# Patient Record
Sex: FEMALE | Race: WHITE | Employment: UNEMPLOYED | URBAN - METROPOLITAN AREA
[De-identification: names, ages, dates, MRNs, and addresses within clinical notes are randomized per-mention and may not be internally consistent; named-entity substitution may affect disease eponyms.]

---

## 2017-01-25 ENCOUNTER — TELEPHONE (OUTPATIENT)
Dept: FAMILY MEDICINE CLINIC | Facility: CLINIC | Age: 27
End: 2017-01-25

## 2017-02-07 ENCOUNTER — INITIAL PRENATAL (OUTPATIENT)
Dept: OBGYN CLINIC | Age: 27
End: 2017-02-07

## 2017-02-07 VITALS
HEART RATE: 67 BPM | WEIGHT: 216.8 LBS | SYSTOLIC BLOOD PRESSURE: 132 MMHG | BODY MASS INDEX: 40.96 KG/M2 | DIASTOLIC BLOOD PRESSURE: 87 MMHG

## 2017-02-07 DIAGNOSIS — Z86.32 HISTORY OF GESTATIONAL DIABETES MELLITUS (GDM) IN PRIOR PREGNANCY, CURRENTLY PREGNANT IN SECOND TRIMESTER: ICD-10-CM

## 2017-02-07 DIAGNOSIS — O99.210 OBESITY IN PREGNANCY: ICD-10-CM

## 2017-02-07 DIAGNOSIS — O09.292 HISTORY OF GESTATIONAL DIABETES MELLITUS (GDM) IN PRIOR PREGNANCY, CURRENTLY PREGNANT IN SECOND TRIMESTER: ICD-10-CM

## 2017-02-07 DIAGNOSIS — Z34.82 PRENATAL CARE, SUBSEQUENT PREGNANCY, SECOND TRIMESTER: Primary | ICD-10-CM

## 2017-02-07 PROCEDURE — 99215 OFFICE O/P EST HI 40 MIN: CPT | Performed by: OBSTETRICS & GYNECOLOGY

## 2017-02-07 PROCEDURE — 90686 IIV4 VACC NO PRSV 0.5 ML IM: CPT | Performed by: OBSTETRICS & GYNECOLOGY

## 2017-02-07 PROCEDURE — 90471 IMMUNIZATION ADMIN: CPT | Performed by: OBSTETRICS & GYNECOLOGY

## 2017-02-08 PROBLEM — O26.892 RH NEGATIVE STATUS DURING PREGNANCY IN SECOND TRIMESTER, ANTEPARTUM: Status: RESOLVED | Noted: 2017-02-08 | Resolved: 2017-02-08

## 2017-02-08 PROBLEM — O26.892 RH NEGATIVE STATUS DURING PREGNANCY IN SECOND TRIMESTER, ANTEPARTUM: Status: ACTIVE | Noted: 2017-02-08

## 2017-02-08 PROBLEM — Z67.91 RH NEGATIVE STATUS DURING PREGNANCY IN SECOND TRIMESTER, ANTEPARTUM: Status: ACTIVE | Noted: 2017-02-08

## 2017-02-08 PROBLEM — Z67.91 RH NEGATIVE STATUS DURING PREGNANCY IN SECOND TRIMESTER, ANTEPARTUM: Status: RESOLVED | Noted: 2017-02-08 | Resolved: 2017-02-08

## 2017-02-08 LAB
CHLAMYDIA TRACHOMATIS AMPLIFIED DET: NORMAL
N GONORRHOEAE AMPLIFIED DET: NORMAL

## 2017-02-13 ENCOUNTER — TELEPHONE (OUTPATIENT)
Dept: OBGYN CLINIC | Age: 27
End: 2017-02-13

## 2017-03-01 ENCOUNTER — HOSPITAL ENCOUNTER (OUTPATIENT)
Dept: OTHER | Age: 27
Discharge: HOME | End: 2017-03-31

## 2017-03-01 ENCOUNTER — ROUTINE PRENATAL (OUTPATIENT)
Dept: PERINATAL CARE | Age: 27
End: 2017-03-01

## 2017-03-01 VITALS
WEIGHT: 218.8 LBS | SYSTOLIC BLOOD PRESSURE: 112 MMHG | HEART RATE: 70 BPM | BODY MASS INDEX: 41.34 KG/M2 | DIASTOLIC BLOOD PRESSURE: 78 MMHG

## 2017-03-01 DIAGNOSIS — Z36.3 SCREENING, ANTENATAL, FOR MALFORMATION BY ULTRASOUND: ICD-10-CM

## 2017-03-01 PROCEDURE — 99999 PR OFFICE/OUTPT VISIT,PROCEDURE ONLY: CPT | Performed by: OBSTETRICS & GYNECOLOGY

## 2017-03-07 ENCOUNTER — ROUTINE PRENATAL (OUTPATIENT)
Dept: OBGYN CLINIC | Age: 27
End: 2017-03-07

## 2017-03-07 VITALS
DIASTOLIC BLOOD PRESSURE: 84 MMHG | BODY MASS INDEX: 41.57 KG/M2 | HEART RATE: 86 BPM | WEIGHT: 220 LBS | SYSTOLIC BLOOD PRESSURE: 123 MMHG

## 2017-03-07 DIAGNOSIS — Z34.82 PRENATAL CARE, SUBSEQUENT PREGNANCY, SECOND TRIMESTER: Primary | ICD-10-CM

## 2017-03-07 PROCEDURE — 99213 OFFICE O/P EST LOW 20 MIN: CPT | Performed by: OBSTETRICS & GYNECOLOGY

## 2017-03-23 ENCOUNTER — HOSPITAL ENCOUNTER (OUTPATIENT)
Dept: ULTRASOUND IMAGING | Age: 27
Discharge: HOME | End: 2017-03-23
Attending: OBSTETRICS & GYNECOLOGY | Admitting: OBSTETRICS & GYNECOLOGY

## 2017-03-23 DIAGNOSIS — Z34.82 PRENATAL CARE, SUBSEQUENT PREGNANCY, SECOND TRIMESTER: ICD-10-CM

## 2017-03-23 DIAGNOSIS — Z34.82 ENCOUNTER FOR SUPERVISION OF OTHER NORMAL PREGNANCY, SECOND TRIMESTER: ICD-10-CM

## 2017-04-01 ENCOUNTER — HOSPITAL ENCOUNTER (OUTPATIENT)
Dept: OTHER | Age: 27
Discharge: HOME | End: 2017-04-30

## 2017-04-04 ENCOUNTER — ROUTINE PRENATAL (OUTPATIENT)
Dept: OBGYN CLINIC | Age: 27
End: 2017-04-04

## 2017-04-04 VITALS
HEART RATE: 96 BPM | DIASTOLIC BLOOD PRESSURE: 70 MMHG | WEIGHT: 222 LBS | BODY MASS INDEX: 41.95 KG/M2 | SYSTOLIC BLOOD PRESSURE: 114 MMHG

## 2017-04-04 DIAGNOSIS — Z34.82 PRENATAL CARE, SUBSEQUENT PREGNANCY, SECOND TRIMESTER: Primary | ICD-10-CM

## 2017-04-04 PROCEDURE — 99212 OFFICE O/P EST SF 10 MIN: CPT

## 2017-05-02 ENCOUNTER — ROUTINE PRENATAL (OUTPATIENT)
Dept: OBGYN CLINIC | Age: 27
End: 2017-05-02

## 2017-05-02 VITALS
WEIGHT: 225 LBS | SYSTOLIC BLOOD PRESSURE: 125 MMHG | DIASTOLIC BLOOD PRESSURE: 75 MMHG | HEART RATE: 93 BPM | BODY MASS INDEX: 42.51 KG/M2

## 2017-05-02 DIAGNOSIS — Z34.82 PRENATAL CARE, SUBSEQUENT PREGNANCY, SECOND TRIMESTER: Primary | ICD-10-CM

## 2017-05-02 PROCEDURE — 99212 OFFICE O/P EST SF 10 MIN: CPT | Performed by: OBSTETRICS & GYNECOLOGY

## 2017-05-30 ENCOUNTER — ROUTINE PRENATAL (OUTPATIENT)
Dept: OBGYN CLINIC | Age: 27
End: 2017-05-30

## 2017-05-30 VITALS
SYSTOLIC BLOOD PRESSURE: 148 MMHG | WEIGHT: 225 LBS | DIASTOLIC BLOOD PRESSURE: 78 MMHG | BODY MASS INDEX: 42.51 KG/M2 | HEART RATE: 96 BPM

## 2017-05-30 DIAGNOSIS — Z34.82 PRENATAL CARE, SUBSEQUENT PREGNANCY, SECOND TRIMESTER: Primary | ICD-10-CM

## 2017-05-30 PROCEDURE — 99212 OFFICE O/P EST SF 10 MIN: CPT

## 2017-06-13 ENCOUNTER — ROUTINE PRENATAL (OUTPATIENT)
Dept: OBGYN CLINIC | Age: 27
End: 2017-06-13

## 2017-06-13 VITALS
HEART RATE: 86 BPM | WEIGHT: 224 LBS | SYSTOLIC BLOOD PRESSURE: 137 MMHG | BODY MASS INDEX: 42.32 KG/M2 | DIASTOLIC BLOOD PRESSURE: 68 MMHG

## 2017-06-13 DIAGNOSIS — Z34.83 PRENATAL CARE, SUBSEQUENT PREGNANCY, THIRD TRIMESTER: Primary | ICD-10-CM

## 2017-06-13 DIAGNOSIS — O09.A0 H/O MOLAR PREGNANCY, ANTEPARTUM: ICD-10-CM

## 2017-06-13 PROCEDURE — 99212 OFFICE O/P EST SF 10 MIN: CPT | Performed by: OBSTETRICS & GYNECOLOGY

## 2017-06-27 ENCOUNTER — ROUTINE PRENATAL (OUTPATIENT)
Dept: OBGYN CLINIC | Age: 27
End: 2017-06-27

## 2017-06-27 VITALS
SYSTOLIC BLOOD PRESSURE: 147 MMHG | WEIGHT: 226 LBS | DIASTOLIC BLOOD PRESSURE: 89 MMHG | BODY MASS INDEX: 42.7 KG/M2 | HEART RATE: 60 BPM

## 2017-06-27 DIAGNOSIS — Z34.83 PRENATAL CARE, SUBSEQUENT PREGNANCY, THIRD TRIMESTER: Primary | ICD-10-CM

## 2017-06-27 PROCEDURE — 99212 OFFICE O/P EST SF 10 MIN: CPT | Performed by: OBSTETRICS & GYNECOLOGY

## 2017-06-28 PROBLEM — O36.4XX0 FETAL DEMISE, GREATER THAN 22 WEEKS, ANTEPARTUM, SINGLE GESTATION: Status: ACTIVE | Noted: 2017-06-28

## 2018-02-15 ENCOUNTER — INITIAL PRENATAL (OUTPATIENT)
Dept: OBGYN CLINIC | Age: 28
End: 2018-02-15

## 2018-02-15 ENCOUNTER — HOSPITAL ENCOUNTER (OUTPATIENT)
Dept: ULTRASOUND IMAGING | Age: 28
Discharge: HOME | End: 2018-02-28

## 2018-02-15 ENCOUNTER — HOSPITAL ENCOUNTER (OUTPATIENT)
Dept: OTHER | Age: 28
Discharge: HOME | End: 2018-02-15
Attending: OBSTETRICS & GYNECOLOGY | Admitting: OBSTETRICS & GYNECOLOGY

## 2018-02-15 VITALS
HEART RATE: 80 BPM | WEIGHT: 227 LBS | BODY MASS INDEX: 37.77 KG/M2 | DIASTOLIC BLOOD PRESSURE: 69 MMHG | SYSTOLIC BLOOD PRESSURE: 127 MMHG

## 2018-02-15 DIAGNOSIS — O36.5920 MATERNAL CARE FOR OTHER KNOWN OR SUSPECTED POOR FETAL GROWTH, SECOND TRIMESTER, NOT APPLICABLE OR UNSPECIFIED: ICD-10-CM

## 2018-02-15 DIAGNOSIS — O36.5920 POOR FETAL GROWTH AFFECTING MANAGEMENT OF MOTHER IN SECOND TRIMESTER, SINGLE OR UNSPECIFIED FETUS: ICD-10-CM

## 2018-02-15 DIAGNOSIS — Z34.82 PRENATAL CARE, SUBSEQUENT PREGNANCY, SECOND TRIMESTER: Primary | ICD-10-CM

## 2018-02-15 DIAGNOSIS — Z01.89 ENCOUNTER FOR IMAGING TO ASSESS MYOCARDIAL VIABILITY: ICD-10-CM

## 2018-02-15 LAB
ABO/RH: NORMAL
ANTIBODY SCREEN: NORMAL
HEPATITIS C ANTIBODY INTERPRETATION: NORMAL

## 2018-02-15 PROCEDURE — 99214 OFFICE O/P EST MOD 30 MIN: CPT | Performed by: OBSTETRICS & GYNECOLOGY

## 2018-02-15 RX ORDER — VITAMIN A, VITAMIN C, VITAMIN D-3, VITAMIN E, VITAMIN B-1, VITAMIN B-2, NIACIN, VITAMIN B-6, CALCIUM, IRON, ZINC, COPPER 4000; 120; 400; 22; 1.84; 3; 20; 10; 1; 12; 200; 27; 25; 2 [IU]/1; MG/1; [IU]/1; MG/1; MG/1; MG/1; MG/1; MG/1; MG/1; UG/1; MG/1; MG/1; MG/1; MG/1
1 TABLET ORAL DAILY
Qty: 30 TABLET | Refills: 11 | Status: SHIPPED | OUTPATIENT
Start: 2018-02-15 | End: 2018-02-15 | Stop reason: CLARIF

## 2018-02-15 RX ORDER — VITAMIN A, VITAMIN C, VITAMIN D-3, VITAMIN E, VITAMIN B-1, VITAMIN B-2, NIACIN, VITAMIN B-6, CALCIUM, IRON, ZINC, COPPER 4000; 120; 400; 22; 1.84; 3; 20; 10; 1; 12; 200; 27; 25; 2 [IU]/1; MG/1; [IU]/1; MG/1; MG/1; MG/1; MG/1; MG/1; MG/1; UG/1; MG/1; MG/1; MG/1; MG/1
1 TABLET ORAL DAILY
Qty: 30 TABLET | Refills: 11 | Status: SHIPPED | OUTPATIENT
Start: 2018-02-15 | End: 2018-02-21 | Stop reason: ALTCHOICE

## 2018-02-15 NOTE — PROGRESS NOTES
[facility] Obstetric Social History     Patient Name [facility] [birth year] Prenatal Care Began  2-15-18     Phone [phone] (home)  Kalli Ivan     Emergency Contact: Chinedu Luna    Phone [phone]     Marital Status: Single x                 Living Situation: lives with her mom, brother, boyfriend and two children     How many children do you have? 2     Name  Mark Green Age 8  Name  Young Wyocena Age 5  Name   Age    Name   Age    Name   Age       In whose custody? patients     Children Protective Services Involvement: Current   Prior   Dates    Are you on Probation? denies     Attitude about pregnancy: concerned because of stillborn with last pregnancy     Preparation for Infant arrival:  Patient plans to purchase items     Childbirth Classes:     Parenting Classes:       Any Domestic Abuse: denies  Physical Abuse: denies  Sexual Abuse: denies  Substance Abuse: denies  History of Depression: depression during time when she lost her son (stillbirth)  Other Mental Health Issues: denies     Education: 11th grade Occupation: unemployed  Was supported by her boyfriend and plans to return to cash assistance      [facility] [place] needs info Medicaid x [employer] x [employer] will apply  Support System: mom, boyfriend     Father of Baby: Jennifer Morgan  Age: 29 Education: 11th  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 2  Attitude toward Pregnancy? excited  Relationship with Father of Baby:  Been together in a relationship almost almost 5 years     Patient concerns/plans for self and infant: concern for healthy baby related to still birth, plan to work with infant     Summary: Patient lives with her lives with her mom, brother, boyfriend and two children. Patient denies abuse or depression. Patient states she receives support from her boyfriend and mom. Patient scored a 9 on depression scale and is not showing signs of depression.   Psychological Screening patient around second hand smoke from boyfriend advised patient to stay away from it.      Referrals Offered:  [address], [facility] [facility]  Follow-up needed:       Benji HERNANDEZW, LSW       Follow-up:

## 2018-02-15 NOTE — PROGRESS NOTES
New OB visit. No complaints. PE done. VS reviewed. Plan early glucola. 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 Plan start AP testing 32 wks.  Sono for absent [employer]

## 2018-02-16 LAB
BASOPHILS ABSOLUTE: 0 K/UL (ref 0–0.2)
BASOPHILS RELATIVE PERCENT: 0.5 %
EOSINOPHILS ABSOLUTE: 0.2 K/UL (ref 0–0.6)
EOSINOPHILS RELATIVE PERCENT: 1.6 %
HCT VFR BLD CALC: 37.6 % (ref 36–48)
HEMOGLOBIN: 13 G/DL (ref 12–16)
HEPATITIS B SURFACE ANTIGEN INTERPRETATION: NORMAL
HIV AG/AB: NORMAL
HIV ANTIGEN: NORMAL
HIV-1 ANTIBODY: NORMAL
HIV-2 AB: NORMAL
LYMPHOCYTES ABSOLUTE: 2.3 K/UL (ref 1–5.1)
LYMPHOCYTES RELATIVE PERCENT: 23.6 %
MCH RBC QN AUTO: 30.7 PG (ref 26–34)
MCHC RBC AUTO-ENTMCNC: 34.6 G/DL (ref 31–36)
MCV RBC AUTO: 88.6 FL (ref 80–100)
MONOCYTES ABSOLUTE: 1 K/UL (ref 0–1.3)
MONOCYTES RELATIVE PERCENT: 9.7 %
NEUTROPHILS ABSOLUTE: 6.4 K/UL (ref 1.7–7.7)
NEUTROPHILS RELATIVE PERCENT: 64.6 %
PDW BLD-RTO: 13.3 % (ref 12.4–15.4)
PLATELET # BLD: 270 K/UL (ref 135–450)
PMV BLD AUTO: 9 FL (ref 5–10.5)
RBC # BLD: 4.24 M/UL (ref 4–5.2)
RPR: NORMAL
RUBELLA ANTIBODY IGG: 111.4 IU/ML
WBC # BLD: 9.8 K/UL (ref 4–11)

## 2018-02-20 LAB
C. TRACHOMATIS DNA,THIN PREP: NEGATIVE
N. GONORRHOEAE DNA, THIN PREP: NEGATIVE

## 2018-02-23 LAB
HPV COMMENT: NORMAL
HPV TYPE 16: NOT DETECTED
HPV TYPE 18: NOT DETECTED
HPVOH (OTHER TYPES): NOT DETECTED

## 2018-02-27 ENCOUNTER — ROUTINE PRENATAL (OUTPATIENT)
Dept: OBGYN CLINIC | Age: 28
End: 2018-02-27

## 2018-02-27 VITALS
SYSTOLIC BLOOD PRESSURE: 139 MMHG | WEIGHT: 225 LBS | HEART RATE: 55 BPM | BODY MASS INDEX: 37.44 KG/M2 | DIASTOLIC BLOOD PRESSURE: 75 MMHG

## 2018-02-27 DIAGNOSIS — O36.4XX0 FETAL DEMISE, GREATER THAN 22 WEEKS, ANTEPARTUM, SINGLE GESTATION: ICD-10-CM

## 2018-02-27 DIAGNOSIS — Z34.91 PRENATAL CARE IN FIRST TRIMESTER: Primary | ICD-10-CM

## 2018-02-27 PROCEDURE — 99214 OFFICE O/P EST MOD 30 MIN: CPT | Performed by: OBSTETRICS & GYNECOLOGY

## 2018-02-27 NOTE — PROGRESS NOTES
Mild elevation in BP, will return in 4 weeks to recheck BP. No hx of hypertension, no hypertension with IUFD.   Early GCT next visit

## 2018-03-01 ENCOUNTER — HOSPITAL ENCOUNTER (OUTPATIENT)
Dept: OTHER | Age: 28
Discharge: HOME | End: 2018-03-31
Attending: OBSTETRICS & GYNECOLOGY | Admitting: OBSTETRICS & GYNECOLOGY

## 2018-04-10 ENCOUNTER — TELEPHONE (OUTPATIENT)
Dept: OBGYN CLINIC | Age: 28
End: 2018-04-10

## 2018-05-15 ENCOUNTER — HOSPITAL ENCOUNTER (OUTPATIENT)
Dept: OTHER | Age: 28
Discharge: HOME | End: 2018-05-31

## 2018-05-16 ENCOUNTER — ROUTINE PRENATAL (OUTPATIENT)
Dept: PERINATAL CARE | Age: 28
End: 2018-05-16

## 2018-05-16 VITALS
WEIGHT: 234.2 LBS | HEART RATE: 68 BPM | BODY MASS INDEX: 44.22 KG/M2 | HEIGHT: 61 IN | DIASTOLIC BLOOD PRESSURE: 76 MMHG | SYSTOLIC BLOOD PRESSURE: 132 MMHG

## 2018-05-16 DIAGNOSIS — Z34.82 PRENATAL CARE, SUBSEQUENT PREGNANCY, SECOND TRIMESTER: Primary | ICD-10-CM

## 2018-05-16 PROCEDURE — 99999 PR OFFICE/OUTPT VISIT,PROCEDURE ONLY: CPT | Performed by: OBSTETRICS & GYNECOLOGY

## 2018-06-01 ENCOUNTER — HOSPITAL ENCOUNTER (OUTPATIENT)
Dept: OTHER | Age: 28
Discharge: HOME | End: 2018-06-30

## 2018-07-11 ENCOUNTER — HOSPITAL ENCOUNTER (OUTPATIENT)
Dept: OTHER | Age: 28
Discharge: HOME | End: 2018-07-31

## 2018-07-12 ENCOUNTER — ROUTINE PRENATAL (OUTPATIENT)
Dept: PERINATAL CARE | Age: 28
End: 2018-07-12

## 2018-07-12 VITALS
HEART RATE: 46 BPM | DIASTOLIC BLOOD PRESSURE: 78 MMHG | BODY MASS INDEX: 43.84 KG/M2 | SYSTOLIC BLOOD PRESSURE: 126 MMHG | WEIGHT: 232 LBS

## 2018-07-12 DIAGNOSIS — Z34.82 PRENATAL CARE, SUBSEQUENT PREGNANCY, SECOND TRIMESTER: Primary | ICD-10-CM

## 2018-07-12 PROCEDURE — 99999 PR OFFICE/OUTPT VISIT,PROCEDURE ONLY: CPT | Performed by: OBSTETRICS & GYNECOLOGY

## 2018-07-12 NOTE — PROGRESS NOTES
Here for LevelII USD for f/u growth and anatomy. Reports + fetal movement, denies bleeding, leaking of fluid or pain.

## 2018-07-19 ENCOUNTER — TELEPHONE (OUTPATIENT)
Dept: PERINATAL CARE | Age: 28
End: 2018-07-19

## 2018-07-19 NOTE — TELEPHONE ENCOUNTER
PT. Did not show up for DAX appt. Called pt. And resched DAX, growth US 7-26-18. Stressed importance of keeping appts. Informed John NOLASCO at [address] [place] [address] that pt. Missed appt. Today.

## 2018-07-26 ENCOUNTER — TELEPHONE (OUTPATIENT)
Dept: PERINATAL CARE | Age: 28
End: 2018-07-26

## 2018-08-01 ENCOUNTER — HOSPITAL ENCOUNTER (OUTPATIENT)
Dept: OTHER | Age: 28
Discharge: HOME | End: 2018-08-31

## 2018-08-02 ENCOUNTER — ROUTINE PRENATAL (OUTPATIENT)
Dept: PERINATAL CARE | Age: 28
End: 2018-08-02

## 2018-08-02 VITALS
HEART RATE: 80 BPM | DIASTOLIC BLOOD PRESSURE: 71 MMHG | WEIGHT: 229 LBS | SYSTOLIC BLOOD PRESSURE: 115 MMHG | BODY MASS INDEX: 43.27 KG/M2

## 2018-08-02 DIAGNOSIS — Z34.83 PRENATAL CARE, SUBSEQUENT PREGNANCY, THIRD TRIMESTER: Primary | ICD-10-CM

## 2018-08-02 PROCEDURE — 99999 PR OFFICE/OUTPT VISIT,PROCEDURE ONLY: CPT | Performed by: OBSTETRICS & GYNECOLOGY

## 2018-08-09 ENCOUNTER — ROUTINE PRENATAL (OUTPATIENT)
Dept: PERINATAL CARE | Age: 28
End: 2018-08-09

## 2018-08-09 VITALS
WEIGHT: 234.2 LBS | BODY MASS INDEX: 44.25 KG/M2 | SYSTOLIC BLOOD PRESSURE: 114 MMHG | DIASTOLIC BLOOD PRESSURE: 73 MMHG | HEART RATE: 56 BPM

## 2018-08-09 DIAGNOSIS — Z34.83 PRENATAL CARE, SUBSEQUENT PREGNANCY, THIRD TRIMESTER: Primary | ICD-10-CM

## 2018-08-09 PROCEDURE — 99999 PR OFFICE/OUTPT VISIT,PROCEDURE ONLY: CPT | Performed by: OBSTETRICS & GYNECOLOGY

## 2018-08-09 NOTE — PROGRESS NOTES
Here for 4 wk f/u growth and bilateral renal pyelectasis USD, DAX, then NST at triage after visit. Reports + fetal movement, denies bleeding, leaking of fluid or pain.

## 2018-08-15 ENCOUNTER — ROUTINE PRENATAL (OUTPATIENT)
Dept: PERINATAL CARE | Age: 28
End: 2018-08-15

## 2018-08-15 VITALS
WEIGHT: 232.2 LBS | HEART RATE: 55 BPM | SYSTOLIC BLOOD PRESSURE: 136 MMHG | DIASTOLIC BLOOD PRESSURE: 79 MMHG | BODY MASS INDEX: 43.87 KG/M2

## 2018-08-15 DIAGNOSIS — Z34.83 PRENATAL CARE, SUBSEQUENT PREGNANCY, THIRD TRIMESTER: Primary | ICD-10-CM

## 2018-08-15 PROCEDURE — 99999 PR OFFICE/OUTPT VISIT,PROCEDURE ONLY: CPT | Performed by: OBSTETRICS & GYNECOLOGY

## 2018-08-15 NOTE — PROGRESS NOTES
Here for weekly DAX and NST at [facility]. Reports + fetal movement, denies bleeding, leaking of fluid or pain.

## 2018-08-15 NOTE — PROGRESS NOTES
Maternal-Fetal Medicine    Please see the full documentation embedded in the ultrasound report (in Media Tab)  for complete detail.      Short synopsis of the findings & recommendations:      testing reassuring    Electronically signed by Lindsay Mckinney MD on 8/15/2018 at 3:28 PM

## 2018-08-23 ENCOUNTER — ROUTINE PRENATAL (OUTPATIENT)
Dept: PERINATAL CARE | Age: 28
End: 2018-08-23

## 2018-08-23 VITALS — WEIGHT: 231 LBS | DIASTOLIC BLOOD PRESSURE: 82 MMHG | SYSTOLIC BLOOD PRESSURE: 121 MMHG | BODY MASS INDEX: 43.65 KG/M2

## 2018-08-23 DIAGNOSIS — Z34.83 PRENATAL CARE, SUBSEQUENT PREGNANCY, THIRD TRIMESTER: Primary | ICD-10-CM

## 2018-08-23 PROCEDURE — 99999 PR OFFICE/OUTPT VISIT,PROCEDURE ONLY: CPT | Performed by: OBSTETRICS & GYNECOLOGY

## 2018-08-30 ENCOUNTER — ROUTINE PRENATAL (OUTPATIENT)
Dept: PERINATAL CARE | Age: 28
End: 2018-08-30

## 2018-08-30 VITALS
WEIGHT: 234.6 LBS | HEART RATE: 84 BPM | BODY MASS INDEX: 44.33 KG/M2 | SYSTOLIC BLOOD PRESSURE: 111 MMHG | DIASTOLIC BLOOD PRESSURE: 81 MMHG

## 2018-08-30 DIAGNOSIS — Z34.83 PRENATAL CARE, SUBSEQUENT PREGNANCY, THIRD TRIMESTER: Primary | ICD-10-CM

## 2018-08-30 PROCEDURE — 99999 PR OFFICE/OUTPT VISIT,PROCEDURE ONLY: CPT | Performed by: OBSTETRICS & GYNECOLOGY

## 2018-09-01 ENCOUNTER — HOSPITAL ENCOUNTER (OUTPATIENT)
Dept: OTHER | Age: 28
Discharge: HOME | End: 2018-09-01

## 2018-09-04 PROBLEM — O47.02 THREATENED PREMATURE LABOR AFFECTING PREGNANCY, LESS THAN 37 WEEKS IN SECOND TRIMESTER, ANTEPARTUM: Status: ACTIVE | Noted: 2018-09-04

## 2018-09-06 ENCOUNTER — ROUTINE PRENATAL (OUTPATIENT)
Dept: PERINATAL CARE | Age: 28
End: 2018-09-06

## 2018-09-06 VITALS
SYSTOLIC BLOOD PRESSURE: 126 MMHG | DIASTOLIC BLOOD PRESSURE: 75 MMHG | WEIGHT: 232 LBS | BODY MASS INDEX: 43.84 KG/M2 | HEART RATE: 49 BPM

## 2018-09-06 DIAGNOSIS — Z34.83 PRENATAL CARE, SUBSEQUENT PREGNANCY, THIRD TRIMESTER: Primary | ICD-10-CM

## 2018-09-06 PROCEDURE — 99999 PR OFFICE/OUTPT VISIT,PROCEDURE ONLY: CPT | Performed by: OBSTETRICS & GYNECOLOGY

## 2018-12-03 ENCOUNTER — APPOINTMENT (OUTPATIENT)
Dept: CT IMAGING | Age: 28
End: 2018-12-03
Payer: MEDICAID

## 2018-12-03 ENCOUNTER — HOSPITAL ENCOUNTER (EMERGENCY)
Age: 28
Discharge: HOME | End: 2018-12-03
Attending: EMERGENCY MEDICINE
Payer: MEDICAID

## 2018-12-03 ENCOUNTER — APPOINTMENT (OUTPATIENT)
Dept: GENERAL RADIOLOGY | Age: 28
End: 2018-12-03
Payer: MEDICAID

## 2018-12-03 VITALS
RESPIRATION RATE: 16 BRPM | WEIGHT: 234 LBS | SYSTOLIC BLOOD PRESSURE: 138 MMHG | HEIGHT: 61 IN | DIASTOLIC BLOOD PRESSURE: 89 MMHG | OXYGEN SATURATION: 99 % | HEART RATE: 62 BPM | TEMPERATURE: 98.4 F | BODY MASS INDEX: 44.18 KG/M2

## 2018-12-03 DIAGNOSIS — R07.89 CHEST DISCOMFORT: Primary | ICD-10-CM

## 2018-12-03 LAB
A/G RATIO: 1.1 (ref 1.1–2.2)
ALBUMIN SERPL-MCNC: 4.1 G/DL (ref 3.4–5)
ALP BLD-CCNC: 89 U/L (ref 40–129)
ALT SERPL-CCNC: 48 U/L (ref 10–40)
ANION GAP SERPL CALCULATED.3IONS-SCNC: 9 MMOL/L (ref 3–16)
AST SERPL-CCNC: 29 U/L (ref 15–37)
BASOPHILS ABSOLUTE: 0 K/UL (ref 0–0.2)
BASOPHILS RELATIVE PERCENT: 0.4 %
BILIRUB SERPL-MCNC: 0.4 MG/DL (ref 0–1)
BUN BLDV-MCNC: 7 MG/DL (ref 7–20)
CALCIUM SERPL-MCNC: 9.2 MG/DL (ref 8.3–10.6)
CHLORIDE BLD-SCNC: 105 MMOL/L (ref 99–110)
CO2: 24 MMOL/L (ref 21–32)
CREAT SERPL-MCNC: 0.6 MG/DL (ref 0.6–1.1)
D DIMER: 407 NG/ML DDU (ref 0–229)
EOSINOPHILS ABSOLUTE: 0.2 K/UL (ref 0–0.6)
EOSINOPHILS RELATIVE PERCENT: 2.5 %
GFR AFRICAN AMERICAN: >60
GFR NON-AFRICAN AMERICAN: >60
GLOBULIN: 3.9 G/DL
GLUCOSE BLD-MCNC: 92 MG/DL (ref 70–99)
HCG QUALITATIVE: NEGATIVE
HCT VFR BLD CALC: 39.4 % (ref 36–48)
HEMOGLOBIN: 13.2 G/DL (ref 12–16)
INR BLD: 1.05 (ref 0.86–1.14)
LYMPHOCYTES ABSOLUTE: 2.5 K/UL (ref 1–5.1)
LYMPHOCYTES RELATIVE PERCENT: 31.8 %
MAGNESIUM: 2 MG/DL (ref 1.8–2.4)
MCH RBC QN AUTO: 29.7 PG (ref 26–34)
MCHC RBC AUTO-ENTMCNC: 33.5 G/DL (ref 31–36)
MCV RBC AUTO: 88.7 FL (ref 80–100)
MONOCYTES ABSOLUTE: 0.8 K/UL (ref 0–1.3)
MONOCYTES RELATIVE PERCENT: 9.8 %
NEUTROPHILS ABSOLUTE: 4.3 K/UL (ref 1.7–7.7)
NEUTROPHILS RELATIVE PERCENT: 55.5 %
PDW BLD-RTO: 14 % (ref 12.4–15.4)
PLATELET # BLD: 254 K/UL (ref 135–450)
PMV BLD AUTO: 9.6 FL (ref 5–10.5)
POTASSIUM REFLEX MAGNESIUM: 3.6 MMOL/L (ref 3.5–5.1)
PRO-BNP: 71 PG/ML (ref 0–124)
PROTHROMBIN TIME: 12 SEC (ref 9.8–13)
RBC # BLD: 4.44 M/UL (ref 4–5.2)
SODIUM BLD-SCNC: 138 MMOL/L (ref 136–145)
TOTAL PROTEIN: 8 G/DL (ref 6.4–8.2)
TROPONIN: <0.01 NG/ML
WBC # BLD: 7.7 K/UL (ref 4–11)

## 2018-12-03 PROCEDURE — 71046 X-RAY EXAM CHEST 2 VIEWS: CPT

## 2018-12-03 PROCEDURE — 83880 ASSAY OF NATRIURETIC PEPTIDE: CPT

## 2018-12-03 PROCEDURE — 83735 ASSAY OF MAGNESIUM: CPT

## 2018-12-03 PROCEDURE — 85610 PROTHROMBIN TIME: CPT

## 2018-12-03 PROCEDURE — 84703 CHORIONIC GONADOTROPIN ASSAY: CPT

## 2018-12-03 PROCEDURE — 85025 COMPLETE CBC W/AUTO DIFF WBC: CPT

## 2018-12-03 PROCEDURE — 84484 ASSAY OF TROPONIN QUANT: CPT

## 2018-12-03 PROCEDURE — 71260 CT THORAX DX C+: CPT

## 2018-12-03 PROCEDURE — 93005 ELECTROCARDIOGRAM TRACING: CPT | Performed by: PHYSICIAN ASSISTANT

## 2018-12-03 PROCEDURE — 80053 COMPREHEN METABOLIC PANEL: CPT

## 2018-12-03 PROCEDURE — 85379 FIBRIN DEGRADATION QUANT: CPT

## 2018-12-03 PROCEDURE — 99285 EMERGENCY DEPT VISIT HI MDM: CPT

## 2018-12-03 PROCEDURE — 6360000004 HC RX CONTRAST MEDICATION: Performed by: PHYSICIAN ASSISTANT

## 2018-12-03 PROCEDURE — 36415 COLL VENOUS BLD VENIPUNCTURE: CPT

## 2018-12-03 RX ADMIN — IOPAMIDOL 75 ML: 755 INJECTION, SOLUTION INTRAVENOUS at 21:30

## 2018-12-03 ASSESSMENT — ENCOUNTER SYMPTOMS
NAUSEA: 0
VOMITING: 0
DIARRHEA: 0
SHORTNESS OF BREATH: 1
COLOR CHANGE: 0
CONSTIPATION: 0
WHEEZING: 0
COUGH: 0
STRIDOR: 0
CHEST TIGHTNESS: 0
ABDOMINAL PAIN: 0

## 2018-12-04 LAB
EKG ATRIAL RATE: 56 BPM
EKG DIAGNOSIS: NORMAL
EKG P AXIS: 25 DEGREES
EKG P-R INTERVAL: 158 MS
EKG Q-T INTERVAL: 438 MS
EKG QRS DURATION: 84 MS
EKG QTC CALCULATION (BAZETT): 422 MS
EKG R AXIS: 9 DEGREES
EKG T AXIS: -2 DEGREES
EKG VENTRICULAR RATE: 56 BPM

## 2018-12-04 PROCEDURE — 93010 ELECTROCARDIOGRAM REPORT: CPT | Performed by: INTERNAL MEDICINE

## 2018-12-04 NOTE — ED NOTES
Pt resting at present. SB to SR noted on monitor with few PVCs noted - ST analysis on. Respirations even and unlabored. No vomiting noted. Call bell in reach. Family at bedside.      Yasmine Fischer RN  12/03/18 2001

## 2018-12-04 NOTE — ED PROVIDER NOTES
for shortness of breath. Negative for cough, chest tightness, wheezing and stridor. Cardiovascular: Positive for chest pain. Negative for palpitations and leg swelling. Gastrointestinal: Negative for abdominal pain, constipation, diarrhea, nausea and vomiting. Genitourinary: Negative for difficulty urinating and dysuria. Musculoskeletal: Negative for arthralgias, myalgias, neck pain and neck stiffness. Skin: Negative for color change, pallor, rash and wound. Neurological: Negative for dizziness, light-headedness and headaches. Positives and Pertinent negatives as per HPI. Except as noted abovein the ROS, all other systems were reviewed and negative.        PAST MEDICAL HISTORY     Past Medical History:   Diagnosis Date    Abnormal heart rhythm     irregular heart rate    Abnormal Pap smear of cervix 2015    LEEP    H/o Molar pregnancy     Hypertension     Hypoglycemia     Mental disorder     on meds in high school for bipolar, not currently taking medications    Uterus bicornis affecting pregnancy          SURGICAL HISTORY     Past Surgical History:   Procedure Laterality Date    APPENDECTOMY  2015    CHOLECYSTECTOMY  2016    DILATION AND CURETTAGE OF UTERUS  2011    LEEP      TONSILLECTOMY  11/29/2016         [address]       Discharge Medication List as of 12/3/2018 10:08 PM      CONTINUE these medications which have NOT CHANGED    Details   Prenatal Multivit-Min-Fe-FA (PRENATAL VITAMINS PO) Take 1 tablet by mouth dailyHistorical Med               ALLERGIES     Latex    FAMILYHISTORY       Family History   Problem Relation Age of Onset    Diabetes Mother           SOCIAL HISTORY       Social History     Social History    Marital status: Single     Spouse name: N/A    Number of children: N/A    Years of education: N/A     Social History Main Topics    Smoking status: Former Smoker     Packs/day: 0.50     Types: Cigarettes     Quit date: 10/24/2016    Smokeless tobacco: showed a ALT of 48 but otherwise unremarkable. troponin normal.  BNP 71. Coags unremarkable. D-dimer elevated at 407. magnesium normal.  Pregnancy negative. chest x-rayshowed no acute abnormality. EKG interpreted by attending. CT of the chest obtained showing no evidence of pulmonary embolism or other acute abnormality. Given history and physical examination suffered from inner ear and chest discomfort ongoing for multiple months. Negative workup here in the ED and do not believe further workup or imaging indicated. Low risk obliquely safely discharged home. Follow up with cardiology as scheduled appointment. Return ED for any worsening symptoms. Discharged home in stable condition. Low suspicion for ACS, PE, dissection, AAA, pneumonia, pneumothorax, respiratory distress, GERD, anxiety, CHF, COPD, asthma, surgical abdomen, pericarditis, myocarditis or other emergent etiology at this time. FINAL IMPRESSION      1.  Chest discomfort          DISPOSITION/PLAN   DISPOSITION Decision To Discharge 12/03/2018 10:00:27 PM      PATIENT REFERREDTO:  [facility]  [address] [facility]  [phone]  Go to   As needed, If symptoms worsen    Your Cardiologist    Go to   appointment as scheduled      DISCHARGE MEDICATIONS:  Discharge Medication List as of 12/3/2018 10:08 PM          DISCONTINUED MEDICATIONS:  Discharge Medication List as of 12/3/2018 10:08 PM                 (Please note that portions ofthis note were completed with a voice recognition program.  Efforts were made to edit the dictations but occasionally words are mis-transcribed.)    FELIPA Philip (electronically signed)          FELIPA Marinelli  12/03/18 2970

## 2019-03-23 ENCOUNTER — HOSPITAL ENCOUNTER (EMERGENCY)
Age: 29
Discharge: HOME | End: 2019-03-23
Payer: MEDICAID

## 2019-03-23 ENCOUNTER — APPOINTMENT (OUTPATIENT)
Dept: GENERAL RADIOLOGY | Age: 29
End: 2019-03-23
Payer: MEDICAID

## 2019-03-23 VITALS
SYSTOLIC BLOOD PRESSURE: 151 MMHG | OXYGEN SATURATION: 100 % | HEIGHT: 61 IN | HEART RATE: 66 BPM | DIASTOLIC BLOOD PRESSURE: 72 MMHG | BODY MASS INDEX: 39.65 KG/M2 | TEMPERATURE: 98 F | RESPIRATION RATE: 16 BRPM | WEIGHT: 210 LBS

## 2019-03-23 DIAGNOSIS — M54.50 ACUTE BILATERAL LOW BACK PAIN WITHOUT SCIATICA: Primary | ICD-10-CM

## 2019-03-23 PROCEDURE — 6370000000 HC RX 637 (ALT 250 FOR IP): Performed by: PHYSICIAN ASSISTANT

## 2019-03-23 PROCEDURE — 99283 EMERGENCY DEPT VISIT LOW MDM: CPT

## 2019-03-23 PROCEDURE — 72110 X-RAY EXAM L-2 SPINE 4/>VWS: CPT

## 2019-03-23 RX ORDER — NAPROXEN 500 MG/1
500 TABLET ORAL 2 TIMES DAILY WITH MEALS
Qty: 30 TABLET | Refills: 0 | Status: SHIPPED | OUTPATIENT
Start: 2019-03-23 | End: 2019-04-18

## 2019-03-23 RX ORDER — CYCLOBENZAPRINE HCL 10 MG
10 TABLET ORAL ONCE
Status: COMPLETED | OUTPATIENT
Start: 2019-03-23 | End: 2019-03-23

## 2019-03-23 RX ORDER — NAPROXEN 250 MG/1
500 TABLET ORAL ONCE
Status: COMPLETED | OUTPATIENT
Start: 2019-03-23 | End: 2019-03-23

## 2019-03-23 RX ORDER — LIDOCAINE 4 G/G
1 PATCH TOPICAL DAILY
Status: DISCONTINUED | OUTPATIENT
Start: 2019-03-23 | End: 2019-03-23 | Stop reason: HOSPADM

## 2019-03-23 RX ORDER — HYDROCODONE BITARTRATE AND ACETAMINOPHEN 5; 325 MG/1; MG/1
1 TABLET ORAL ONCE
Status: COMPLETED | OUTPATIENT
Start: 2019-03-23 | End: 2019-03-23

## 2019-03-23 RX ORDER — TRAMADOL HYDROCHLORIDE 50 MG/1
50 TABLET ORAL EVERY 4 HOURS PRN
Qty: 18 TABLET | Refills: 0 | Status: SHIPPED | OUTPATIENT
Start: 2019-03-23 | End: 2019-03-26

## 2019-03-23 RX ORDER — LIDOCAINE 50 MG/G
1 PATCH TOPICAL DAILY
Qty: 30 PATCH | Refills: 0 | Status: SHIPPED | OUTPATIENT
Start: 2019-03-23 | End: 2019-04-18

## 2019-03-23 RX ORDER — CYCLOBENZAPRINE HCL 10 MG
10 TABLET ORAL 3 TIMES DAILY PRN
Qty: 30 TABLET | Refills: 0 | Status: SHIPPED | OUTPATIENT
Start: 2019-03-23 | End: 2019-04-02

## 2019-03-23 RX ADMIN — HYDROCODONE BITARTRATE AND ACETAMINOPHEN 1 TABLET: 5; 325 TABLET ORAL at 14:00

## 2019-03-23 RX ADMIN — NAPROXEN 500 MG: 250 TABLET ORAL at 14:00

## 2019-03-23 RX ADMIN — CYCLOBENZAPRINE HYDROCHLORIDE 10 MG: 10 TABLET, FILM COATED ORAL at 14:00

## 2019-03-23 ASSESSMENT — ENCOUNTER SYMPTOMS
COUGH: 0
RESPIRATORY NEGATIVE: 1
VOMITING: 0
COLOR CHANGE: 0
CONSTIPATION: 0
SHORTNESS OF BREATH: 0
BACK PAIN: 1
DIARRHEA: 0
ABDOMINAL PAIN: 0
NAUSEA: 0

## 2019-03-23 ASSESSMENT — PAIN DESCRIPTION - ORIENTATION: ORIENTATION: LOWER

## 2019-03-23 ASSESSMENT — PAIN SCALES - GENERAL
PAINLEVEL_OUTOF10: 10
PAINLEVEL_OUTOF10: 10

## 2019-03-23 ASSESSMENT — PAIN DESCRIPTION - PAIN TYPE: TYPE: ACUTE PAIN

## 2019-03-23 ASSESSMENT — PAIN DESCRIPTION - LOCATION: LOCATION: BACK

## 2019-04-18 ENCOUNTER — HOSPITAL ENCOUNTER (EMERGENCY)
Age: 29
Discharge: LEFT BEFORE DISCHARGE | End: 2019-04-18
Payer: MEDICAID

## 2019-04-18 ENCOUNTER — APPOINTMENT (OUTPATIENT)
Dept: GENERAL RADIOLOGY | Age: 29
End: 2019-04-18
Payer: MEDICAID

## 2019-04-18 VITALS
DIASTOLIC BLOOD PRESSURE: 88 MMHG | SYSTOLIC BLOOD PRESSURE: 131 MMHG | TEMPERATURE: 98.7 F | OXYGEN SATURATION: 97 % | HEIGHT: 61 IN | WEIGHT: 233 LBS | RESPIRATION RATE: 18 BRPM | HEART RATE: 105 BPM | BODY MASS INDEX: 43.99 KG/M2

## 2019-04-18 PROCEDURE — 4500000002 HC ER NO CHARGE

## 2019-04-18 PROCEDURE — 93005 ELECTROCARDIOGRAM TRACING: CPT | Performed by: EMERGENCY MEDICINE

## 2019-04-18 PROCEDURE — 71046 X-RAY EXAM CHEST 2 VIEWS: CPT

## 2019-04-18 PROCEDURE — 71045 X-RAY EXAM CHEST 1 VIEW: CPT

## 2019-04-18 ASSESSMENT — PAIN SCALES - GENERAL: PAINLEVEL_OUTOF10: 3

## 2019-04-19 ENCOUNTER — HOSPITAL ENCOUNTER (EMERGENCY)
Age: 29
Discharge: HOME | End: 2019-04-19
Attending: EMERGENCY MEDICINE
Payer: MEDICAID

## 2019-04-19 VITALS
OXYGEN SATURATION: 99 % | BODY MASS INDEX: 44.02 KG/M2 | DIASTOLIC BLOOD PRESSURE: 80 MMHG | HEART RATE: 68 BPM | SYSTOLIC BLOOD PRESSURE: 135 MMHG | TEMPERATURE: 98.1 F | RESPIRATION RATE: 18 BRPM | WEIGHT: 233 LBS

## 2019-04-19 DIAGNOSIS — R07.9 CHEST PAIN, UNSPECIFIED TYPE: Primary | ICD-10-CM

## 2019-04-19 LAB
A/G RATIO: 1.1 (ref 1.1–2.2)
ALBUMIN SERPL-MCNC: 4.1 G/DL (ref 3.4–5)
ALP BLD-CCNC: 83 U/L (ref 40–129)
ALT SERPL-CCNC: 38 U/L (ref 10–40)
ANION GAP SERPL CALCULATED.3IONS-SCNC: 9 MMOL/L (ref 3–16)
AST SERPL-CCNC: 26 U/L (ref 15–37)
BASOPHILS ABSOLUTE: 0 K/UL (ref 0–0.2)
BASOPHILS RELATIVE PERCENT: 0 %
BILIRUB SERPL-MCNC: 0.5 MG/DL (ref 0–1)
BUN BLDV-MCNC: 9 MG/DL (ref 7–20)
CALCIUM SERPL-MCNC: 8.9 MG/DL (ref 8.3–10.6)
CHLORIDE BLD-SCNC: 107 MMOL/L (ref 99–110)
CO2: 21 MMOL/L (ref 21–32)
CREAT SERPL-MCNC: 0.5 MG/DL (ref 0.6–1.1)
EKG ATRIAL RATE: 79 BPM
EKG ATRIAL RATE: 94 BPM
EKG DIAGNOSIS: NORMAL
EKG DIAGNOSIS: NORMAL
EKG P AXIS: 42 DEGREES
EKG P AXIS: 52 DEGREES
EKG P-R INTERVAL: 154 MS
EKG P-R INTERVAL: 162 MS
EKG Q-T INTERVAL: 358 MS
EKG Q-T INTERVAL: 402 MS
EKG QRS DURATION: 78 MS
EKG QRS DURATION: 78 MS
EKG QTC CALCULATION (BAZETT): 447 MS
EKG QTC CALCULATION (BAZETT): 460 MS
EKG R AXIS: 12 DEGREES
EKG R AXIS: 22 DEGREES
EKG T AXIS: 11 DEGREES
EKG T AXIS: 21 DEGREES
EKG VENTRICULAR RATE: 79 BPM
EKG VENTRICULAR RATE: 94 BPM
EOSINOPHILS ABSOLUTE: 0.2 K/UL (ref 0–0.6)
EOSINOPHILS RELATIVE PERCENT: 3 %
GFR AFRICAN AMERICAN: >60
GFR NON-AFRICAN AMERICAN: >60
GLOBULIN: 3.9 G/DL
GLUCOSE BLD-MCNC: 103 MG/DL (ref 70–99)
HCT VFR BLD CALC: 41.3 % (ref 36–48)
HEMOGLOBIN: 13.8 G/DL (ref 12–16)
LYMPHOCYTES ABSOLUTE: 1.6 K/UL (ref 1–5.1)
LYMPHOCYTES RELATIVE PERCENT: 28 %
MCH RBC QN AUTO: 29.5 PG (ref 26–34)
MCHC RBC AUTO-ENTMCNC: 33.4 G/DL (ref 31–36)
MCV RBC AUTO: 88.3 FL (ref 80–100)
METAMYELOCYTES RELATIVE PERCENT: 1 %
MONOCYTES ABSOLUTE: 0.8 K/UL (ref 0–1.3)
MONOCYTES RELATIVE PERCENT: 14 %
NEUTROPHILS ABSOLUTE: 3.2 K/UL (ref 1.7–7.7)
NEUTROPHILS RELATIVE PERCENT: 54 %
PDW BLD-RTO: 13.5 % (ref 12.4–15.4)
PLATELET # BLD: 243 K/UL (ref 135–450)
PLATELET SLIDE REVIEW: ADEQUATE
PMV BLD AUTO: 9.1 FL (ref 5–10.5)
POTASSIUM SERPL-SCNC: 4.1 MMOL/L (ref 3.5–5.1)
RBC # BLD: 4.68 M/UL (ref 4–5.2)
RBC # BLD: NORMAL 10*6/UL
SLIDE REVIEW: ABNORMAL
SODIUM BLD-SCNC: 137 MMOL/L (ref 136–145)
TOTAL PROTEIN: 8 G/DL (ref 6.4–8.2)
TROPONIN: <0.01 NG/ML
WBC # BLD: 5.8 K/UL (ref 4–11)

## 2019-04-19 PROCEDURE — 93005 ELECTROCARDIOGRAM TRACING: CPT | Performed by: EMERGENCY MEDICINE

## 2019-04-19 PROCEDURE — 80053 COMPREHEN METABOLIC PANEL: CPT

## 2019-04-19 PROCEDURE — 93010 ELECTROCARDIOGRAM REPORT: CPT | Performed by: INTERNAL MEDICINE

## 2019-04-19 PROCEDURE — 84484 ASSAY OF TROPONIN QUANT: CPT

## 2019-04-19 PROCEDURE — 85025 COMPLETE CBC W/AUTO DIFF WBC: CPT

## 2019-04-19 PROCEDURE — 99285 EMERGENCY DEPT VISIT HI MDM: CPT

## 2019-04-19 RX ORDER — FAMOTIDINE 20 MG/1
20 TABLET, FILM COATED ORAL 2 TIMES DAILY
Qty: 28 TABLET | Refills: 0 | Status: SHIPPED | OUTPATIENT
Start: 2019-04-19 | End: 2021-08-04

## 2019-04-19 ASSESSMENT — PAIN SCALES - GENERAL: PAINLEVEL_OUTOF10: 5

## 2019-04-19 ASSESSMENT — PAIN DESCRIPTION - LOCATION: LOCATION: CHEST

## 2019-04-19 ASSESSMENT — HEART SCORE: ECG: 0

## 2019-04-19 NOTE — ED PROVIDER NOTES
LEEP    H/o Molar pregnancy     Hypertension     Hypoglycemia     Mental disorder     on meds in high school for bipolar, not currently taking medications    Uterus bicornis affecting pregnancy      Past Surgical History:   Procedure Laterality Date    APPENDECTOMY  2015    CHOLECYSTECTOMY  2016    DILATION AND CURETTAGE OF UTERUS      LEEP      TONSILLECTOMY  2016     Family History   Problem Relation Age of Onset    Diabetes Mother      Social History     Socioeconomic History    Marital status: Single     Spouse name: Not on file    Number of children: Not on file    Years of education: Not on file    Highest education level: Not on file   Occupational History    Not on file   Social Needs    Financial resource strain: Not on file    Food insecurity:     Worry: Not on file     Inability: Not on file    Transportation needs:     Medical: Not on file     Non-medical: Not on file   Tobacco Use    Smoking status: Former Smoker     Packs/day: 0.50     Types: Cigarettes     Last attempt to quit: 10/24/2016     Years since quittin.4    Smokeless tobacco: Former User     Quit date: 10/29/2016   Substance and Sexual Activity    Alcohol use: No    Drug use: Yes     Comment: 2016 ED visit with accidental overdose of heroin. Per records patient  has used heroin recreationally. Pt currently denies drug use.     Sexual activity: Yes     Partners: Male   Lifestyle    Physical activity:     Days per week: Not on file     Minutes per session: Not on file    Stress: Not on file   Relationships    Social connections:     Talks on phone: Not on file     Gets together: Not on file     Attends Mosque service: Not on file     Active member of club or organization: Not on file     Attends meetings of clubs or organizations: Not on file     Relationship status: Not on file    Intimate partner violence:     Fear of current or ex partner: Not on file     Emotionally abused: Not on file absence of a cardiologist) sinus rhythm at 70 bpm.  Occasional PVCs. Normal axis. Normal QT interval.  No ST or T-wave changes noted. As compared to April 18, 2019 there is no significant change. MDM:  66-year-old female who presents with intermittent chest pain for the past 2 weeks. Patient did come to the ER last night and received EKG and chest x-ray the left before evaluation. Chest x-ray reviewed from yesterday and is unremarkable. EKG shows occasional PVCs which is consistent with her story of history of \"irregular heart beat\". There are no ischemic changes. Consider myocardial infarction, PE, pneumonia, GERD, pancreatitis, cholecystitis. Patient has no cardiac risk factors except previous history of hypertension that no longer requires treatment. Troponin is undetectable after 2 weeks of intermittent chest pain and the last episode 4 hours prior to arrival.  Patient has a well's score of 0. Her symptoms do not sound consistent with PE. She has been seen in the ED before with similar symptoms most recently in December. I have reviewed that chart. At that time she had similar history with negative CT chest.  I do not feel further workup is indicated for PE at this time. She has no tachycardia or hypoxia. She has no elevation in troponin after several episodes of chest pain over the last 2 weeks and the most frequent episode being 4 hours prior to arrival.  She does have an appointment with her cardiologist which she is encouraged to keep. At this time I feel she is stable for discharge home. I will start her on Pepcid as I feel this may be related to GERD as symptoms occur while sleeping and lying down. Advised to discontinue caffeine. Advised to return for any new or worsening symptoms. Clinical Impression:  1.  Chest pain, unspecified type          Disposition referral (if applicable):  [facility] Emergency Department  [place] [id number] [phone]    As needed, If symptoms worsen    Your cardiologist or primary care provider    Schedule an appointment as soon as possible for a visit in 3 days  to recheck today's complaints      Disposition medications (if applicable):  New Prescriptions    FAMOTIDINE (PEPCID) 20 MG TABLET    Take 1 tablet by mouth 2 times daily for 14 days         Comment: Please note this report has been produced using speech recognition software and may contain errors related to that system including errors in grammar, punctuation, and spelling, as well as words and phrases that may be inappropriate.  If there are any questions or concerns please feel free to contact the dictating physician for clarification      MD Geovanni Orr MD  04/19/19 4771

## 2019-04-19 NOTE — ED NOTES
Pt verbalized understanding of discharge instructions. Pt ambulated out of the dept with prescriptions.        Darby Shea RN  04/19/19 1839

## 2019-07-13 ENCOUNTER — APPOINTMENT (OUTPATIENT)
Dept: GENERAL RADIOLOGY | Age: 29
End: 2019-07-13
Payer: MEDICAID

## 2019-07-13 ENCOUNTER — HOSPITAL ENCOUNTER (EMERGENCY)
Age: 29
Discharge: HOME | End: 2019-07-13
Attending: EMERGENCY MEDICINE
Payer: MEDICAID

## 2019-07-13 VITALS
RESPIRATION RATE: 16 BRPM | SYSTOLIC BLOOD PRESSURE: 134 MMHG | WEIGHT: 244 LBS | TEMPERATURE: 98.2 F | BODY MASS INDEX: 46.07 KG/M2 | HEART RATE: 86 BPM | OXYGEN SATURATION: 97 % | DIASTOLIC BLOOD PRESSURE: 85 MMHG | HEIGHT: 61 IN

## 2019-07-13 DIAGNOSIS — I49.3 PVC (PREMATURE VENTRICULAR CONTRACTION): ICD-10-CM

## 2019-07-13 DIAGNOSIS — R07.9 ACUTE CHEST PAIN: Primary | ICD-10-CM

## 2019-07-13 LAB
A/G RATIO: 1 (ref 1.1–2.2)
ALBUMIN SERPL-MCNC: 3.9 G/DL (ref 3.4–5)
ALP BLD-CCNC: 92 U/L (ref 40–129)
ALT SERPL-CCNC: 34 U/L (ref 10–40)
ANION GAP SERPL CALCULATED.3IONS-SCNC: 14 MMOL/L (ref 3–16)
AST SERPL-CCNC: 22 U/L (ref 15–37)
BASOPHILS ABSOLUTE: 0 K/UL (ref 0–0.2)
BASOPHILS RELATIVE PERCENT: 0.4 %
BILIRUB SERPL-MCNC: 0.3 MG/DL (ref 0–1)
BUN BLDV-MCNC: 10 MG/DL (ref 7–20)
CALCIUM SERPL-MCNC: 8.9 MG/DL (ref 8.3–10.6)
CHLORIDE BLD-SCNC: 107 MMOL/L (ref 99–110)
CO2: 20 MMOL/L (ref 21–32)
CREAT SERPL-MCNC: 0.7 MG/DL (ref 0.6–1.1)
EOSINOPHILS ABSOLUTE: 0.2 K/UL (ref 0–0.6)
EOSINOPHILS RELATIVE PERCENT: 2.3 %
GFR AFRICAN AMERICAN: >60
GFR NON-AFRICAN AMERICAN: >60
GLOBULIN: 3.9 G/DL
GLUCOSE BLD-MCNC: 125 MG/DL (ref 70–99)
HCT VFR BLD CALC: 39.4 % (ref 36–48)
HEMOGLOBIN: 13 G/DL (ref 12–16)
LYMPHOCYTES ABSOLUTE: 2.8 K/UL (ref 1–5.1)
LYMPHOCYTES RELATIVE PERCENT: 30.5 %
MCH RBC QN AUTO: 29.1 PG (ref 26–34)
MCHC RBC AUTO-ENTMCNC: 33.1 G/DL (ref 31–36)
MCV RBC AUTO: 87.9 FL (ref 80–100)
MONOCYTES ABSOLUTE: 1 K/UL (ref 0–1.3)
MONOCYTES RELATIVE PERCENT: 11.1 %
NEUTROPHILS ABSOLUTE: 5.1 K/UL (ref 1.7–7.7)
NEUTROPHILS RELATIVE PERCENT: 55.7 %
PDW BLD-RTO: 13.6 % (ref 12.4–15.4)
PLATELET # BLD: 250 K/UL (ref 135–450)
PMV BLD AUTO: 9.2 FL (ref 5–10.5)
POTASSIUM SERPL-SCNC: 3.5 MMOL/L (ref 3.5–5.1)
RBC # BLD: 4.48 M/UL (ref 4–5.2)
SODIUM BLD-SCNC: 141 MMOL/L (ref 136–145)
TOTAL PROTEIN: 7.8 G/DL (ref 6.4–8.2)
TROPONIN: <0.01 NG/ML
WBC # BLD: 9.2 K/UL (ref 4–11)

## 2019-07-13 PROCEDURE — 93005 ELECTROCARDIOGRAM TRACING: CPT | Performed by: EMERGENCY MEDICINE

## 2019-07-13 PROCEDURE — 80053 COMPREHEN METABOLIC PANEL: CPT

## 2019-07-13 PROCEDURE — 84484 ASSAY OF TROPONIN QUANT: CPT

## 2019-07-13 PROCEDURE — 99285 EMERGENCY DEPT VISIT HI MDM: CPT

## 2019-07-13 PROCEDURE — 85025 COMPLETE CBC W/AUTO DIFF WBC: CPT

## 2019-07-13 PROCEDURE — 71046 X-RAY EXAM CHEST 2 VIEWS: CPT

## 2019-07-13 RX ORDER — NAPROXEN 500 MG/1
500 TABLET ORAL 2 TIMES DAILY
Qty: 20 TABLET | Refills: 0 | Status: SHIPPED | OUTPATIENT
Start: 2019-07-13 | End: 2021-08-04

## 2019-07-13 ASSESSMENT — ENCOUNTER SYMPTOMS
ABDOMINAL DISTENTION: 0
BLOOD IN STOOL: 0
ABDOMINAL PAIN: 0
BACK PAIN: 0
COUGH: 0
NAUSEA: 0
RECTAL PAIN: 0
DIARRHEA: 0
STRIDOR: 0
ANAL BLEEDING: 0
COLOR CHANGE: 0
SHORTNESS OF BREATH: 0
CONSTIPATION: 0
WHEEZING: 0
VOMITING: 0

## 2019-07-13 ASSESSMENT — PAIN DESCRIPTION - ONSET: ONSET: SUDDEN

## 2019-07-13 ASSESSMENT — PAIN SCALES - GENERAL: PAINLEVEL_OUTOF10: 7

## 2019-07-13 ASSESSMENT — PAIN DESCRIPTION - PROGRESSION: CLINICAL_PROGRESSION: GRADUALLY IMPROVING

## 2019-07-13 ASSESSMENT — PAIN DESCRIPTION - LOCATION: LOCATION: CHEST

## 2019-07-13 ASSESSMENT — PAIN DESCRIPTION - FREQUENCY: FREQUENCY: CONTINUOUS

## 2019-07-13 ASSESSMENT — PAIN DESCRIPTION - DIRECTION: RADIATING_TOWARDS: LEFT ARM

## 2019-07-13 ASSESSMENT — HEART SCORE: ECG: 1

## 2019-07-13 ASSESSMENT — PAIN DESCRIPTION - PAIN TYPE: TYPE: ACUTE PAIN

## 2019-07-13 NOTE — ED NOTES
Pt alert and oriented, C/O CP that radiates to left arm, heart rate regular, S1, S2 heard. Cap refill < 3 sec, radial pulse 2+, no signs of edema or JVD and lung sounds clear. Pt has hx of PVC's.         Candida Noble RN  07/13/19 1956

## 2019-07-13 NOTE — ED PROVIDER NOTES
[facility]        Pt Name: Yamel Saenz  MRN: [id number]  [place] 1990  Date of evaluation: 7/13/2019  Provider: Artis Collins PA-C  PCP: Referring Not In System (Inactive)    This patient was seen and evaluated by the attending physician Dr. Brian Reyes   Patient presents with    Chest Pain     Pt in by EMS. Pt was driving when she began having CP radiating down left arm. Hx of PVC's. EMS gave 324mg ASA enroute. Denies SOB and n/v at this time. HISTORY OF PRESENT ILLNESS   (Location/Symptom, Timing/Onset, Context/Setting, Quality, Duration, Modifying Factors, Severity)  Note limiting factors. Yamel Saenz is a 34 y.o. female with history of PVC and bipolar disorder, hypertension, hypoglycemia who presents to the emergency department complaining of left-sided chest pain starting while she was driving home from visiting a family member. She did not feel particularly stressed at that time. She states that the pain radiated into her left arm causing numbness tingling sensation in her left hand. She is very tearful and appears very anxious. She has had chest pain events multiple times and in fact has been here multiple times for chest pain. She denies palpitations or shortness of breath at this time. Denies productive cough, fever, chills, hemoptysis, pain or swelling in activities, unexpected weight gain or weight loss, abdominal pain, nausea, vomiting, diarrhea or back pain. She describes the chest pain as a squeezing sensation and has improved significantly since the onset 30 minutes ago. She was brought here by squad and received aspirin in route. Nursing Notes were all reviewed and agreed with or any disagreements were addressed  in the HPI.     REVIEW OF SYSTEMS    (2-9 systems for level 4, 10 or more for level 5)     Review of Systems   Constitutional: Negative Marital status: Single     Spouse name: None    Number of children: None    Years of education: None    Highest education level: None   Occupational History    None   Social Needs    Financial resource strain: None    Food insecurity:     Worry: None     Inability: None    Transportation needs:     Medical: None     Non-medical: None   Tobacco Use    Smoking status: Former Smoker     Packs/day: 0.50     Types: Cigarettes     Last attempt to quit: 10/24/2016     Years since quittin.7    Smokeless tobacco: Former User     Quit date: 10/29/2016   Substance and Sexual Activity    Alcohol use: No    Drug use: Not Currently     Comment: 2016 ED visit with accidental overdose of heroin. Per records patient  has used heroin recreationally. Pt currently denies drug use.     Sexual activity: Yes     Partners: Male   Lifestyle    Physical activity:     Days per week: None     Minutes per session: None    Stress: None   Relationships    Social connections:     Talks on phone: None     Gets together: None     Attends Gnosticist service: None     Active member of club or organization: None     Attends meetings of clubs or organizations: None     Relationship status: None    Intimate partner violence:     Fear of current or ex partner: None     Emotionally abused: None     Physically abused: None     Forced sexual activity: None   Other Topics Concern    None   Social History Narrative    ** Merged History Encounter **         ** Merged History Encounter **            SCREENINGS      Heart Score for chest pain patients  History: Slightly Suspicious  ECG: Non-Specifc repolarization disturbance/LBTB/PM  Patient Age: < 45 years  *Risk factors for Atherosclerotic disease: Cigarette smoking, Obesity  Risk Factors: 1 or 2 risk factors  Troponin: < 1X normal limit  Heart Score Total: 2      PHYSICAL EXAM    (up to 7 for level 4, 8 or more for level 5)     ED Triage Vitals [19 1937]   BP Temp Temp Source normal limits    Narrative:     Performed at:  [facility]  [address] [place], [address]   Phone [phone]   CBC WITH AUTO DIFFERENTIAL    Narrative:     Performed at:  [facility]  [address] [place],  [place], [address]   Phone [phone]   TROPONIN    Narrative:     Performed at:  [facility]  [address] [facility]  [place], [address]   Phone [phone]       All other labs were within normal range or not returned as of this dictation. EKG: All EKG's are interpreted by the Emergency Department Physician in the absence of a cardiologist.  Please see their note for interpretation of EKG. RADIOLOGY:   Non-plain film images such as CT, Ultrasound and MRI are read by the radiologist. Plain radiographic images are visualized andpreliminarily interpreted by the  ED Provider with the below findings:        Interpretation [facility] Radiologist below, if available at the time of this note:    XR CHEST STANDARD (2 VW)   Final Result   No acute cardiopulmonary disease. No results found. PROCEDURES   Unless otherwise noted below, none     Procedures    CRITICAL CARE TIME   N/A    CONSULTS:  None      EMERGENCY DEPARTMENT COURSE and DIFFERENTIAL DIAGNOSIS/MDM:   Vitals:    Vitals:    07/13/19 1937 07/13/19 1945   BP: 133/75 131/76   Pulse: 86 90   Resp: 16 16   Temp: 98.2 °F (36.8 °C)    TempSrc: Oral    SpO2: 98% 98%   Weight: 244 lb (110.7 kg)    Height: 5' 1\" (1.549 m)        Patient was given thefollowing medications:  Medications - No data to display    This patient presents complaining of left-sided chest pain with radiation to left arm. Risk factors are low overall, therefore we do feel that she can be safely discharged home. Also, patient has history of frequent chest pain and anxiety. EKG does show frequent PVC which patient has history of.   She is advised strongly to follow-up with

## 2019-07-14 LAB
EKG ATRIAL RATE: 97 BPM
EKG DIAGNOSIS: NORMAL
EKG P AXIS: 53 DEGREES
EKG P-R INTERVAL: 148 MS
EKG Q-T INTERVAL: 394 MS
EKG QRS DURATION: 82 MS
EKG QTC CALCULATION (BAZETT): 500 MS
EKG R AXIS: 21 DEGREES
EKG T AXIS: 14 DEGREES
EKG VENTRICULAR RATE: 97 BPM

## 2019-07-14 PROCEDURE — 93010 ELECTROCARDIOGRAM REPORT: CPT | Performed by: INTERNAL MEDICINE

## 2019-08-06 ENCOUNTER — HOSPITAL ENCOUNTER (EMERGENCY)
Age: 29
Discharge: HOME | End: 2019-08-07
Attending: EMERGENCY MEDICINE
Payer: MEDICAID

## 2019-08-06 ENCOUNTER — APPOINTMENT (OUTPATIENT)
Dept: GENERAL RADIOLOGY | Age: 29
End: 2019-08-06
Payer: MEDICAID

## 2019-08-06 DIAGNOSIS — R00.2 PALPITATIONS: ICD-10-CM

## 2019-08-06 DIAGNOSIS — I49.3 PVC (PREMATURE VENTRICULAR CONTRACTION): ICD-10-CM

## 2019-08-06 DIAGNOSIS — R07.9 CHEST PAIN, UNSPECIFIED TYPE: Primary | ICD-10-CM

## 2019-08-06 DIAGNOSIS — R42 DIZZINESS: ICD-10-CM

## 2019-08-06 LAB
BASOPHILS ABSOLUTE: 0.1 K/UL (ref 0–0.2)
BASOPHILS RELATIVE PERCENT: 0.6 %
EOSINOPHILS ABSOLUTE: 0.2 K/UL (ref 0–0.6)
EOSINOPHILS RELATIVE PERCENT: 2 %
HCT VFR BLD CALC: 39.4 % (ref 36–48)
HEMOGLOBIN: 13.2 G/DL (ref 12–16)
LYMPHOCYTES ABSOLUTE: 3 K/UL (ref 1–5.1)
LYMPHOCYTES RELATIVE PERCENT: 33.3 %
MCH RBC QN AUTO: 29.1 PG (ref 26–34)
MCHC RBC AUTO-ENTMCNC: 33.4 G/DL (ref 31–36)
MCV RBC AUTO: 87.2 FL (ref 80–100)
MONOCYTES ABSOLUTE: 1.1 K/UL (ref 0–1.3)
MONOCYTES RELATIVE PERCENT: 12 %
NEUTROPHILS ABSOLUTE: 4.8 K/UL (ref 1.7–7.7)
NEUTROPHILS RELATIVE PERCENT: 52.1 %
PDW BLD-RTO: 13.7 % (ref 12.4–15.4)
PLATELET # BLD: 238 K/UL (ref 135–450)
PMV BLD AUTO: 9.2 FL (ref 5–10.5)
RBC # BLD: 4.52 M/UL (ref 4–5.2)
WBC # BLD: 9.1 K/UL (ref 4–11)

## 2019-08-06 PROCEDURE — 93005 ELECTROCARDIOGRAM TRACING: CPT | Performed by: EMERGENCY MEDICINE

## 2019-08-06 PROCEDURE — 85379 FIBRIN DEGRADATION QUANT: CPT

## 2019-08-06 PROCEDURE — 84703 CHORIONIC GONADOTROPIN ASSAY: CPT

## 2019-08-06 PROCEDURE — 80053 COMPREHEN METABOLIC PANEL: CPT

## 2019-08-06 PROCEDURE — 85025 COMPLETE CBC W/AUTO DIFF WBC: CPT

## 2019-08-06 PROCEDURE — 84484 ASSAY OF TROPONIN QUANT: CPT

## 2019-08-06 PROCEDURE — 71045 X-RAY EXAM CHEST 1 VIEW: CPT

## 2019-08-06 PROCEDURE — 99285 EMERGENCY DEPT VISIT HI MDM: CPT

## 2019-08-06 ASSESSMENT — PAIN DESCRIPTION - LOCATION: LOCATION: CHEST

## 2019-08-06 ASSESSMENT — PAIN SCALES - GENERAL: PAINLEVEL_OUTOF10: 8

## 2019-08-06 ASSESSMENT — PAIN DESCRIPTION - PAIN TYPE: TYPE: ACUTE PAIN

## 2019-08-06 ASSESSMENT — PAIN DESCRIPTION - DESCRIPTORS: DESCRIPTORS: SHARP

## 2019-08-07 VITALS
BODY MASS INDEX: 46.07 KG/M2 | HEIGHT: 61 IN | SYSTOLIC BLOOD PRESSURE: 139 MMHG | WEIGHT: 244 LBS | OXYGEN SATURATION: 98 % | TEMPERATURE: 98.2 F | HEART RATE: 77 BPM | RESPIRATION RATE: 16 BRPM | DIASTOLIC BLOOD PRESSURE: 72 MMHG

## 2019-08-07 LAB
A/G RATIO: 0.9 (ref 1.1–2.2)
ALBUMIN SERPL-MCNC: 3.7 G/DL (ref 3.4–5)
ALP BLD-CCNC: 102 U/L (ref 40–129)
ALT SERPL-CCNC: 49 U/L (ref 10–40)
ANION GAP SERPL CALCULATED.3IONS-SCNC: 13 MMOL/L (ref 3–16)
AST SERPL-CCNC: 25 U/L (ref 15–37)
BILIRUB SERPL-MCNC: 0.3 MG/DL (ref 0–1)
BUN BLDV-MCNC: 11 MG/DL (ref 7–20)
CALCIUM SERPL-MCNC: 9 MG/DL (ref 8.3–10.6)
CHLORIDE BLD-SCNC: 105 MMOL/L (ref 99–110)
CO2: 21 MMOL/L (ref 21–32)
CREAT SERPL-MCNC: 0.7 MG/DL (ref 0.6–1.1)
D DIMER: <200 NG/ML DDU (ref 0–229)
EKG ATRIAL RATE: 83 BPM
EKG DIAGNOSIS: NORMAL
EKG P AXIS: 52 DEGREES
EKG P-R INTERVAL: 160 MS
EKG Q-T INTERVAL: 370 MS
EKG QRS DURATION: 74 MS
EKG QTC CALCULATION (BAZETT): 434 MS
EKG R AXIS: 58 DEGREES
EKG T AXIS: -6 DEGREES
EKG VENTRICULAR RATE: 83 BPM
GFR AFRICAN AMERICAN: >60
GFR NON-AFRICAN AMERICAN: >60
GLOBULIN: 4.1 G/DL
GLUCOSE BLD-MCNC: 106 MG/DL (ref 70–99)
HCG QUALITATIVE: NEGATIVE
POTASSIUM REFLEX MAGNESIUM: 4.1 MMOL/L (ref 3.5–5.1)
SODIUM BLD-SCNC: 139 MMOL/L (ref 136–145)
TOTAL PROTEIN: 7.8 G/DL (ref 6.4–8.2)
TROPONIN: <0.01 NG/ML

## 2019-08-07 PROCEDURE — 93010 ELECTROCARDIOGRAM REPORT: CPT | Performed by: INTERNAL MEDICINE

## 2019-08-07 NOTE — ED PROVIDER NOTES
noted to be negative. PHYSICAL EXAM  /72   Pulse 77   Temp 98.2 °F (36.8 °C)   Resp 16   Ht 5' 1\" (1.549 m)   Wt 244 lb (110.7 kg)   SpO2 98%   BMI 46.10 kg/m²     GENERAL APPEARANCE: No acute distress, nontoxic and non-ill-appearing, fully developed well-nourished adult female. HEAD: Normocephalic. Atraumatic. EYES: EOM's grossly intact. ENT: Mucous membranes are moist.   NECK: Supple. Full Range of motion  HEART: RRR. No murmurs  LUNGS:  Lungs are clear to auscultation. ABDOMEN: Soft. Non-distended. Normal bowel sounds. No organomegaly. Non-tender  EXTREMITIES: No peripheral edema. Moves all extremities equally. No gross deformities of the upperor lower extremities. SKIN: Warm and dry. No acute rashes. NEUROLOGICAL: Alert and oriented. Cranial nerves II-12 are grossly intact, no focal neurologic deficits . PSYCHIATRIC: Normal mood and affect. Physical Exam    LABS  I have reviewed all labs for this visit.    Results for orders placed or performed during the hospital encounter of 08/06/19   CBC Auto Differential   Result Value Ref Range    WBC 9.1 4.0 - 11.0 K/uL    RBC 4.52 4.00 - 5.20 M/uL    Hemoglobin 13.2 12.0 - 16.0 g/dL    Hematocrit 39.4 36.0 - 48.0 %    MCV 87.2 80.0 - 100.0 fL    MCH 29.1 26.0 - 34.0 pg    MCHC 33.4 31.0 - 36.0 g/dL    RDW 13.7 12.4 - 15.4 %    Platelets 720 734 - 063 K/uL    MPV 9.2 5.0 - 10.5 fL    Neutrophils % 52.1 %    Lymphocytes % 33.3 %    Monocytes % 12.0 %    Eosinophils % 2.0 %    Basophils % 0.6 %    Neutrophils # 4.8 1.7 - 7.7 K/uL    Lymphocytes # 3.0 1.0 - 5.1 K/uL    Monocytes # 1.1 0.0 - 1.3 K/uL    Eosinophils # 0.2 0.0 - 0.6 K/uL    Basophils # 0.1 0.0 - 0.2 K/uL   Troponin   Result Value Ref Range    Troponin <0.01 <0.01 ng/mL   D-Dimer, Quantitative   Result Value Ref Range    D-Dimer, Quant <200 0 - 229 ng/mL DDU   Comprehensive Metabolic Panel w/ Reflex to MG   Result Value Ref Range    Sodium 139 136 - 145 mmol/L    Potassium reflex ORDERING SYSTEM PROVIDED HISTORY: SOB TECHNOLOGIST PROVIDED HISTORY: Reason for exam:->SOB Reason for Exam: cp Acuity: Unknown Type of Exam: Unknown FINDINGS: No pneumothorax or pleural effusion. No focal airspace consolidation. Normal heart size and mediastinal contours. Intact skeleton. No acute findings. PROCEDURES    ED COURSE/MDM  Patient was triaged, vital signs are taken and EKG was performed that shows a normal sinus rhythm with a rate of 83 beats per minutes there is no evidence of acute ST elevation isolated T wave inversion in lead III without reciprocal changes this is compared to prior EKG with July 2019  Multiple PVCs on today's EKG similar to previous EKGs  Basic lab work was performed and a chest x-ray was performed that shows no evidence of acute infiltrate  Patient was otherwise stabilized and had negative d-dimer testing negative troponin symptoms persistent x1 week  Patient will be otherwise discharged home to follow-up with cardiology tomorrow return if symptoms worsen or change she is in agreement with this plan. Old records reviewed. Labs and imaging reviewed and results discussed withpatient. Plan of care discussed with patient and family. Patient and family in agreement with plan. Patient was given scripts for the following medications. I counseled patient how to take these medications. Discharge Medication List as of 8/7/2019 12:59 AM          CLINICALIMPRESSION  1. Chest pain, unspecified type    2. Palpitations    3. Dizziness    4. PVC (premature ventricular contraction)        Blood pressure 139/72, pulse 77, temperature 98.2 °F (36.8 °C), resp. rate 16, height 5' 1\" (1.549 m), weight 244 lb (110.7 kg), SpO2 98 %, unknown if currently breastfeeding. DISPOSITION   Israel Tay was discharged to home  in stable condition.                  Terell Bhardwaj DO  08/07/19 0131

## 2019-08-13 ENCOUNTER — APPOINTMENT (OUTPATIENT)
Dept: GENERAL RADIOLOGY | Age: 29
End: 2019-08-13
Payer: MEDICAID

## 2019-08-13 ENCOUNTER — HOSPITAL ENCOUNTER (EMERGENCY)
Age: 29
Discharge: HOME | End: 2019-08-13
Attending: EMERGENCY MEDICINE
Payer: MEDICAID

## 2019-08-13 VITALS
WEIGHT: 240 LBS | OXYGEN SATURATION: 99 % | TEMPERATURE: 97.4 F | RESPIRATION RATE: 20 BRPM | DIASTOLIC BLOOD PRESSURE: 69 MMHG | HEART RATE: 63 BPM | SYSTOLIC BLOOD PRESSURE: 121 MMHG | HEIGHT: 61 IN | BODY MASS INDEX: 45.31 KG/M2

## 2019-08-13 DIAGNOSIS — R00.2 PALPITATIONS: Primary | ICD-10-CM

## 2019-08-13 DIAGNOSIS — I49.3 PVC (PREMATURE VENTRICULAR CONTRACTION): ICD-10-CM

## 2019-08-13 LAB
A/G RATIO: 0.9 (ref 1.1–2.2)
ALBUMIN SERPL-MCNC: 3.6 G/DL (ref 3.4–5)
ALP BLD-CCNC: 91 U/L (ref 40–129)
ALT SERPL-CCNC: 39 U/L (ref 10–40)
ANION GAP SERPL CALCULATED.3IONS-SCNC: 10 MMOL/L (ref 3–16)
AST SERPL-CCNC: 22 U/L (ref 15–37)
BASOPHILS ABSOLUTE: 0 K/UL (ref 0–0.2)
BASOPHILS RELATIVE PERCENT: 0.6 %
BILIRUB SERPL-MCNC: 0.3 MG/DL (ref 0–1)
BUN BLDV-MCNC: 11 MG/DL (ref 7–20)
CALCIUM SERPL-MCNC: 8.7 MG/DL (ref 8.3–10.6)
CHLORIDE BLD-SCNC: 108 MMOL/L (ref 99–110)
CO2: 21 MMOL/L (ref 21–32)
CREAT SERPL-MCNC: <0.5 MG/DL (ref 0.6–1.1)
EKG ATRIAL RATE: 69 BPM
EKG DIAGNOSIS: NORMAL
EKG P AXIS: 38 DEGREES
EKG P-R INTERVAL: 168 MS
EKG Q-T INTERVAL: 396 MS
EKG QRS DURATION: 82 MS
EKG QTC CALCULATION (BAZETT): 424 MS
EKG R AXIS: 10 DEGREES
EKG T AXIS: 5 DEGREES
EKG VENTRICULAR RATE: 69 BPM
EOSINOPHILS ABSOLUTE: 0.2 K/UL (ref 0–0.6)
EOSINOPHILS RELATIVE PERCENT: 2.5 %
GFR AFRICAN AMERICAN: >60
GFR NON-AFRICAN AMERICAN: >60
GLOBULIN: 3.8 G/DL
GLUCOSE BLD-MCNC: 104 MG/DL (ref 70–99)
HCG QUALITATIVE: NEGATIVE
HCT VFR BLD CALC: 38.7 % (ref 36–48)
HEMOGLOBIN: 12.9 G/DL (ref 12–16)
LYMPHOCYTES ABSOLUTE: 2.2 K/UL (ref 1–5.1)
LYMPHOCYTES RELATIVE PERCENT: 31.2 %
MCH RBC QN AUTO: 28.9 PG (ref 26–34)
MCHC RBC AUTO-ENTMCNC: 33.2 G/DL (ref 31–36)
MCV RBC AUTO: 86.9 FL (ref 80–100)
MONOCYTES ABSOLUTE: 0.8 K/UL (ref 0–1.3)
MONOCYTES RELATIVE PERCENT: 10.7 %
NEUTROPHILS ABSOLUTE: 3.9 K/UL (ref 1.7–7.7)
NEUTROPHILS RELATIVE PERCENT: 55 %
PDW BLD-RTO: 13.8 % (ref 12.4–15.4)
PLATELET # BLD: 237 K/UL (ref 135–450)
PMV BLD AUTO: 9.5 FL (ref 5–10.5)
POTASSIUM SERPL-SCNC: 3.7 MMOL/L (ref 3.5–5.1)
RBC # BLD: 4.45 M/UL (ref 4–5.2)
SODIUM BLD-SCNC: 139 MMOL/L (ref 136–145)
TOTAL PROTEIN: 7.4 G/DL (ref 6.4–8.2)
TROPONIN: <0.01 NG/ML
WBC # BLD: 7.2 K/UL (ref 4–11)

## 2019-08-13 PROCEDURE — 84484 ASSAY OF TROPONIN QUANT: CPT

## 2019-08-13 PROCEDURE — 80053 COMPREHEN METABOLIC PANEL: CPT

## 2019-08-13 PROCEDURE — 71046 X-RAY EXAM CHEST 2 VIEWS: CPT

## 2019-08-13 PROCEDURE — 85025 COMPLETE CBC W/AUTO DIFF WBC: CPT

## 2019-08-13 PROCEDURE — 84703 CHORIONIC GONADOTROPIN ASSAY: CPT

## 2019-08-13 PROCEDURE — 93010 ELECTROCARDIOGRAM REPORT: CPT | Performed by: INTERNAL MEDICINE

## 2019-08-13 PROCEDURE — 6360000002 HC RX W HCPCS: Performed by: NURSE PRACTITIONER

## 2019-08-13 PROCEDURE — 96374 THER/PROPH/DIAG INJ IV PUSH: CPT

## 2019-08-13 PROCEDURE — 36415 COLL VENOUS BLD VENIPUNCTURE: CPT

## 2019-08-13 PROCEDURE — 93005 ELECTROCARDIOGRAM TRACING: CPT | Performed by: EMERGENCY MEDICINE

## 2019-08-13 PROCEDURE — 99285 EMERGENCY DEPT VISIT HI MDM: CPT

## 2019-08-13 RX ORDER — KETOROLAC TROMETHAMINE 30 MG/ML
30 INJECTION, SOLUTION INTRAMUSCULAR; INTRAVENOUS ONCE
Status: COMPLETED | OUTPATIENT
Start: 2019-08-13 | End: 2019-08-13

## 2019-08-13 RX ADMIN — KETOROLAC TROMETHAMINE 30 MG: 30 INJECTION, SOLUTION INTRAMUSCULAR at 08:36

## 2019-08-13 ASSESSMENT — ENCOUNTER SYMPTOMS
ABDOMINAL PAIN: 0
DIARRHEA: 0
VOMITING: 0
NAUSEA: 0
SHORTNESS OF BREATH: 0
CHEST TIGHTNESS: 0

## 2019-08-13 ASSESSMENT — PAIN SCALES - GENERAL: PAINLEVEL_OUTOF10: 9

## 2019-08-13 NOTE — ED PROVIDER NOTES
This patient was seen by the Mid-Level Provider. I have seen and examined the patient, agree with the workup, evaluation, management and diagnosis. Care plan has been discussed. My assessment reveals a 66-year-old female who had an hour long episode of chest pain last evening. The patient denies any shortness of breath. She denies any syncope. The patient states that she has this occasionally and takes a beta-blocker for. She has some left arm tingling at the time. She denies any complaints at this time. The patient's work-up was unremarkable. Her EKG showed some PVCs but otherwise was unremarkable. Her troponin was negative. Chest x-ray is normal.  The patient will be discharged with referral back to her primary care physician and/or cardiologist with instructed to return if worse. Exam: Well-nourished female no acute distress. Heart was regular rate rhythm with no murmurs rubs gallops. Chest was clear to auscultation bilaterally. MDM: 66-year-old female presents with chest pain. Her heart score is exceedingly low. She will be discharged with appropriate follow-up. EKG: Sinus rhythm at a rate of 69 beats a minute with no acute ST elevations or depressions or pathologic Q waves. Normal axis.     Results for orders placed or performed during the hospital encounter of 08/13/19   CBC Auto Differential   Result Value Ref Range    WBC 7.2 4.0 - 11.0 K/uL    RBC 4.45 4.00 - 5.20 M/uL    Hemoglobin 12.9 12.0 - 16.0 g/dL    Hematocrit 38.7 36.0 - 48.0 %    MCV 86.9 80.0 - 100.0 fL    MCH 28.9 26.0 - 34.0 pg    MCHC 33.2 31.0 - 36.0 g/dL    RDW 13.8 12.4 - 15.4 %    Platelets 434 858 - 071 K/uL    MPV 9.5 5.0 - 10.5 fL    Neutrophils % 55.0 %    Lymphocytes % 31.2 %    Monocytes % 10.7 %    Eosinophils % 2.5 %    Basophils % 0.6 %    Neutrophils # 3.9 1.7 - 7.7 K/uL    Lymphocytes # 2.2 1.0 - 5.1 K/uL    Monocytes # 0.8 0.0 - 1.3 K/uL    Eosinophils # 0.2 0.0 - 0.6 K/uL    Basophils # 0.0 0.0 - 0.2 K/uL Acuity: Unknown Type of Exam: Unknown FINDINGS: No pneumothorax or pleural effusion. No focal airspace consolidation. Normal heart size and mediastinal contours. Intact skeleton. No acute findings.         Ketan Blanton MD  08/13/19 [id number]

## 2019-08-13 NOTE — ED NOTES
Pt states she has not received her test results. Dr Yusuf Correa made aware and will update pt.      Melissa Quiroz RN  08/13/19 [id number]

## 2019-08-13 NOTE — ED PROVIDER NOTES
Result   No acute process. Xr Chest Portable    Result Date: 8/6/2019  EXAMINATION: ONE XRAY VIEW OF THE CHEST 8/6/2019 11:36 pm COMPARISON: Chest radiographs 07/13/2019 HISTORY: ORDERING SYSTEM PROVIDED HISTORY: SOB TECHNOLOGIST PROVIDED HISTORY: Reason for exam:->SOB Reason for Exam: cp Acuity: Unknown Type of Exam: Unknown FINDINGS: No pneumothorax or pleural effusion. No focal airspace consolidation. Normal heart size and mediastinal contours. Intact skeleton. No acute findings. PROCEDURES   Unless otherwise noted below, none     Procedures    CRITICAL CARE TIME   N/A    CONSULTS:  None      EMERGENCY DEPARTMENT COURSE and DIFFERENTIAL DIAGNOSIS/MDM:   Vitals:    Vitals:    08/13/19 0810 08/13/19 0830 08/13/19 0900   BP: (!) 142/76 128/77 121/69   Pulse: 72 70 63   Resp: 20 22 20   Temp: 97.4 °F (36.3 °C)     TempSrc: Oral     SpO2: 98% 99% 99%   Weight: 240 lb (108.9 kg)     Height: 5' 1\" (1.549 m)         Patient was given thefollowing medications:  Medications   ketorolac (TORADOL) injection 30 mg (30 mg Intravenous Given 8/13/19 0836)       Briefly, this is a 34year old female that presents to the emergency department with complaint of a sharp chest pain that was present last night and lasted about an hour with radiation to the left arm, states that time she felt like the pain was more pins-and-needles and sharp. Pain stopped without intervention. She does have intermittent pain throughout her life. She has history of PVCs and is on metoprolol, takes his medication compliantly. States that today she is having palpitations. She denies any mitigating or exacerbating symptoms. She is chest pain free at this time, has not had chest pain this morning at all. CBC and CMP are unremarkable.   Troponin and pregnancy negative    XR CHEST STANDARD (2 VW) (Final result)   Result time 08/13/19 08:57:48   Final result by Jesenia Curry MD (08/13/19 08:57:48)                Impression:

## 2020-02-21 ENCOUNTER — HOSPITAL ENCOUNTER (EMERGENCY)
Age: 30
Discharge: HOME | End: 2020-02-21
Payer: MEDICAID

## 2020-02-21 VITALS
TEMPERATURE: 97.8 F | HEART RATE: 85 BPM | BODY MASS INDEX: 54.37 KG/M2 | DIASTOLIC BLOOD PRESSURE: 79 MMHG | RESPIRATION RATE: 18 BRPM | WEIGHT: 288 LBS | HEIGHT: 61 IN | OXYGEN SATURATION: 99 % | SYSTOLIC BLOOD PRESSURE: 154 MMHG

## 2020-02-21 PROCEDURE — 2580000003 HC RX 258: Performed by: NURSE PRACTITIONER

## 2020-02-21 PROCEDURE — 99283 EMERGENCY DEPT VISIT LOW MDM: CPT

## 2020-02-21 PROCEDURE — 6360000002 HC RX W HCPCS: Performed by: NURSE PRACTITIONER

## 2020-02-21 PROCEDURE — 96374 THER/PROPH/DIAG INJ IV PUSH: CPT

## 2020-02-21 PROCEDURE — 96375 TX/PRO/DX INJ NEW DRUG ADDON: CPT

## 2020-02-21 RX ORDER — DIPHENHYDRAMINE HYDROCHLORIDE 50 MG/ML
25 INJECTION INTRAMUSCULAR; INTRAVENOUS ONCE
Status: COMPLETED | OUTPATIENT
Start: 2020-02-21 | End: 2020-02-21

## 2020-02-21 RX ORDER — METOCLOPRAMIDE HYDROCHLORIDE 5 MG/ML
10 INJECTION INTRAMUSCULAR; INTRAVENOUS ONCE
Status: COMPLETED | OUTPATIENT
Start: 2020-02-21 | End: 2020-02-21

## 2020-02-21 RX ORDER — 0.9 % SODIUM CHLORIDE 0.9 %
1000 INTRAVENOUS SOLUTION INTRAVENOUS ONCE
Status: COMPLETED | OUTPATIENT
Start: 2020-02-21 | End: 2020-02-21

## 2020-02-21 RX ORDER — BUTALBITAL, ACETAMINOPHEN AND CAFFEINE 300; 40; 50 MG/1; MG/1; MG/1
1 CAPSULE ORAL EVERY 4 HOURS PRN
Qty: 84 CAPSULE | Refills: 0 | Status: SHIPPED | OUTPATIENT
Start: 2020-02-21 | End: 2021-08-04

## 2020-02-21 RX ORDER — KETOROLAC TROMETHAMINE 30 MG/ML
30 INJECTION, SOLUTION INTRAMUSCULAR; INTRAVENOUS ONCE
Status: COMPLETED | OUTPATIENT
Start: 2020-02-21 | End: 2020-02-21

## 2020-02-21 RX ADMIN — KETOROLAC TROMETHAMINE 30 MG: 30 INJECTION, SOLUTION INTRAMUSCULAR; INTRAVENOUS at 21:32

## 2020-02-21 RX ADMIN — SODIUM CHLORIDE 1000 ML: 9 INJECTION, SOLUTION INTRAVENOUS at 21:30

## 2020-02-21 RX ADMIN — METOCLOPRAMIDE HYDROCHLORIDE 10 MG: 5 INJECTION INTRAMUSCULAR; INTRAVENOUS at 21:30

## 2020-02-21 RX ADMIN — DIPHENHYDRAMINE HYDROCHLORIDE 25 MG: 50 INJECTION, SOLUTION INTRAMUSCULAR; INTRAVENOUS at 21:31

## 2020-02-21 ASSESSMENT — ENCOUNTER SYMPTOMS
NAUSEA: 0
ABDOMINAL PAIN: 0
VOMITING: 0
CHEST TIGHTNESS: 0
SHORTNESS OF BREATH: 0
DIARRHEA: 0

## 2020-02-21 ASSESSMENT — PAIN SCALES - GENERAL
PAINLEVEL_OUTOF10: 10
PAINLEVEL_OUTOF10: 10

## 2020-02-22 NOTE — ED PROVIDER NOTES
[facility] [facility]        Pt Name: Tara Genao  MRN: [id number]  [place] 1990  Date of evaluation: 2/21/2020  Provider: NOEMI Helm CNP  PCP: Referring Not In System (Inactive)    This patient was not seen and evaluated by the attending physician No att. providers found. CHIEF COMPLAINT       Chief Complaint   Patient presents with    Headache     left side pain in back of head radiating into neck and shoulders. pt reports she woke up with a \"knot\" on side of neck and it is painful to move head side to side. also pain in jaw. HISTORY OF PRESENT ILLNESS   (Location, Timing/Onset, Context/Setting, Quality, Duration, Modifying Factors, Severity, Associated Signs and Symptoms)  Note limiting factors. Tara Genao is a 27 y.o. female presents to the emergency department complaining of left-sided headache that radiates into the left neck. She reports that opening and closing her mouth is painful as well as turning her head side to side, but not up and down. She denies any injury or trauma. Reports that her headache has waxed and waned since yesterday, rates as a 10 of 10 and dull/aching in nature. She does not have frequent headaches but she has had a similar headache previously. States that ibuprofen is not helping. Denies any fever, lightheadedness, dizziness, visual disturbances. No chest pain or pressure. No back pain. No shortness of breath, cough, or congestion. No abdominal pain, nausea, vomiting, diarrhea, constipation, or dysuria. No rash. Nursing Notes were all reviewed and agreed with or any disagreements were addressed in the HPI. REVIEW OF SYSTEMS    (2-9 systems for level 4, 10 or more for level 5)     Review of Systems   Constitutional: Negative for activity change, chills and fever. Respiratory: Negative for chest tightness and shortness of breath.     Cardiovascular: Negative for chest pain. Gastrointestinal: Negative for abdominal pain, diarrhea, nausea and vomiting. Genitourinary: Negative for dysuria. Neurological: Positive for headaches. All other systems reviewed and are negative. Positives and Pertinent negatives as per HPI. Except as noted above in the ROS, all other systems were reviewed and negative. PAST MEDICAL HISTORY     Past Medical History:   Diagnosis Date    Abnormal heart rhythm     irregular heart rate    Abnormal Pap smear of cervix 2015    LEEP    H/o Molar pregnancy     Hypertension     Hypoglycemia     Mental disorder     on meds in high school for bipolar, not currently taking medications    Uterus bicornis affecting pregnancy          SURGICAL HISTORY     Past Surgical History:   Procedure Laterality Date    APPENDECTOMY  2015    CHOLECYSTECTOMY  2016    DILATION AND CURETTAGE OF UTERUS  2011    LEEP      TONSILLECTOMY  11/29/2016         CURRENTMEDICATIONS       Previous Medications    FAMOTIDINE (PEPCID) 20 MG TABLET    Take 1 tablet by mouth 2 times daily for 14 days    NAPROXEN (NAPROSYN) 500 MG TABLET    Take 1 tablet by mouth 2 times daily for 20 doses         ALLERGIES     Latex    FAMILYHISTORY       Family History   Problem Relation Age of Onset    Diabetes Mother           SOCIAL HISTORY       Social History     Tobacco Use    Smoking status: Former Smoker     Packs/day: 0.50     Types: Cigarettes     Last attempt to quit: 10/24/2016     Years since quitting: 3.3    Smokeless tobacco: Former User     Quit date: 10/29/2016   Substance Use Topics    Alcohol use: No    Drug use: Not Currently     Comment: 5/7/2016 ED visit with accidental overdose of heroin. Per records patient  has used heroin recreationally. Pt currently denies drug use.        SCREENINGS             PHYSICAL EXAM    (up to 7 for level 4, 8 or more for level 5)     ED Triage Vitals [02/21/20 1951]   BP Temp Temp src Pulse Resp SpO2 Height Weight (!) 168/111 97.8 °F (36.6 °C) -- 85 18 100 % 5' 1\" (1.549 m) 288 lb (130.6 kg)       Physical Exam  Vitals signs and nursing note reviewed. Constitutional:       Appearance: She is well-developed. She is not diaphoretic. HENT:      Head: Normocephalic and atraumatic. Right Ear: External ear normal.      Left Ear: External ear normal.   Eyes:      General:         Right eye: No discharge. Left eye: No discharge. Neck:      Musculoskeletal: Normal range of motion and neck supple. Vascular: No JVD. Cardiovascular:      Rate and Rhythm: Normal rate and regular rhythm. Pulses: Normal pulses. Heart sounds: Normal heart sounds. Pulmonary:      Effort: Pulmonary effort is normal. No respiratory distress. Breath sounds: Normal breath sounds. Abdominal:      Palpations: Abdomen is soft. Musculoskeletal: Normal range of motion. Skin:     General: Skin is warm and dry. Coloration: Skin is not pale. Neurological:      Mental Status: She is alert and oriented to person, place, and time. Cranial Nerves: Cranial nerves are intact. Sensory: Sensation is intact. Motor: Motor function is intact. Coordination: Coordination is intact. Psychiatric:         Behavior: Behavior normal.         DIAGNOSTIC RESULTS   LABS:    Labs Reviewed - No data to display    All other labs were within normal range or not returned as of this dictation. EKG: All EKG's are interpreted by the Emergency Department Physician in the absence of a cardiologist.  Please see their note for interpretation of EKG. RADIOLOGY:   Non-plain film images such as CT, Ultrasound and MRI are read by the radiologist. Plain radiographic images are visualized and preliminarily interpreted by the ED Provider with the below findings:        Interpretation per the Radiologist below, if available at the time of this note:    No orders to display     No results found.         PROCEDURES   Unless otherwise noted below, none     Procedures    CRITICAL CARE TIME   N/A    CONSULTS:  None      EMERGENCY DEPARTMENT COURSE and DIFFERENTIAL DIAGNOSIS/MDM:   Vitals:    Vitals:    02/21/20 2145 02/21/20 2200 02/21/20 2215 02/21/20 2230   BP:  (!) 145/81  (!) 147/78   Pulse:       Resp:       Temp:       SpO2: 99% 99% 97% 98%   Weight:       Height:           Patient was given the following medications:  Medications   ketorolac (TORADOL) injection 30 mg (30 mg Intravenous Given 2/21/20 2132)   diphenhydrAMINE (BENADRYL) injection 25 mg (25 mg Intravenous Given 2/21/20 2131)   metoclopramide (REGLAN) injection 10 mg (10 mg Intravenous Given 2/21/20 2130)   0.9 % sodium chloride bolus (1,000 mLs Intravenous New Bag 2/21/20 2130)       Briefly, this is a 27year old female that presents to the emergency department complaining of left-sided headache that radiates into the left neck. She reports that opening and closing her mouth is painful as well as turning her head side to side, but not up and down. She denies any injury or trauma. Reports that her headache has waxed and waned since yesterday, rates as a 10 of 10 and dull/aching in nature. She does not have frequent headaches but she has had a similar headache previously. States that ibuprofen is not helping. Patient was given fluids, Reglan, Benadryl, and Toradol. She is pain-free at time of reassessment. States that symptoms have completely resolved. The patient will be discharged home with medications to treat symptoms and referral to primary care. States that she does see a physician at [facility]. Patient's repeat neurologic examination is normal.  My suspicion for serious pathology is low given a lack of significant risk factors and reassuring history and physical examination. I see nothing to suggest intracranial hemorrhage, meningitis, encephalitis, mass lesion, stroke or thrombosis.   I feel the patient can be safely discharged to home with outpatient follow up. Instructions have been given for the patient to return if there is any significant worsening of the headache or the development of confusion, vision change, weakness, numbness, difficulty with speech or walking. FINAL IMPRESSION      1.  Left-sided headache          DISPOSITION/PLAN   DISPOSITION Decision To Discharge 02/21/2020 10:51:32 PM      PATIENT REFERREDTO:  [facility] Emergency Department  [address] [facility]  [phone]    If symptoms worsen      DISCHARGE MEDICATIONS:  New Prescriptions    BUTALBITAL-APAP-CAFFEINE (FIORICET) -40 MG CAPS PER CAPSULE    Take 1 capsule by mouth every 4 hours as needed for Headaches       DISCONTINUED MEDICATIONS:  Discontinued Medications    METOPROLOL TARTRATE (LOPRESSOR) 25 MG TABLET    Take 25 mg by mouth 2 times daily              (Please note that portions of this note were completed with a voice recognition program.  Efforts were made to edit the dictations but occasionally words are mis-transcribed.)    NOEMI Bishop CNP (electronically signed)           NOEMI Bishop CNP  02/21/20 [id number]

## 2021-08-02 ENCOUNTER — HOSPITAL ENCOUNTER (OUTPATIENT)
Dept: ULTRASOUND IMAGING | Age: 31
Discharge: HOME | End: 2021-08-02
Payer: MEDICAID

## 2021-08-02 DIAGNOSIS — R74.8 ELEVATED LIVER ENZYMES: ICD-10-CM

## 2021-08-02 PROCEDURE — 76705 ECHO EXAM OF ABDOMEN: CPT

## 2021-08-04 ENCOUNTER — HOSPITAL ENCOUNTER (EMERGENCY)
Age: 31
Discharge: HOME | End: 2021-08-04
Payer: MEDICAID

## 2021-08-04 ENCOUNTER — APPOINTMENT (OUTPATIENT)
Dept: CT IMAGING | Age: 31
End: 2021-08-04
Payer: MEDICAID

## 2021-08-04 ENCOUNTER — APPOINTMENT (OUTPATIENT)
Dept: GENERAL RADIOLOGY | Age: 31
End: 2021-08-04
Payer: MEDICAID

## 2021-08-04 VITALS
WEIGHT: 253.3 LBS | SYSTOLIC BLOOD PRESSURE: 145 MMHG | TEMPERATURE: 98.6 F | HEIGHT: 61 IN | OXYGEN SATURATION: 99 % | RESPIRATION RATE: 18 BRPM | HEART RATE: 79 BPM | BODY MASS INDEX: 47.82 KG/M2 | DIASTOLIC BLOOD PRESSURE: 82 MMHG

## 2021-08-04 DIAGNOSIS — Z20.822 LAB TEST NEGATIVE FOR COVID-19 VIRUS: ICD-10-CM

## 2021-08-04 DIAGNOSIS — F41.8 ANXIETY ABOUT HEALTH: ICD-10-CM

## 2021-08-04 DIAGNOSIS — R07.9 CHEST PAIN, UNSPECIFIED TYPE: Primary | ICD-10-CM

## 2021-08-04 DIAGNOSIS — Z98.890 HISTORY OF RADIOFREQUENCY ABLATION (RFA) PROCEDURE FOR CARDIAC ARRHYTHMIA: ICD-10-CM

## 2021-08-04 LAB
A/G RATIO: 1.1 (ref 1.1–2.2)
ALBUMIN SERPL-MCNC: 4 G/DL (ref 3.4–5)
ALP BLD-CCNC: 95 U/L (ref 40–129)
ALT SERPL-CCNC: 81 U/L (ref 10–40)
ANION GAP SERPL CALCULATED.3IONS-SCNC: 11 MMOL/L (ref 3–16)
AST SERPL-CCNC: 39 U/L (ref 15–37)
BASE EXCESS VENOUS: -0.5 MMOL/L (ref -3–3)
BASOPHILS ABSOLUTE: 0 K/UL (ref 0–0.2)
BASOPHILS RELATIVE PERCENT: 0.4 %
BILIRUB SERPL-MCNC: 0.5 MG/DL (ref 0–1)
BILIRUBIN URINE: NEGATIVE
BLOOD, URINE: NEGATIVE
BUN BLDV-MCNC: 10 MG/DL (ref 7–20)
CALCIUM SERPL-MCNC: 9.3 MG/DL (ref 8.3–10.6)
CARBOXYHEMOGLOBIN: 2.8 % (ref 0–1.5)
CHLORIDE BLD-SCNC: 105 MMOL/L (ref 99–110)
CLARITY: CLEAR
CO2: 22 MMOL/L (ref 21–32)
COLOR: YELLOW
CREAT SERPL-MCNC: <0.5 MG/DL (ref 0.6–1.1)
D DIMER: 239 NG/ML DDU (ref 0–229)
EKG ATRIAL RATE: 84 BPM
EKG DIAGNOSIS: NORMAL
EKG P AXIS: 38 DEGREES
EKG P-R INTERVAL: 160 MS
EKG Q-T INTERVAL: 362 MS
EKG QRS DURATION: 82 MS
EKG QTC CALCULATION (BAZETT): 427 MS
EKG R AXIS: 7 DEGREES
EKG T AXIS: 8 DEGREES
EKG VENTRICULAR RATE: 84 BPM
EOSINOPHILS ABSOLUTE: 0.1 K/UL (ref 0–0.6)
EOSINOPHILS RELATIVE PERCENT: 0.9 %
GFR AFRICAN AMERICAN: >60
GFR NON-AFRICAN AMERICAN: >60
GLOBULIN: 3.5 G/DL
GLUCOSE BLD-MCNC: 113 MG/DL (ref 70–99)
GLUCOSE URINE: NEGATIVE MG/DL
HCO3 VENOUS: 21.5 MMOL/L (ref 23–29)
HCT VFR BLD CALC: 37.4 % (ref 36–48)
HEMOGLOBIN: 12.8 G/DL (ref 12–16)
INR BLD: 1.07 (ref 0.88–1.12)
KETONES, URINE: NEGATIVE MG/DL
LEUKOCYTE ESTERASE, URINE: NEGATIVE
LYMPHOCYTES ABSOLUTE: 1.7 K/UL (ref 1–5.1)
LYMPHOCYTES RELATIVE PERCENT: 14.5 %
MCH RBC QN AUTO: 30.3 PG (ref 26–34)
MCHC RBC AUTO-ENTMCNC: 34.3 G/DL (ref 31–36)
MCV RBC AUTO: 88.4 FL (ref 80–100)
METHEMOGLOBIN VENOUS: 0.2 %
MICROSCOPIC EXAMINATION: NORMAL
MONOCYTES ABSOLUTE: 1.1 K/UL (ref 0–1.3)
MONOCYTES RELATIVE PERCENT: 9.6 %
NEUTROPHILS ABSOLUTE: 8.8 K/UL (ref 1.7–7.7)
NEUTROPHILS RELATIVE PERCENT: 74.6 %
NITRITE, URINE: NEGATIVE
O2 CONTENT, VEN: 18 VOL %
O2 SAT, VEN: >100 %
O2 THERAPY: ABNORMAL
PCO2, VEN: 27.5 MMHG (ref 40–50)
PDW BLD-RTO: 13.4 % (ref 12.4–15.4)
PH UA: 8 (ref 5–8)
PH VENOUS: 7.5 (ref 7.35–7.45)
PLATELET # BLD: 228 K/UL (ref 135–450)
PMV BLD AUTO: 9.1 FL (ref 5–10.5)
PO2, VEN: 185 MMHG (ref 25–40)
POTASSIUM REFLEX MAGNESIUM: 3.6 MMOL/L (ref 3.5–5.1)
PRO-BNP: 56 PG/ML (ref 0–124)
PROTEIN UA: NEGATIVE MG/DL
PROTHROMBIN TIME: 12.1 SEC (ref 9.9–12.7)
RBC # BLD: 4.23 M/UL (ref 4–5.2)
SARS-COV-2, NAAT: NOT DETECTED
SODIUM BLD-SCNC: 138 MMOL/L (ref 136–145)
SPECIFIC GRAVITY UA: 1.02 (ref 1–1.03)
TCO2 CALC VENOUS: 50 MMOL/L
TOTAL PROTEIN: 7.5 G/DL (ref 6.4–8.2)
TROPONIN: <0.01 NG/ML
TROPONIN: <0.01 NG/ML
URINE REFLEX TO CULTURE: NORMAL
URINE TYPE: NORMAL
UROBILINOGEN, URINE: 1 E.U./DL
WBC # BLD: 11.8 K/UL (ref 4–11)

## 2021-08-04 PROCEDURE — 83880 ASSAY OF NATRIURETIC PEPTIDE: CPT

## 2021-08-04 PROCEDURE — 71045 X-RAY EXAM CHEST 1 VIEW: CPT

## 2021-08-04 PROCEDURE — 99283 EMERGENCY DEPT VISIT LOW MDM: CPT

## 2021-08-04 PROCEDURE — 93010 ELECTROCARDIOGRAM REPORT: CPT | Performed by: INTERNAL MEDICINE

## 2021-08-04 PROCEDURE — 80053 COMPREHEN METABOLIC PANEL: CPT

## 2021-08-04 PROCEDURE — 85379 FIBRIN DEGRADATION QUANT: CPT

## 2021-08-04 PROCEDURE — 81003 URINALYSIS AUTO W/O SCOPE: CPT

## 2021-08-04 PROCEDURE — 36415 COLL VENOUS BLD VENIPUNCTURE: CPT

## 2021-08-04 PROCEDURE — 82803 BLOOD GASES ANY COMBINATION: CPT

## 2021-08-04 PROCEDURE — 85025 COMPLETE CBC W/AUTO DIFF WBC: CPT

## 2021-08-04 PROCEDURE — 6360000004 HC RX CONTRAST MEDICATION: Performed by: PHYSICIAN ASSISTANT

## 2021-08-04 PROCEDURE — 71260 CT THORAX DX C+: CPT

## 2021-08-04 PROCEDURE — 85610 PROTHROMBIN TIME: CPT

## 2021-08-04 PROCEDURE — 87635 SARS-COV-2 COVID-19 AMP PRB: CPT

## 2021-08-04 PROCEDURE — 84484 ASSAY OF TROPONIN QUANT: CPT

## 2021-08-04 PROCEDURE — 93005 ELECTROCARDIOGRAM TRACING: CPT

## 2021-08-04 PROCEDURE — 6370000000 HC RX 637 (ALT 250 FOR IP): Performed by: PHYSICIAN ASSISTANT

## 2021-08-04 RX ORDER — ASPIRIN 81 MG/1
324 TABLET, CHEWABLE ORAL ONCE
Status: COMPLETED | OUTPATIENT
Start: 2021-08-04 | End: 2021-08-04

## 2021-08-04 RX ORDER — LISINOPRIL 10 MG/1
10 TABLET ORAL DAILY
COMMUNITY
End: 2021-09-17

## 2021-08-04 RX ADMIN — IOPAMIDOL 75 ML: 755 INJECTION, SOLUTION INTRAVENOUS at 14:18

## 2021-08-04 RX ADMIN — ASPIRIN 324 MG: 81 TABLET, CHEWABLE ORAL at 12:03

## 2021-08-04 ASSESSMENT — ENCOUNTER SYMPTOMS
ABDOMINAL PAIN: 0
VOMITING: 0
DIARRHEA: 0
NAUSEA: 0
SHORTNESS OF BREATH: 1
COUGH: 1
CHEST TIGHTNESS: 0

## 2021-08-04 ASSESSMENT — PAIN DESCRIPTION - PAIN TYPE: TYPE: ACUTE PAIN

## 2021-08-04 ASSESSMENT — PAIN DESCRIPTION - DESCRIPTORS: DESCRIPTORS: TIGHTNESS

## 2021-08-04 ASSESSMENT — PAIN DESCRIPTION - ORIENTATION: ORIENTATION: LEFT;UPPER

## 2021-08-04 ASSESSMENT — PAIN SCALES - GENERAL: PAINLEVEL_OUTOF10: 9

## 2021-08-04 ASSESSMENT — PAIN DESCRIPTION - FREQUENCY: FREQUENCY: INTERMITTENT

## 2021-08-04 ASSESSMENT — PAIN - FUNCTIONAL ASSESSMENT: PAIN_FUNCTIONAL_ASSESSMENT: PREVENTS OR INTERFERES SOME ACTIVE ACTIVITIES AND ADLS

## 2021-08-04 ASSESSMENT — PAIN DESCRIPTION - PROGRESSION: CLINICAL_PROGRESSION: GRADUALLY WORSENING

## 2021-08-04 ASSESSMENT — PAIN DESCRIPTION - ONSET: ONSET: ON-GOING

## 2021-08-04 ASSESSMENT — PAIN DESCRIPTION - LOCATION: LOCATION: CHEST

## 2021-08-04 NOTE — ED TRIAGE NOTES
Pt states started having left sided chest pain 3 weeks ago, sees cardiologist, comes and goes, SOB, had ablasion in december 2020

## 2021-08-04 NOTE — ED PROVIDER NOTES
upper respiratory in nature that she has had in the same timeframe. She has had some mild fatigue malaise and tells me upon questioning that she does have concerns for Covid. She has not had Covid and has not been vaccinated. She goes on to report she is not experiencing any GI  or gynecologic complaints. No additional complaints or concerns otherwise noted. She is done nothing for the above-mentioned does have an upcoming appointment with cardiology in 3 weeks but because of this she presents the ED for evaluation and treatment. Nursing Notes were all reviewed and agreed with or any disagreements were addressed in the HPI. REVIEW OF SYSTEMS    (2-9 systems for level 4, 10 or more for level 5)     Review of Systems   Constitutional: Negative for activity change, chills and fever. Respiratory: Positive for cough and shortness of breath. Negative for chest tightness. Cardiovascular: Positive for chest pain. Gastrointestinal: Negative for abdominal pain, diarrhea, nausea and vomiting. Genitourinary: Negative for dysuria and flank pain. Musculoskeletal: Positive for myalgias. Skin: Negative for rash and wound. Neurological: Negative for syncope, numbness and headaches. Positives and Pertinent negatives as per HPI. Except as noted above in the ROS, all other systems were reviewed and negative.        PAST MEDICAL HISTORY     Past Medical History:   Diagnosis Date    Abnormal heart rhythm     irregular heart rate    Abnormal Pap smear of cervix 2015    LEEP    H/o Molar pregnancy     Hypertension     Hypoglycemia     Mental disorder     on meds in high school for bipolar, not currently taking medications    Uterus bicornis affecting pregnancy          SURGICAL HISTORY     Past Surgical History:   Procedure Laterality Date    APPENDECTOMY  2015    CHOLECYSTECTOMY  2016    DILATION AND CURETTAGE OF UTERUS  2011    LEEP      TONSILLECTOMY  11/29/2016         CURRENTMEDICATIONS Previous Medications    LISINOPRIL (PRINIVIL;ZESTRIL) 10 MG TABLET    Take 10 mg by mouth daily         ALLERGIES     Latex    FAMILYHISTORY       Family History   Problem Relation Age of Onset    Diabetes Mother           SOCIAL HISTORY       Social History     Tobacco Use    Smoking status: Former Smoker     Packs/day: 0.50     Types: Cigarettes     Quit date: 10/24/2016     Years since quittin.7    Smokeless tobacco: Former User     Quit date: 10/29/2016   Vaping Use    Vaping Use: Never used   Substance Use Topics    Alcohol use: No    Drug use: Not Currently     Comment: 2016 ED visit with accidental overdose of heroin. Per records patient  has used heroin recreationally. Pt currently denies drug use. SCREENINGS             PHYSICAL EXAM    (up to 7 for level 4, 8 or more for level 5)     ED Triage Vitals   BP Temp Temp src Pulse Resp SpO2 Height Weight   -- -- -- -- -- -- -- --       Physical Exam  Vitals and nursing note reviewed. Constitutional:       General: She is awake. She is not in acute distress. Appearance: She is well-developed. She is not ill-appearing, toxic-appearing or diaphoretic. Comments: Resting comfortably in the examination room in no evidence of acute painful distress. HENT:      Head: Normocephalic and atraumatic. Right Ear: External ear normal.      Left Ear: External ear normal.   Eyes:      General: No scleral icterus. Right eye: No discharge. Left eye: No discharge. Conjunctiva/sclera: Conjunctivae normal.   Neck:      Vascular: No JVD. Cardiovascular:      Rate and Rhythm: Normal rate and regular rhythm. Heart sounds: No murmur heard. No friction rub. No gallop. Comments: Bilateral calves supple. Negative Homans bilaterally. Pulmonary:      Effort: Pulmonary effort is normal. No accessory muscle usage or respiratory distress. Breath sounds: Normal breath sounds. No wheezing, rhonchi or rales. Chest:      Comments: No areas of tenderness noted over the midsternal region or left chest wall. Musculoskeletal:      Cervical back: Normal range of motion. Right lower leg: No edema. Left lower leg: No edema. Skin:     General: Skin is warm and dry. Neurological:      Mental Status: She is alert and oriented to person, place, and time. GCS: GCS eye subscore is 4. GCS verbal subscore is 5. GCS motor subscore is 6. Cranial Nerves: No cranial nerve deficit. Sensory: No sensory deficit. Coordination: Coordination normal.   Psychiatric:         Behavior: Behavior normal. Behavior is cooperative.          DIAGNOSTIC RESULTS   LABS:    Labs Reviewed   CBC WITH AUTO DIFFERENTIAL - Abnormal; Notable for the following components:       Result Value    WBC 11.8 (*)     Neutrophils Absolute 8.8 (*)     All other components within normal limits    Narrative:     Performed at:  [facility] [address], [place], [address]   Phone [phone]   COMPREHENSIVE METABOLIC PANEL W/ REFLEX TO MG FOR LOW K - Abnormal; Notable for the following components:    Glucose 113 (*)     CREATININE <0.5 (*)     ALT 81 (*)     AST 39 (*)     All other components within normal limits    Narrative:     Performed at:  [facility] [address], [place], [address]   Phone [phone]   D-DIMER, QUANTITATIVE - Abnormal; Notable for the following components:    D-Dimer, Quant 239 (*)     All other components within normal limits    Narrative:     Performed at:  [facility] [address], [place], [address]   Phone [phone]   BLOOD GAS, VENOUS - Abnormal; Notable for the following components:    pH, Venkatesh 7.502 (*)     pCO2, Venkatesh 27.5 (*)     pO2, Venkatesh 185.0 (*)     HCO3, Venous 21.5 (*)     Carboxyhemoglobin 2.8 (*)     All other components within normal limits    Narrative:     Performed RIGHT UPPER QUADRANT ULTRASOUND 8/2/2021 7:26 pm COMPARISON: None. HISTORY: ORDERING SYSTEM PROVIDED HISTORY: Elevated liver enzymes FINDINGS: LIVER:  Liver is diffusely increased in echogenicity without focal lesion or intrahepatic biliary ductal dilation. BILIARY SYSTEM:  Status post cholecystectomy Common bile duct is within normal limits measuring 5.4 mm. RIGHT KIDNEY: The right kidney is grossly unremarkable without evidence of hydronephrosis on limited imaging. PANCREAS:  Obscured by bowel gas OTHER: No evidence of right upper quadrant ascites. Status post cholecystectomy Increased echogenicity of the liver compatible with diffuse hepatocellular disease, hepatic steatosis. PROCEDURES   Unless otherwise noted below, none     Procedures    CRITICAL CARE TIME   N/A    CONSULTS:  None      EMERGENCY DEPARTMENT COURSE and DIFFERENTIAL DIAGNOSIS/MDM:   Vitals:    Vitals:    08/04/21 1200   BP: (!) 145/82   Pulse: 79   Resp: 18   Temp: 98.6 °F (37 °C)   SpO2: 99%   Weight: 253 lb 4.8 oz (114.9 kg)   Height: 5' 1\" (1.549 m)       Patient was given the following medications:  Medications   aspirin chewable tablet 324 mg (324 mg Oral Given 8/4/21 1203)   iopamidol (ISOVUE-370) 76 % injection 75 mL (75 mLs Intravenous Given 8/4/21 1418)           The patient's detailed history of present illness is documented as above. Upon arrival to the emergency department the patient's vital signs are as documented. The patient is noted to be hemodynamically stable and afebrile. Physical examination findings are as above. Laboratory testing work-up was initiated. EKG performed upon arrival demonstrates a sinus rhythm with a rate of 84. Normal axis and interval.  No evidence of acute ST elevation. No ST changes noted. Comparison EKG from August 13, 2019 has been reviewed. At that point she had a sinus arrhythmia with associated PVCs. Tracing today actually improved from 2019.   CBC demonstrates mild leukocytosis with a white count 11.8. No evidence of anemia and/or thrombocytopenia. BUN is 10 creatinine is less than 0.5 nonfasting glucose 113 electrolytes unremarkable. Mild transaminitis 81 and 39 respectively without bilirubinemia or elevation or alkaline phosphatase. Coags unremarkable. Initial troponin is less than 0.01. BNP is 56. Unfortunately the patient's D-dimer is a positive predictive value of 239 and she will require imaging. Venous blood gas demonstrates a pH of 7.50. PCO2 is low at 25 PO2 without hypoxia. Rapid Covid is negative. CT PE study demonstrates no evidence of acute cardiopulmonary process and no evidence of pulmonary embolism. Her troponin completed here in the emergency department is noted to be normal as well. With that being said I do believe the patient can be managed on an outpatient basis in regards to her chest pain. She has a follow-up with her cardiologist at the end of the month. Strict potential instructions for return. The patient has been made aware of the signs and symptoms which would necessitate an immediate return to the emergency department and verbalizes an understanding of these signs and symptoms. The patients clinical picture is most consistent with a non-cardiac etiology. Multiple potential etiologies were considered. No clinical evidence at this time to suggest acute coronary syndrome, pulmonary embolism, aortic dissection, aortic aneurysm, myocarditis, pneumonia, pneumothorax or pericardial tamponade or pericarditis. I feel the patient can be safely discharged to home with outpatient follow up. I discussed this plan with the patient, who verbalized understanding and agreement with the plan. Instructions have been given for the patient to return to the ED for any worsening of the symptoms, including but not limited to increased pain, shortness of breath, abdominal pain or weakness.  Patient seemed to understand the need for close follow-up and agreed to comply. FINAL IMPRESSION      1. Chest pain, unspecified type    2. Anxiety about health    3. History of radiofrequency ablation (RFA) procedure for cardiac arrhythmia    4. Lab test negative for COVID-19 virus          DISPOSITION/PLAN   DISPOSITION Decision To Discharge 08/04/2021 03:28:00 PM      PATIENT REFERRED TO:  NOEMI Patrick CNP  [address]  [phone]            Keep your appointment with the cardiologist this month.         [facility]  [address]  [phone]    If symptoms worsen      DISCHARGE MEDICATIONS:  New Prescriptions    No medications on file       DISCONTINUED MEDICATIONS:  Discontinued Medications    BUTALBITAL-APAP-CAFFEINE (FIORICET) -40 MG CAPS PER CAPSULE    Take 1 capsule by mouth every 4 hours as needed for Headaches    FAMOTIDINE (PEPCID) 20 MG TABLET    Take 1 tablet by mouth 2 times daily for 14 days    NAPROXEN (NAPROSYN) 500 MG TABLET    Take 1 tablet by mouth 2 times daily for 20 doses              (Please note that portions of this note were completed with a voice recognition program.  Efforts were made to edit the dictations but occasionally words are mis-transcribed.)    Jessica Johnson PA-C (electronically signed)           Doris Astorga PA-C  08/04/21 2187

## 2021-08-04 NOTE — ED PROVIDER NOTES
This is an independent TENZIN patient encounter. I am available for consultation if needed. I did not perform a face-to-face evaluation of this patient and was not asked to see the patient. I was not made aware of any details of the patient's H&P or medical decision making but was asked to review and document this EKG. See my interpretation of the EKG below. I shared my findings and interpretation with the TENZIN for use in his/her independent management of this patient. See his/her note for details of the patient's history, physical, and all medical decision making.       The 12 lead EKG was interpreted by me as follows:  Rate: normal with a rate of 84  Rhythm: sinus  Axis: normal  Intervals: normal LA, narrow QRS, normal QTc  ST segments: no ST elevations or depressions  T waves: no abnormal inversions  Non-specific T wave changes: not present  Prior EKG comparison: EKG dated 8/13/19 is not significantly different          Anne Marie Montaño MD  08/04/21 2610

## 2021-08-23 ENCOUNTER — TELEPHONE (OUTPATIENT)
Dept: BARIATRICS/WEIGHT MGMT | Age: 31
End: 2021-08-23

## 2021-08-23 NOTE — TELEPHONE ENCOUNTER
Patient was sent Dr. Eulalia Fine digital bariatric seminar. Patient DOES have BWLS coverage with [facility] (6mo consecutive diet) Bariatric benefit form scanned in media. LVM for pt.

## 2021-09-17 ENCOUNTER — OFFICE VISIT (OUTPATIENT)
Dept: BARIATRICS/WEIGHT MGMT | Age: 31
End: 2021-09-17
Payer: MEDICAID

## 2021-09-17 VITALS
HEIGHT: 61 IN | HEART RATE: 68 BPM | SYSTOLIC BLOOD PRESSURE: 125 MMHG | DIASTOLIC BLOOD PRESSURE: 77 MMHG | BODY MASS INDEX: 47.5 KG/M2 | WEIGHT: 251.6 LBS | OXYGEN SATURATION: 98 % | RESPIRATION RATE: 18 BRPM

## 2021-09-17 DIAGNOSIS — E66.01 MORBID OBESITY WITH BMI OF 45.0-49.9, ADULT (HCC): Primary | ICD-10-CM

## 2021-09-17 DIAGNOSIS — Z01.818 PREOPERATIVE CLEARANCE: ICD-10-CM

## 2021-09-17 DIAGNOSIS — I10 ESSENTIAL HYPERTENSION: ICD-10-CM

## 2021-09-17 DIAGNOSIS — K21.9 CHRONIC GERD: ICD-10-CM

## 2021-09-17 PROBLEM — O36.4XX0 FETAL DEMISE, GREATER THAN 22 WEEKS, ANTEPARTUM, SINGLE GESTATION: Status: RESOLVED | Noted: 2017-06-28 | Resolved: 2021-09-17

## 2021-09-17 PROBLEM — O47.02 THREATENED PREMATURE LABOR AFFECTING PREGNANCY, LESS THAN 37 WEEKS IN SECOND TRIMESTER, ANTEPARTUM: Status: RESOLVED | Noted: 2018-09-04 | Resolved: 2021-09-17

## 2021-09-17 PROBLEM — O09.A0 H/O MOLAR PREGNANCY, ANTEPARTUM: Status: RESOLVED | Noted: 2017-06-13 | Resolved: 2021-09-17

## 2021-09-17 PROCEDURE — 99205 OFFICE O/P NEW HI 60 MIN: CPT | Performed by: SURGERY

## 2021-09-17 PROCEDURE — G8417 CALC BMI ABV UP PARAM F/U: HCPCS | Performed by: SURGERY

## 2021-09-17 PROCEDURE — G8427 DOCREV CUR MEDS BY ELIG CLIN: HCPCS | Performed by: SURGERY

## 2021-09-17 PROCEDURE — 1036F TOBACCO NON-USER: CPT | Performed by: SURGERY

## 2021-09-17 RX ORDER — METOPROLOL TARTRATE 100 MG/1
TABLET ORAL 2 TIMES DAILY
Status: ON HOLD | COMMUNITY
End: 2021-10-22

## 2021-09-17 NOTE — PATIENT INSTRUCTIONS
-Plan for Laparoscopic sleeve gastrectomy      Pre-operative work up Ordered:    - [employer]. - Psych Evaluation.   - Cardiac Clearance. - EGD (endoscopy to check your stomach). - Support Group Attendance. - Obtain letter of medical necessity (PCP Letter). - Quit Smoking,  Alcohol, Caffeine and Carbonated Drinks  - Obtain records for Weight History 2 yrs. - Start Regular Exercise and track your activities. - Start Tracking your food Intake and follow dietary guidelines. - Avoid Pregnancy for 2 yrs from date of surgery. (for female patients in childbearing age)  - F/U in 4 weeks. Patient advised that its their responsibility to follow up for studies, referrals and/or labs ordered today.

## 2021-09-17 NOTE — PROGRESS NOTES
Darius Slaughter is a 32 y.o. female with a date of birth of 1990. Vitals:    09/17/21 0807   BP: 125/77   Pulse: 68   Resp: 18   SpO2: 98%    BMI: Body mass index is 47.54 kg/m². Obesity Classification: Class III    Weight History: Wt Readings from Last 3 Encounters:   09/17/21 251 lb 9.6 oz (114.1 kg)   08/04/21 253 lb 4.8 oz (114.9 kg)   02/21/20 288 lb (130.6 kg)     Patient's lowest adult weight was 151 lbs at age 19's. Patient's highest adult weight was 288 lbs at age 27. Patient has participated in the following weight loss programs: low carb, avoid carbonation/soda, OTC supplements and vegetarian diet pattern. Patient has participated in meal replacement/liquid diets. Patient has not participated in weight loss medications. Patient is  lactose intolerant for all dairy. Patient does not have Adventism/cultural food concerns. Patient does have food allergies for strawberries. Patient does tolerate artificial sweeteners. 24 hour recall/food frequency chart: Wake up 7 AM  Breakfast: None - pop  Lunch: 12 PM: leftovers - spaghetti or chinese  Snack: None  Dinner: 6-7 PM: 2 Corn dog + mac'n'cheese + baked beans  Snack: None OR pie OR cookies OR chips w/ lime and hot sauce  Drinks throughout the day: 4-5 cans coke/day, 1 can carbonated water, sweet tea, strong coffee w/ creamer  Do you drink alcohol? No.     Patient does meet the criteria for binge eating disorder. Patient does have grazing. Patient does not have night eating. Patient does not have a history of emotional eating or eating out of boredom. Surgery  Patient mostly feel confident in her ability to make these changes. The patient's expectations of post-surgical eating habits are realistic. Patient states she does understand the consequences of not complying with post-op food guidelines.   Patient states she does understands the long term changes in food intake that will be necessary for all occasions after surgery for the rest of her life. Patient is deemed nutritionally appropriate to proceed. Goals  Weight: under 200-180 lbs  Health Improvement: more energy, feel like herself    Assessment  Nutritional Needs: RMR=(9.99 x 114.1) + (6.25 x 154.9) - (4.92 x 31 y.o.) -161= 1794 kcal x 1.4 (sedentary activity factor)= 2512 kcal - 1000 (for 2 lb weight loss/week)= 1512 kcal.    Plan  Plan/Recommendations: Start presurgical guidelines. Goals:   -Eat 4-5 times daily  -Avoid high fat and high sugar foods  -Include protein with all meals and snacks  -Avoid carbonation and caffeine  -Avoid calorie containing beverages  -Increase physical activity as tolerated    PES Statement:  Overweight/Obesity related to lack of exercise, sedentary lifestyle, unhealthy eating habits, and unsuccessful diet attempts as evidenced by BMI. Body mass index is 47.54 kg/m². Will follow up as necessary.     Dina Lam RD, TRELL

## 2021-09-17 NOTE — PROGRESS NOTES
[facility]) Physicians   [employer]  Queenie Monte MD, [address] [facility], [address] [place] [address]    [address] [id number] . Phone: [phone]  Fax: [phone]       Chief Complaint   Patient presents with    Bariatric, Initial Visit     NP Marcell Gordon           HPI:    Praneeth Dorado is a very pleasant 32 y.o. obese female ,   Body mass index is 47.54 kg/m². And multiple medical problems who is presenting for weight loss surgery evaluation and consultation by Dr. Abimael Harrison. Patient has been struggling for several years now with obesity. Patient feels the weight is an obstacle to achieve and perform things in daily living as well risk on health. Tries to diet, and exercise but can't keep the weight off. Patient tried carb, avoid carbonation/soda, OTC supplements and vegetarian diet pattern. Patient has participated in meal replacement/liquid diets. Patient has not participated in weight loss medications and other regimens, but with no sustainable weight loss. Patient  is very determined to lose weight and be healthy, and is interested in surgical weight loss for future weight loss. .    Otherwise patient denies any nausea, vomiting, fevers, chills, shortness of breath, chest pain, constipation or urinary symptoms.         Obesity related problems Surem is dealing with:  Patient Active Problem List   Diagnosis    History of gestational diabetes mellitus (GDM) in prior pregnancy, currently pregnant in second trimester    Obesity in pregnancy    Screening, , for malformation by ultrasound    Preoperative clearance    Morbid obesity with BMI of 45.0-49.9, adult ([address]    Essential hypertension    Chronic GERD           Pain Assessment   Denies any abdominal pain     Past Medical History:   Diagnosis Date    Abnormal heart rhythm     irregular heart rate    Abnormal Pap smear of cervix 2015    LEEP    H/o Molar pregnancy     Hypertension     Hypoglycemia  Mental disorder     on meds in high school for bipolar, not currently taking medications    Uterus bicornis affecting pregnancy      Past Surgical History:   Procedure Laterality Date    APPENDECTOMY  2015    CHOLECYSTECTOMY  2016    DILATION AND CURETTAGE OF UTERUS  2011    LEEP      TONSILLECTOMY  2016     Family History   Problem Relation Age of Onset    Diabetes Mother      Social History     Tobacco Use    Smoking status: Former Smoker     Packs/day: 0.50     Types: Cigarettes     Quit date: 10/24/2016     Years since quittin.9    Smokeless tobacco: Former User     Quit date: 10/29/2016   Substance Use Topics    Alcohol use: No         I counseled the patient on the risks of Smoking, ETOH or Drug use, and importance of completely avoiding them, otherwise patient risks surgery cancellation or post operative serious complications if they start using any. Israel Penaloza acknowledged, agreed not to use and wants to proceed. Allergies   Allergen Reactions    Latex Rash     Vitals:    21 0807   BP: 125/77   Pulse: 68   Resp: 18   SpO2: 98%   Weight: 251 lb 9.6 oz (114.1 kg)   Height: 5' 1\" (1.549 m)       Body mass index is 47.54 kg/m². Current Outpatient Medications:     metoprolol (LOPRESSOR) 100 MG tablet, Take by mouth 2 times daily, Disp: , Rfl:       Review of Systems - History obtained from the patient  General ROS: overweight   Psychological ROS: negative  Ophthalmic ROS: negative  Neurological ROS: negative  ENT ROS: negative  Allergy and Immunology ROS: negative  Hematological and Lymphatic ROS: negative  Endocrine ROS: overweight  Breast ROS: negative  Respiratory ROS: negative  Cardiovascular ROS: negative  Gastrointestinal ROS:negative  Genito-Urinary ROS: negative  Musculoskeletal ROS: weight effects on joints  Skin ROS: negative    Physical Exam   Constitutional: Patient is oriented to person, place, and time.  Vital signs are normal. Patient  appears well-developed and well-nourished. Patient  is active and cooperative. Non-toxic appearance. No distress. HENT:   Head: Normocephalic and atraumatic. Head is without laceration. Right Ear: External ear normal. No lacerations. No drainage, swelling or tenderness. Left Ear: External ear normal. No lacerations. No drainage, swelling or tenderness. Nose/Mouth/Throat: Patient is wearing mask due to Covid-19 pandemic precautions, following CDC and health authorities guidelines. Eyes: Conjunctivae, EOM and lids are normal. Pupils are equal, round, and reactive to light. Right eye exhibits no discharge. No foreign body present in the right eye. Left eye exhibits no discharge. No foreign body present in the left eye. No scleral icterus. Neck: Trachea normal and normal range of motion. Neck supple. No JVD present. No tracheal tenderness present. Carotid bruit is not present. No rigidity. No tracheal deviation and no edema present. No thyromegaly present. Cardiovascular: Normal rate, regular rhythm, normal heart sounds, intact distal pulses and normal pulses. Pulmonary/Chest: Effort normal and breath sounds normal. No stridor. No respiratory distress. Patient  has no wheezes. Patient has no rales. Patient exhibits no tenderness and no crepitus. Abdominal: Soft. Normal appearance and bowel sounds are normal. Patient exhibits no distension, no abdominal bruit, no ascites and no mass. There is no hepatosplenomegaly. There is no tenderness. There is no rigidity, no rebound, no guarding and no CVA tenderness. No hernia. Hernia confirmed negative in the ventral area. Musculoskeletal: Normal range of motion. Patient exhibits no edema or tenderness. Lymphadenopathy:        Head (right side): No submental, no submandibular, no preauricular, no posterior auricular and no occipital adenopathy present.         Head (left side): No submental, no submandibular, no preauricular, no posterior auricular and no occipital Vitamin B12 & Folate; Future  -     Vitamin D 25 Hydroxy; Future  -     Vitamin E; Future  -     Protime-INR; Future  -     Ambulatory referral to Cardiology    Chronic GERD  -     CBC Auto Differential; Future  -     Comprehensive Metabolic Panel; Future  -     Hemoglobin A1C; Future  -     Iron and TIBC; Future  -     Lipid Panel; Future  -     TSH with Reflex; Future  -     Vitamin A; Future  -     Vitamin B1, Whole Blood; Future  -     Vitamin B12 & Folate; Future  -     Vitamin D 25 Hydroxy; Future  -     Vitamin E; Future  -     Protime-INR; Future  -     Ambulatory referral to Cardiology          A/P  Israel Penaloza is a very pleasant 32 y.o. female with Obesity,  Body mass index is 47.54 kg/m². and multiple obesity related co-morbidities. Israel Penaloza is very motivated to lose weight and being more healthy. We discussed how her weight affects her overall health including:  Patient Active Problem List   Diagnosis    History of gestational diabetes mellitus (GDM) in prior pregnancy, currently pregnant in second trimester    Obesity in pregnancy    Screening, , for malformation by ultrasound    Preoperative clearance    Morbid obesity with BMI of 45.0-49.9, adult ([address]    Essential hypertension    Chronic GERD          The patient underwent extensive dietary counseling with the registered dietitian. I have reviewed, discussed and agree with the dietary plan. Medical weight loss and different surgical options were discussed in details with patient. Israel is interested in surgical weight loss for future weight loss. Patient is interested in Laparoscopic Sleeve Gastrectomy, which I believe is an excellent option. I explained to the patient that surgery does carry a risk specially with the coexisting comorbid conditions the patient have. Surgery as well in obese patiens can carry more risk. Risks including but not limited to;  Infection, bleeding, gastric leak or obstruction, persistent nausea, vomiting, or reflux, injury to surrounding structures, risks of anesthesia, stricture, delayed gastric emptying, staple line leak, incisional hernia, malnutrition , hair loss, and/ or Conversion to Open surgery may be necessary. Failure to lose or maintain weight loss, Gallstones or Kidney Stones, Deep Venous Thrombosis , pulmonary embolism and / or death. However I do believe the benefits outweighs that risk. Carlos Enrique Diggs understands the risks and wants to proceed. We will proceed with pre-operative work up labs and studies. Will also petition patient's  insurance for approval for this procedure. I advised the patient that we can't guarantee final insurance approval.    Patient received dietary handouts and education. Patient advised that its their responsibility to follow up for studies, referrals and/or labs ordered today. Also discussed in details the importance of follow up, as well following the recommendations and completing the whole program to improve outcomes when it comes to healthier lifestyle as well weight loss. Patient also advised about risks and benefits being on a strict dietary regimen as well using supplements. Patient agrees and wants to proceed with weight loss planning     Today's encounter included any number of the following: Bariatric Pre/Post operative work up/protocols, review of labs, imaging, provider notes, outside hospital records, performing examination/evaluation, counseling patient and/or family, ordering medications/tests, placing referrals and communication with referring physicians, coordination of care; discussing dietary plan/recall with the patient as well with registered dietitian and documentation in the EHR. Of note, the above was done during same day of the actual patient encounter. Obesity as a disease is considered a high risk to patients overall health and should therefore be considered a high risk disease state.    Advised the patient that not getting there weight under control (weight loss hopefully will help with resolving/improving of the comorbid conditions),  that could increase risk of complications/worsening of those conditions on the long-term. Now with Covid-19 pandemic, CDC and health authorities does classify obese patients as vulnerable and high risk as well. Which makes weight loss a priority for improvement of their wellbeing and overall health. [facility] has issued the following statement as far Obese patients being at Increased Risk of being critically ill from SARS-Cov-2  \"Severe obesity increases the risk of a serious breathing problem called acute respiratory distress syndrome (ARDS), which is a major complication of WGBPP-53 and can cause difficulties with a doctors ability to provide respiratory support for seriously ill patients. People living with severe obesity can have multiple serious chronic diseases and underlying health conditions that can increase the risk of severe illness from COVID-19. \"       Patient Instructions     -Plan for Laparoscopic sleeve gastrectomy      Pre-operative work up Ordered:    - Nutrition Labs. - Psych Evaluation.   - Cardiac Clearance. - EGD (endoscopy to check your stomach). - Support Group Attendance. - Obtain letter of medical necessity (PCP Letter). - Quit Smoking,  Alcohol, Caffeine and Carbonated Drinks  - Obtain records for Weight History 2 yrs. - Start Regular Exercise and track your activities. - Start Tracking your food Intake and follow dietary guidelines. - Avoid Pregnancy for 2 yrs from date of surgery. (for female patients in childbearing age)  - F/U in 4 weeks. Patient advised that its their responsibility to follow up for studies, referrals and/or labs ordered today. Please note that some or all of this report was generated using voice recognition software.  Please notify me in case of any questions about the content of this document, as some errors in transcription may have occurred .

## 2021-10-12 ENCOUNTER — HOSPITAL ENCOUNTER (EMERGENCY)
Age: 31
Discharge: LEFT BEFORE DISCHARGE | End: 2021-10-12
Payer: MEDICAID

## 2021-10-17 PROBLEM — Z01.818 PREOPERATIVE CLEARANCE: Status: RESOLVED | Noted: 2021-09-17 | Resolved: 2021-10-17

## 2021-10-19 NOTE — PROGRESS NOTES
the first 24 hrs. Your surgery will be cancelled if you do not have a ride home. 8. A parent/legal guardian must accompany a child scheduled for surgery and plan to stay at the hospital until the child is discharged. Please do not bring other children with you. 9. Please wear simple, loose fitting clothing to the hospital.  [employer] not bring valuables (money, credit cards, checkbooks, etc.) Do not wear any makeup (including no eye makeup) or nail polish on your fingers or toes. 10. DO NOT wear any jewelry or piercings on day of surgery. All body piercing jewelry must be removed. 11. If you have ___dentures, they will be removed before going to the OR; we will provide you a container. If you wear ___contact lenses or ___glasses, they will be removed; please bring a case for them. 12. Please see your family doctor/pediatrician for a history & physical and/or concerning medications. Bring any test results/reports from your physician's office. PCP__________________Phone___________H&P Appt. Date________             13 If you  have a Living Will and Durable Power of  for Healthcare, please bring in a copy. 15. Notify your Surgeon if you develop any illness between now and surgery  time, cough, cold, fever, sore throat, nausea, vomiting, etc.  Please notify your surgeon if you experience dizziness, shortness of breath or blurred vision between now & the time of your surgery             15. DO NOT shave your operative site 96 hours prior to surgery. For face & neck surgery, men may use an electric razor 48 hours prior to surgery. 16. Shower the night before or morning of surgery using an antibacterial soap or as you have been instructed. 17. To provide excellent care visitors will be limited to one in the room at any given time. 18.  Please bring picture ID and insurance card.              19.  Visit our web site for additional information:  [email or web address]              20.During flu season no children under the age of 15 are permitted in the hospital for the safety of all patients. 21. If you take a long acting insulin in the evening only  take half of your usual  dose the night  before your procedure              22. If you use a c-pap please bring DOS if staying overnight,             23.For your convenience [facility] has a pharmacy on site to fill your prescriptions. 24. If you use oxygen and have a portable tank please bring it  with you the DOS             25. Bring a complete list of all your medications with name and dose include any supplements. 26. Other__________________________________________   *Please call pre admission testing if you any further questions   Atul Jasmine   [address]    [place] [phone] [place]  [phone]   [facility] [address] MIGUELINA BECK   [phone]           COVID TESTING    __x_ Covid test to be done 3-5 days prior to scheduled surgery -patient aware they are REQUIRED to bring a copy of the negative result DOS-if they receive a positive result to notify their surgeon         If known - indicate where patient is getting covid test done _patient to get 10/19 at an urgent care and knows to bring results DOS__________________________________________________________    ___ Rapid - DOS    ___ Other___________________________________      [facility](subject to change[facility]    There is a one visitor policy at [facility] for all surgeries and endoscopies. Whether the visitor can stay or will be asked to wait in the car will depend on the current policy and if social distancing can be maintained. The policy is subject to change at any time. Please make sure the visitor has a cell phone that is on,charged and able to accept calls, as this may be the way that the staff communicates with them. Pain management is NO VISITOR policyThe patients ride is expected to remain in the car with a cell phone for communication. If the ride is leaving the hospital grounds please make sure they are back in time for pickup. Have the patient inform the staff on arrival what their rides plans are while the patient is in the facility. At the MAIN there is one visitor allowed. Please note that the visitor policy is subject to change. All above information reviewed with patient in person or by phone. Patient verbalizes understanding. All questions and concerns addressed.                                                                                                  Patient/Rep__per phone/pt__________________                                                                                                                                    PRE OP INSTRUCTIONS

## 2021-10-20 DIAGNOSIS — Z01.818 PREOPERATIVE CLEARANCE: Primary | ICD-10-CM

## 2021-10-22 ENCOUNTER — ANESTHESIA (OUTPATIENT)
Dept: ENDOSCOPY | Age: 31
End: 2021-10-22
Payer: MEDICAID

## 2021-10-22 ENCOUNTER — ANESTHESIA EVENT (OUTPATIENT)
Dept: ENDOSCOPY | Age: 31
End: 2021-10-22
Payer: MEDICAID

## 2021-10-22 ENCOUNTER — HOSPITAL ENCOUNTER (OUTPATIENT)
Age: 31
Setting detail: OUTPATIENT SURGERY
Discharge: HOME | End: 2021-10-22
Attending: SURGERY | Admitting: SURGERY
Payer: MEDICAID

## 2021-10-22 VITALS
TEMPERATURE: 97 F | DIASTOLIC BLOOD PRESSURE: 69 MMHG | BODY MASS INDEX: 43.02 KG/M2 | HEIGHT: 64 IN | SYSTOLIC BLOOD PRESSURE: 117 MMHG | RESPIRATION RATE: 16 BRPM | HEART RATE: 71 BPM | OXYGEN SATURATION: 99 % | WEIGHT: 252 LBS

## 2021-10-22 VITALS
RESPIRATION RATE: 1 BRPM | OXYGEN SATURATION: 100 % | DIASTOLIC BLOOD PRESSURE: 74 MMHG | SYSTOLIC BLOOD PRESSURE: 124 MMHG

## 2021-10-22 LAB — HCG(URINE) PREGNANCY TEST: NEGATIVE

## 2021-10-22 PROCEDURE — 6360000002 HC RX W HCPCS: Performed by: NURSE ANESTHETIST, CERTIFIED REGISTERED

## 2021-10-22 PROCEDURE — 43239 EGD BIOPSY SINGLE/MULTIPLE: CPT | Performed by: SURGERY

## 2021-10-22 PROCEDURE — 3700000000 HC ANESTHESIA ATTENDED CARE: Performed by: SURGERY

## 2021-10-22 PROCEDURE — 7100000010 HC PHASE II RECOVERY - FIRST 15 MIN: Performed by: SURGERY

## 2021-10-22 PROCEDURE — 88305 TISSUE EXAM BY PATHOLOGIST: CPT

## 2021-10-22 PROCEDURE — 2580000003 HC RX 258: Performed by: ANESTHESIOLOGY

## 2021-10-22 PROCEDURE — 3609012400 HC EGD TRANSORAL BIOPSY SINGLE/MULTIPLE: Performed by: SURGERY

## 2021-10-22 PROCEDURE — 2580000003 HC RX 258: Performed by: NURSE ANESTHETIST, CERTIFIED REGISTERED

## 2021-10-22 PROCEDURE — 84703 CHORIONIC GONADOTROPIN ASSAY: CPT

## 2021-10-22 PROCEDURE — 7100000011 HC PHASE II RECOVERY - ADDTL 15 MIN: Performed by: SURGERY

## 2021-10-22 PROCEDURE — 2709999900 HC NON-CHARGEABLE SUPPLY: Performed by: SURGERY

## 2021-10-22 PROCEDURE — 2500000003 HC RX 250 WO HCPCS: Performed by: NURSE ANESTHETIST, CERTIFIED REGISTERED

## 2021-10-22 RX ORDER — SODIUM CHLORIDE, SODIUM LACTATE, POTASSIUM CHLORIDE, CALCIUM CHLORIDE 600; 310; 30; 20 MG/100ML; MG/100ML; MG/100ML; MG/100ML
INJECTION, SOLUTION INTRAVENOUS CONTINUOUS PRN
Status: DISCONTINUED | OUTPATIENT
Start: 2021-10-22 | End: 2021-10-22 | Stop reason: SDUPTHER

## 2021-10-22 RX ORDER — OMEPRAZOLE 20 MG/1
20 CAPSULE, DELAYED RELEASE ORAL DAILY
Qty: 30 CAPSULE | Refills: 3 | Status: SHIPPED | OUTPATIENT
Start: 2021-10-22 | End: 2022-06-01 | Stop reason: ALTCHOICE

## 2021-10-22 RX ORDER — LIDOCAINE HYDROCHLORIDE 20 MG/ML
INJECTION, SOLUTION EPIDURAL; INFILTRATION; INTRACAUDAL; PERINEURAL PRN
Status: DISCONTINUED | OUTPATIENT
Start: 2021-10-22 | End: 2021-10-22 | Stop reason: SDUPTHER

## 2021-10-22 RX ORDER — SODIUM CHLORIDE 9 MG/ML
INJECTION, SOLUTION INTRAVENOUS CONTINUOUS
Status: DISCONTINUED | OUTPATIENT
Start: 2021-10-22 | End: 2021-10-22 | Stop reason: HOSPADM

## 2021-10-22 RX ORDER — PROPOFOL 10 MG/ML
INJECTION, EMULSION INTRAVENOUS PRN
Status: DISCONTINUED | OUTPATIENT
Start: 2021-10-22 | End: 2021-10-22 | Stop reason: SDUPTHER

## 2021-10-22 RX ADMIN — SODIUM CHLORIDE, POTASSIUM CHLORIDE, SODIUM LACTATE AND CALCIUM CHLORIDE: 600; 310; 30; 20 INJECTION, SOLUTION INTRAVENOUS at 08:37

## 2021-10-22 RX ADMIN — SODIUM CHLORIDE: 9 INJECTION, SOLUTION INTRAVENOUS at 07:24

## 2021-10-22 RX ADMIN — LIDOCAINE HYDROCHLORIDE 100 MG: 20 INJECTION, SOLUTION EPIDURAL; INFILTRATION; INTRACAUDAL; PERINEURAL at 08:38

## 2021-10-22 RX ADMIN — PROPOFOL 550 MG: 10 INJECTION, EMULSION INTRAVENOUS at 08:41

## 2021-10-22 RX ADMIN — PROPOFOL 100 MG: 10 INJECTION, EMULSION INTRAVENOUS at 08:38

## 2021-10-22 ASSESSMENT — PULMONARY FUNCTION TESTS
PIF_VALUE: 1
PIF_VALUE: 0
PIF_VALUE: 0
PIF_VALUE: 1

## 2021-10-22 ASSESSMENT — PAIN - FUNCTIONAL ASSESSMENT: PAIN_FUNCTIONAL_ASSESSMENT: 0-10

## 2021-10-22 ASSESSMENT — LIFESTYLE VARIABLES: SMOKING_STATUS: 0

## 2021-10-22 NOTE — PROGRESS NOTES
ANESTHESIA DISCHARGE INSTRUCTIONS    · Wear your seatbelt home. · You are under the influence of drugs-do not drink alcohol, drive, operate machinery, make any important decisions or sign any legal documents for 24 hours. · Children should not ride bikes, Divide or play on gym sets for 24 hours after surgery. · A responsible adult needs to be with you for 24 hours. · You may experience lightheadedness, dizziness, or sleepiness following surgery. · Rest at home today- increase activity as tolerated. · Progress slowly to a regular diet unless your physician has instructed you otherwise. Drink plenty of water. · If persistent nausea and vomiting becomes a problem, call your physician. · Coughing, sore throat and muscle aches are other side effects of anesthesia, and should improve with time. · Do not drive or operate machinery while taking narcotics. · Females of childbearing potential and on hormonal birth control, should use two forms of contraception following procedure if given a medication called sugammadex and/or emend. Additional contraception should be used for 7 days for sugammadex and/or 28 days for emend. These medications have a potential to reduce the effectiveness of hormonal birth control. ENDOSCOPY DISCHARGE INSTRUCTIONS    · You may experience some lightheadedness for the next several hours. · Plan on quiet relaxation for the rest of today. · A responsible adult needs to stay with you today. · Because of the medications you received today-do not drive,operate machinery,or sign any contractual agreement for the next 24 hours. · Do not drink any alcoholic beverages or take any unprescribed medications tonight. · Eat bland food and avoid anything greasy or spicy initially-progress to your normal diet gradually. · Diet restrictions as instructed. · You may resume home medications as instructed.   · It is not unusual to experience some mild cramping or gas pains, and you may not have a bowel movement for several days. · If you have any of the following problems, notify your physician or return to the hospital emergency room : fever, chills, excessive bleeding, excessive vomiting, difficulty swallowing, uncontrolled pain, increased abdominal distention, shortness of breath or any other problems. · If you had a polyp removed, avoid strenuous activity for 48 hours. Avoid the use of aspirin or related compounds for one week, unless otherwise instructed by your physician. You may notice a small amount of blood in your next few bowel movements, but if a large amount passes, call your physician. · If you have a sore throat, you may use lozenges or salt water gargles. · If you had a bronchoscopy and you experience sudden or continued shortness of breath, chest pain, spitting up or vomiting blood, notify your physician or return to the hospital emergency room.   ·

## 2021-10-22 NOTE — ANESTHESIA PRE PROCEDURE
Department of Anesthesiology  Preprocedure Note       Name:  Kayla Hope   Age:  32 y.o.  :  1990                                          MRN:  [id number]         Date:  10/22/2021      Surgeon: Maria Esther Candelaria): Madison Figueroa MD    Procedure: Procedure(s):  EGD ESOPHAGOGASTRODUODENOSCOPY    Medications prior to admission:   Prior to Admission medications    Medication Sig Start Date End Date Taking? Authorizing Provider   metoprolol (LOPRESSOR) 100 MG tablet Take by mouth 2 times daily   Yes Historical Provider, MD       Current medications:    No current facility-administered medications for this encounter. Allergies: Allergies   Allergen Reactions    Latex Rash       Problem List:    Patient Active Problem List   Diagnosis Code    History of gestational diabetes mellitus (GDM) in prior pregnancy, currently pregnant in second trimester O09.292, Z86.32    Obesity in pregnancy O99.210    Screening, , for malformation by ultrasound Z36.3    Morbid obesity with BMI of 45.0-49.9, adult ([facility] E66.01, Z68.42    Essential hypertension I10    Chronic GERD K21.9       Past Medical History:        Diagnosis Date    Abnormal heart rhythm     irregular heart rate    Abnormal Pap smear of cervix     LEEP    H/o Molar pregnancy     Hypertension     Hypoglycemia     Mental disorder     on meds in high school for bipolar, not currently taking medications    Uterus bicornis affecting pregnancy        Past Surgical History:        Procedure Laterality Date    APPENDECTOMY  2015    CHOLECYSTECTOMY  2016   [address] [facility] [address] OF UTERUS  2011    LEEP      TONSILLECTOMY  2016       Social History:    Social History     Tobacco Use    Smoking status: Former Smoker     Packs/day: 0.50     Types: Cigarettes     Quit date: 10/24/2016     Years since quittin.9    Smokeless tobacco: Former User     Quit date: 10/29/2016   Substance Use Topics    Alcohol use:  No Counseling given: Not Answered      Vital Signs (Current):   Vitals:    10/19/21 1308   Weight: 250 lb (113.4 kg)   Height: 5' 1\" (1.549 m)                                              BP Readings from Last 3 Encounters:   09/17/21 125/77   08/04/21 (!) 145/82   02/21/20 (!) 154/79       NPO Status:                                                                                 BMI:   Wt Readings from Last 3 Encounters:   10/19/21 250 lb (113.4 kg)   09/17/21 251 lb 9.6 oz (114.1 kg)   08/04/21 253 lb 4.8 oz (114.9 kg)     Body mass index is 47.24 kg/m². CBC:   Lab Results   Component Value Date    WBC 11.8 08/04/2021    RBC 4.23 08/04/2021    HGB 12.8 08/04/2021    HCT 37.4 08/04/2021    MCV 88.4 08/04/2021    RDW 13.4 08/04/2021     08/04/2021       CMP:   Lab Results   Component Value Date     08/04/2021    K 3.6 08/04/2021     08/04/2021    CO2 22 08/04/2021    BUN 10 08/04/2021    CREATININE <0.5 08/04/2021    GFRAA >60 08/04/2021    AGRATIO 1.1 08/04/2021    LABGLOM >60 08/04/2021    GLUCOSE 113 08/04/2021    PROT 7.5 08/04/2021    CALCIUM 9.3 08/04/2021    BILITOT 0.5 08/04/2021    ALKPHOS 95 08/04/2021    AST 39 08/04/2021    ALT 81 08/04/2021       POC Tests: No results for input(s): POCGLU, POCNA, POCK, POCCL, POCBUN, POCHEMO, POCHCT in the last 72 hours.     Coags:   Lab Results   Component Value Date    PROTIME 12.1 08/04/2021    INR 1.07 08/04/2021    APTT 31.1 02/21/2018       HCG (If Applicable):   Lab Results   Component Value Date    PREGTESTUR POSITIVE 12/31/2016    HCG NEGATIVE 11/29/2016    HCGQUANT <1 11/16/2016        ABGs: No results found for: PHART, PO2ART, ZEC3YGM, HQK3BJK, BEART, N5TTOXOM     Type & Screen (If Applicable):  No results found for: LABABO, LABRH    Drug/Infectious Status (If Applicable):  No results found for: HIV, HEPCAB    COVID-19 Screening (If Applicable):   Lab Results   Component Value Date    COVID19 Not Detected 08/04/2021 Anesthesia Evaluation  Patient summary reviewed and Nursing notes reviewed no history of anesthetic complications:   Airway: Mallampati: II  TM distance: >3 FB   Neck ROM: full  Mouth opening: > = 3 FB Dental: normal exam         Pulmonary:normal exam  breath sounds clear to auscultation      (-) COPD, asthma, sleep apnea and not a current smoker (former)                           Cardiovascular:    (+) hypertension:, dysrhythmias (h/o PVCs status post ablation 2018 on bb):,     (-) past MI, CAD (neg stress test 2019), CABG/stent,  angina and  CHF (echo 2019 EF 55-60)    ECG reviewed  Rhythm: regular  Rate: normal  Echocardiogram reviewed  Stress test reviewed       Beta Blocker:  Dose within 24 Hrs         Neuro/Psych:   Negative Neuro/Psych ROS     (-) seizures, TIA and CVA           GI/Hepatic/Renal:   (+) GERD: well controlled, morbid obesity     (-) liver disease and no renal disease       Endo/Other: Negative Endo/Other ROS       (-) diabetes mellitus, hypothyroidism, hyperthyroidism               Abdominal:             Vascular: Other Findings:           Anesthesia Plan      MAC     ASA 3       Induction: intravenous. Anesthetic plan and risks discussed with patient. Plan discussed with CRNA.                   Bunny Butler MD   10/22/2021

## 2021-10-22 NOTE — ANESTHESIA POSTPROCEDURE EVALUATION
Department of Anesthesiology  Postprocedure Note    Patient: Gladis Cain  MRN: [id number]  YOB: 1990  Date of evaluation: 10/22/2021  Time:  8:54 AM     Procedure Summary     Date: 10/22/21 Room / Location: [address] [place] [address]    Anesthesia Start: 5196 Anesthesia Stop: 5808    Procedure: EGD BIOPSY (N/A Abdomen) Diagnosis: (Gastroesophageal reflux disease (GERD) K21.9)    Surgeons: Renato Mahan MD Responsible Provider: Erica Alvarado MD    Anesthesia Type: MAC ASA Status: 3          Anesthesia Type: MAC    Maikol Phase I:      Maikol Phase II: Maikol Score: 10    Last vitals: Reviewed and per EMR flowsheets.        Anesthesia Post Evaluation    Patient location during evaluation: PACU  Patient participation: complete - patient participated  Level of consciousness: awake and alert  Airway patency: patent  Nausea & Vomiting: no vomiting and no nausea  Complications: no  Cardiovascular status: hemodynamically stable  Respiratory status: acceptable  Hydration status: stable

## 2021-10-22 NOTE — H&P
Department of [facility] [address] [facility]   [employer]  Attending Pre-operative History and Physical      DIAGNOSIS:  Obesity    INDICATION:  Pre-op    PROCEDURE:  EGD    CHIEF COMPLAINT:  Obesity    History Obtained From:  patient    HISTORY OF PRESENT ILLNESS:    The patient is a 32 y.o. female with significant past medical history of   Patient Active Problem List   Diagnosis    History of gestational diabetes mellitus (GDM) in prior pregnancy, currently pregnant in second trimester    Obesity in pregnancy    Screening, , for malformation by ultrasound    Morbid obesity with BMI of 45.0-49.9, adult ([address]    Essential hypertension    Chronic GERD      who presents for pre-op EGD    Past Medical History:        Diagnosis Date    Abnormal heart rhythm     irregular heart rate    Abnormal Pap smear of cervix 2015    LEEP    H/o Molar pregnancy     Hypertension     Hypoglycemia     Mental disorder     on meds in high school for bipolar, not currently taking medications    Uterus bicornis affecting pregnancy      Past Surgical History:        Procedure Laterality Date    APPENDECTOMY  2015    CHOLECYSTECTOMY  2016   [address] [facility] OF UTERUS  2011    LEEP      TONSILLECTOMY  2016     Medications Prior to Admission:   Medications Prior to Admission: metoprolol tartrate (LOPRESSOR) 25 MG tablet, Take 25 mg by mouth 2 times daily  [DISCONTINUED] metoprolol (LOPRESSOR) 100 MG tablet, Take by mouth 2 times daily    Allergies:  Latex    Social History:   TOBACCO:   reports that she quit smoking about 4 years ago. Her smoking use included cigarettes. She smoked 0.50 packs per day. She quit smokeless tobacco use about 4 years ago. ETOH:   reports no history of alcohol use.   Family History:       Problem Relation Age of Onset    Diabetes Mother          REVIEW OF SYSTEMS:    Review of Systems - History obtained from the patient  General ROS: negative  Psychological ROS: negative  Ophthalmic ROS: negative  Neurological ROS: negative  ENT ROS: negative  Allergy and Immunology ROS: negative  Hematological and Lymphatic ROS: negative  Endocrine ROS: negative  Breast ROS: negative  Respiratory ROS: negative  Cardiovascular ROS: negative  Gastrointestinal ROS:negative  Genito-Urinary ROS: negative  Musculoskeletal ROS: negative   Skin ROS: negative      PHYSICAL EXAM:      /79   Pulse 63   Temp 97.4 °F (36.3 °C) (Temporal)   Resp 16   Ht 5' 4\" (1.626 m)   Wt 252 lb (114.3 kg)   SpO2 100%   BMI 43.26 kg/m²  I      Physical Exam   Vitals Reviewed   Constitutional: Patient is oriented to person, place, and time. Patient appears well-developed and well-nourished. Patient is active and cooperative. Non-toxic appearance. No distress. HENT:   Head: Normocephalic and atraumatic. Head is without abrasion and without laceration. Hair is normal.   Right Ear: External ear normal. No lacerations. No drainage, swelling . Left Ear: External ear normal. No lacerations. No drainage, swelling. Nose: Nose normal. No nose lacerations or nasal deformity. Eyes: Conjunctivae, EOM and lids are normal. Right eye exhibits no discharge. No foreign body present in the right eye. Left eye exhibits no discharge. No foreign body present in the left eye. No scleral icterus. Neck: Trachea normal and normal range of motion. No JVD present. Pulmonary/Chest: Effort normal. No accessory muscle usage or stridor. No apnea. No respiratory distress. Cardiovascular: Normal rate and no JVD. Abdominal: Normal appearance. Patient exhibits no distension. Musculoskeletal: Normal range of motion. Patient exhibits no edema. Neurological: Patient is alert and oriented to person, place, and time. Patient has normal strength. GCS eye subscore is 4. GCS verbal subscore is 5. GCS motor subscore is 6. Skin: Skin is warm and dry.  No abrasion and no rash noted. Patient is not diaphoretic. No cyanosis or erythema. Psychiatric: Patient has a normal mood and affect. Speech is normal and behavior is normal. Cognition and memory are normal.       DATA:  CBC:   Lab Results   Component Value Date    WBC 11.8 08/04/2021    RBC 4.23 08/04/2021    HGB 12.8 08/04/2021    HCT 37.4 08/04/2021    MCV 88.4 08/04/2021    MCH 30.3 08/04/2021    MCHC 34.3 08/04/2021    RDW 13.4 08/04/2021     08/04/2021    MPV 9.1 08/04/2021     CMP:    Lab Results   Component Value Date     08/04/2021    K 3.6 08/04/2021     08/04/2021    CO2 22 08/04/2021    BUN 10 08/04/2021    CREATININE <0.5 08/04/2021    GFRAA >60 08/04/2021    AGRATIO 1.1 08/04/2021    LABGLOM >60 08/04/2021    GLUCOSE 113 08/04/2021    PROT 7.5 08/04/2021    LABALBU 4.0 08/04/2021    CALCIUM 9.3 08/04/2021    BILITOT 0.5 08/04/2021    ALKPHOS 95 08/04/2021    AST 39 08/04/2021    ALT 81 08/04/2021       ASSESSMENT AND PLAN:    1. Patient is a 32 y.o. female with above specified procedure planned EGD with deep sedation  2. Procedure options, risks and benefits reviewed with patient. Patient expresses understanding.

## 2021-11-02 ENCOUNTER — APPOINTMENT (OUTPATIENT)
Dept: GENERAL RADIOLOGY | Age: 31
End: 2021-11-02
Payer: MEDICAID

## 2021-11-02 ENCOUNTER — HOSPITAL ENCOUNTER (EMERGENCY)
Age: 31
Discharge: LEFT BEFORE DISCHARGE | End: 2021-11-02
Attending: EMERGENCY MEDICINE
Payer: MEDICAID

## 2021-11-02 VITALS
HEART RATE: 99 BPM | OXYGEN SATURATION: 99 % | DIASTOLIC BLOOD PRESSURE: 99 MMHG | RESPIRATION RATE: 18 BRPM | SYSTOLIC BLOOD PRESSURE: 154 MMHG

## 2021-11-02 DIAGNOSIS — F12.90 MARIJUANA USE: Primary | ICD-10-CM

## 2021-11-02 DIAGNOSIS — R11.0 NAUSEA: ICD-10-CM

## 2021-11-02 LAB
EKG ATRIAL RATE: 94 BPM
EKG DIAGNOSIS: NORMAL
EKG P AXIS: 53 DEGREES
EKG P-R INTERVAL: 168 MS
EKG Q-T INTERVAL: 354 MS
EKG QRS DURATION: 84 MS
EKG QTC CALCULATION (BAZETT): 442 MS
EKG R AXIS: 15 DEGREES
EKG T AXIS: 17 DEGREES
EKG VENTRICULAR RATE: 94 BPM

## 2021-11-02 PROCEDURE — 99284 EMERGENCY DEPT VISIT MOD MDM: CPT

## 2021-11-02 PROCEDURE — 93010 ELECTROCARDIOGRAM REPORT: CPT | Performed by: INTERNAL MEDICINE

## 2021-11-02 PROCEDURE — 71045 X-RAY EXAM CHEST 1 VIEW: CPT

## 2021-11-02 PROCEDURE — 93005 ELECTROCARDIOGRAM TRACING: CPT | Performed by: EMERGENCY MEDICINE

## 2021-11-02 RX ORDER — 0.9 % SODIUM CHLORIDE 0.9 %
1000 INTRAVENOUS SOLUTION INTRAVENOUS ONCE
Status: DISCONTINUED | OUTPATIENT
Start: 2021-11-02 | End: 2021-11-02 | Stop reason: HOSPADM

## 2021-11-02 RX ORDER — ONDANSETRON 2 MG/ML
4 INJECTION INTRAMUSCULAR; INTRAVENOUS ONCE
Status: DISCONTINUED | OUTPATIENT
Start: 2021-11-02 | End: 2021-11-02 | Stop reason: HOSPADM

## 2021-11-02 NOTE — ED NOTES
While inserting IV, pt began to yell asking this RN to remove the IV because it was hurting. Pt states she does not want the IV or the blood work. Dr Ana Gordon updated.         Robyn Dueñas RN  11/02/21 7543

## 2021-11-02 NOTE — ED PROVIDER NOTES
EMERGENCY DEPARTMENT PROVIDER NOTE    Patient Identification  Pt Name: Gladis Cain  MRN: [id number]  [place] 1990  Date of evaluation: 11/2/2021  Provider: Gissel Acevedo DO  PCP: NOEMI Reynoso CNP    Chief Complaint  Drug Overdose (Pt had edibles at home, Spouse called EMS because he felt she was altered. Pt answers all questions appropriately, alert and oriented to situation. States, \"My body just feels weird, is this how this feels from that stupid little chocolate? \". )      HPI  (History provided by patient)  This is a 32 y.o. female with pertinent past medical history of hypertension, previously treated for bipolar disorder who was brought in by EMS transportation for confusion reported by patient's spouse who called 911. Patient states he ate marijuana edibles around 1800 yesterday evening, she awoke and felt like she was confused, further describes this as \"everything feels like I am in a dream.\"  Associated with nausea but no vomiting. Nothing seems to make the symptoms any better or worse. Patient denies any painful complaints. Denies any fevers, chills or  symptoms. She denies any suicidal ideation.     ROS    Const:  No fevers, no chills, no generalized weakness  Skin:  No rash, no lesions  Eyes:  No visual changes, no blurry or double vision, no pain  ENT:  No sore throat, no difficulty swallowing, no ear pain, no sinus pain or congestion  Card:  No chest pain, no palpitations, no edema  Resp:  No shortness of breath, no cough, no wheezing  Abd:  No abdominal pain, +nausea, no vomiting, no diarrhea  Genitourinary:  No dysuria, no hematuria, no vaginal discharge, no vaginal bleeding  MSK:  No joint pain, no myalgia  Neuro:  No focal weakness, no headache, no paresthesia    All other systems reviewed and negative unless otherwise noted in HPI      I have reviewed the following nursing documentation:  Allergies: Latex    Past medical history:   Past Medical History: Diagnosis Date    Abnormal heart rhythm     irregular heart rate    Abnormal Pap smear of cervix 2015    LEEP    H/o Molar pregnancy     Hypertension     Hypoglycemia     Mental disorder     on meds in high school for bipolar, not currently taking medications    Uterus bicornis affecting pregnancy      Past surgical history:   Past Surgical History:   Procedure Laterality Date    APPENDECTOMY  2015    CHOLECYSTECTOMY  2016    DILATION AND CURETTAGE OF UTERUS  2011    LEEP      TONSILLECTOMY  11/29/2016    UPPER GASTROINTESTINAL ENDOSCOPY N/A 10/22/2021    EGD BIOPSY performed by Idalmis Bauer MD at [facility] medications:   Discharge Medication List as of 11/2/2021  6:08 AM      CONTINUE these medications which have NOT CHANGED    Details   metoprolol tartrate (LOPRESSOR) 25 MG tablet Take 25 mg by mouth 2 times dailyHistorical Med      omeprazole (PRILOSEC) 20 MG delayed release capsule Take 1 capsule by mouth Daily, Disp-30 capsule, R-3Normal             Social history:  reports that she quit smoking about 5 years ago. Her smoking use included cigarettes. She smoked 0.50 packs per day. She quit smokeless tobacco use about 5 years ago. She reports current drug use. Drug: Marijuana [place]). She reports that she does not drink alcohol. Family history:    Family History   Problem Relation Age of Onset    Diabetes Mother          Exam  ED Triage Vitals [11/02/21 0424]   BP Temp Temp src Pulse Resp SpO2 Height Weight   (!) 154/99 -- -- 99 18 99 % -- --     Nursing note and vitals reviewed. Constitutional: Well developed, well nourished. Non-toxic in appearance. BMI 43.26  HENT:      Head: Normocephalic and atraumatic. Ears: External ears normal.      Nose: Nose normal.     Mouth: Membrane mucosa tacky and pink. Eyes: Anicteric sclera. No discharge. Neck: Supple. Trachea midline. Cardiovascular: RRR; no murmurs, rubs, or gallops.   Pulmonary/Chest: Effort normal. No respiratory distress. CTAB. No stridor. No wheezes. No rales. Abdominal: Soft. No distension. Nontender to deep palpation all quadrants. Musculoskeletal: Moves all extremities. No gross deformity. Neurological: Mildly drowsy, easily arousable to voice and light touch. Orientedx4. Face symmetric. Speech is clear. CN 2-12 intact. 5/5 motor and sensation grossly intact all extremities. No pronator drift. Steady gait observed unassisted. .  Skin: Warm and dry. No rash. Psychiatric: Flat affect. Behavior is normal.  Thought process linear    Procedures      EKG    EKG was reviewed by emergency department physician in the absence of a cardiologist    Narrow complex sinus rhythm, rate 94, normal axis, normal IA and QRS intervals, normal Qtc, no ST elevations or depressions, normal t-wave morphology, impression NSR, no STEMI      Radiology  XR CHEST PORTABLE   Final Result   Mild perihilar opacities may represent vascular congestion or a developing   pattern of viral pneumonitis. Labs  Results for orders placed or performed during the hospital encounter of 11/02/21   EKG 12 Lead   Result Value Ref Range    Ventricular Rate 94 BPM    Atrial Rate 94 BPM    P-R Interval 168 ms    QRS Duration 84 ms    Q-T Interval 354 ms    QTc Calculation (Bazett) 442 ms    P Axis 53 degrees    R Axis 15 degrees    T Axis 17 degrees    Diagnosis Normal sinus rhythmNormal ECG        Screenings           MDM and ED Course    Patient afebrile and nontoxic. No distress. She is minimally drowsy, however easily arousable and able to converse appropriately. She is fully oriented and with clear speech. EKG without evidence of acute ischemia. No malignant dysrhythmia. Lungs CTA B, abdomen benign to palpation. Neuro exam is nonfocal, nothing to suggest CVA/TIA, SAH or CNS infection. I do not anticipate any benefit from advanced CT imaging of the head at this time.   CXR read as possible perihilar infiltrate, patient has no

## 2021-11-04 ASSESSMENT — ENCOUNTER SYMPTOMS
COUGH: 0
GASTROINTESTINAL NEGATIVE: 1
RESPIRATORY NEGATIVE: 1
EYES NEGATIVE: 1
SHORTNESS OF BREATH: 0
ALLERGIC/IMMUNOLOGIC NEGATIVE: 1

## 2021-11-04 NOTE — PATIENT INSTRUCTIONS
Goals in preparing for bariatric surgery  You should be giving up all beverages that have carbonation, sugar, and caffeine (Refer to the approved liquids list provided at initial visit).  You should be drinking 64 ounces of low calorie (5 calories or less per serving) fluids per day. Suggestions include:  o Water (you may add fresh lemon or lime)  o Crystal Light  o Bryan Liquid Water Enhancer  o Propel Zero  o Powerade Zero/Gatorade Zero  o Isopure  o Tzspf5V  o SOBE Lifewater Zero  o Vitamin Water Zero  o Sugar Free Gonzalo-Aid  You should be eating 4-6 times per day.  Three small meals plus 1-2 snacks per day is your goal. This balances your calories and nutrients evenly throughout the day and helps to boost your metabolism. Refer to the snack list provided at your initial visit. Aim for a protein at every snack, plus a fruit, vegetable or starch. You should be eating protein at every meal and snack.  Protein is typically found in animal sources, i.e. chicken, lean beef, lean pork, fish, seafood and eggs. It is also found in low-fat dairy sources such as skim or 1% milk, low-fat yogurt, low-fat cheese, and low-fat cottage cheese. Plant based sources of protein include peanut butter, beans, and soy. You should be utilizing the 9-inch plate method.  Eating on a smaller plate will help you control portion size, but what you put on your plate counts also. Make ¼ of your plate lean protein, ¼ carbohydrate (fruit, grain or starchy vegetables) and ½ the plate non-starchy vegetables. You should eliminate caffeine.  Caffeine is dehydrating. After surgery, it's very important to stay hydrated. Giving up caffeine before surgery will help you focus on the changes necessary to be successful after surgery. There are many decaffeinated coffee and tea products available in grocery stores. You should eliminate alcohol.    You must abstain from alcohol prior to surgery and for at least the first 6-9 months after surgery. Although you may have alcohol in the future it is important to understand, as a bariatric surgery patient, there is a risk of it negatively affecting your weight loss goals and it can pose a risk for addiction. You should be reducing added fat and sugar in your diet.  Frying foods adds too much fat and calories, but you could use an air fryer as it requires significantly less oil. Baking, broiling, or grilling meats add flavor without unhealthy fats. Using cooking oil spray and spray butter products are also healthy options that will aid in your weight loss. Foods high in added sugars are often also high in calories and low in nutrients. If you are a smoker or use e-cigarettes/vaping, you must eliminate.  You must be nicotine free and/or not vaping for at least 8 weeks before your surgery can be scheduled. You will be screened for nicotine through lab work. Eating habits after surgery need to be a long-term change. Eating habits are often so ingrained that it can be difficult to change. It is important to practice new eating habits prior to surgery to mentally prepare yourself for the challenge ahead. Also, remember that overall health, age, and genetics make each person's weight loss progress different. Do not compare your progress (pre- or post-operatively), the amount you eat, or your exercise to other patients. In addition, it is the responsibility of the patient to schedule and follow up on labs and tests completed during the pre-surgical period. Results will be reviewed at each visit. **IT IS IMPORTANT TO KNOW THAT YOU MUST COMPLETE ALL REQUIRED DIETARY CHANGES, TESTS, CLEARANCES AND ACTIVITIES BEFORE A SURGERY DATE CAN BE GIVEN. IF YOU HAVE NOT MET ANY OF THESE REQUIREMENTS THEN YOU WILL NOT BE GIVEN A SURGERY DATE UNTIL THEY HAVE BEEN MET TO OUR SATISFACTION.  THIS IS NOT ONLY DONE TO HELP YOU BE SUCCESSFUL AFTER SURGERY, BUT TO BE SAFE AS WELL.**    Patient received dietary handouts and education.

## 2021-11-04 NOTE — PROGRESS NOTES
[facility]   [facility]    Subjective:      Patient ID: Isabelle Jimenez is a 32 y.o. female    HPI    The patient is here for their bariatric surgery presurgical visit for future weight loss. She has made several attempts at weight loss in the past without success and now wishes to pursue bariatric surgery. She is working to change her dietary behaviors and lose weight to improve comorbid conditions such as hypertension and GERD. Isabelle Jimenez is a 32 y.o. female with Body mass index is 47.09 kg/m². Past Medical History:   Diagnosis Date    Abnormal heart rhythm     irregular heart rate    Abnormal Pap smear of cervix     LEEP    H/o Molar pregnancy     Hypertension     Hypoglycemia     Mental disorder     on meds in high school for bipolar, not currently taking medications    Uterus bicornis affecting pregnancy      Past Surgical History:   Procedure Laterality Date    APPENDECTOMY      CHOLECYSTECTOMY      DILATION AND CURETTAGE OF UTERUS      LEEP      TONSILLECTOMY  2016    UPPER GASTROINTESTINAL ENDOSCOPY N/A 10/22/2021    EGD BIOPSY performed by Ramírez Holden MD at [address] [place] [address]     Family History   Problem Relation Age of Onset    Diabetes Mother      Social History     Tobacco Use    Smoking status: Former Smoker     Packs/day: 0.50     Types: Cigarettes     Quit date: 10/24/2016     Years since quittin.0    Smokeless tobacco: Former User     Quit date: 10/29/2016   Substance Use Topics    Alcohol use: No     I counseled the patient on the importance of not smoking and risks of ETOH. Allergies   Allergen Reactions    Latex Rash     Vitals:    11/15/21 1311   BP: 123/82   Pulse: 67   Weight: 249 lb 3.2 oz (113 kg)   Height: 5' 1\" (1.549 m)     Body mass index is 47.09 kg/m².     Current Outpatient Medications:     metoprolol tartrate (LOPRESSOR) 25 MG tablet, Take 25 mg by mouth 2 times daily, Disp: , Rfl:    omeprazole (PRILOSEC) 20 MG delayed release capsule, Take 1 capsule by mouth Daily, Disp: 30 capsule, Rfl: 3    Lab Results   Component Value Date    WBC 11.8 08/04/2021    RBC 4.23 08/04/2021    HGB 12.8 08/04/2021    HCT 37.4 08/04/2021    MCV 88.4 08/04/2021    MCH 30.3 08/04/2021    MCHC 34.3 08/04/2021    MPV 9.1 08/04/2021    NEUTOPHILPCT 74.6 08/04/2021    LYMPHOPCT 14.5 08/04/2021    MONOPCT 9.6 08/04/2021    EOSRELPCT 0.9 08/04/2021    BASOPCT 0.4 08/04/2021    NEUTROABS 8.8 08/04/2021    NEUTROABS 5.40 11/16/2016    LYMPHSABS 1.7 08/04/2021    MONOSABS 1.1 08/04/2021    EOSABS 0.1 08/04/2021     Lab Results   Component Value Date     08/04/2021    K 3.6 08/04/2021     08/04/2021    CO2 22 08/04/2021    ANIONGAP 11 08/04/2021    GLUCOSE 113 08/04/2021    BUN 10 08/04/2021    CREATININE <0.5 08/04/2021    LABGLOM >60 08/04/2021    GFRAA >60 08/04/2021    CALCIUM 9.3 08/04/2021    PROT 7.5 08/04/2021    LABALBU 4.0 08/04/2021    AGRATIO 1.1 08/04/2021    BILITOT 0.5 08/04/2021    ALKPHOS 95 08/04/2021    ALT 81 08/04/2021    AST 39 08/04/2021    GLOB 3.5 08/04/2021     No results found for: CHOL, TRIG, HDL, LDLCALC, LABVLDL  No results found for: TSHREFLEX  No results found for: IRON, TIBC, LABIRON  No results found for: OCOXXDHY28, FOLATE  No results found for: VITD25  Lab Results   Component Value Date    LABA1C 5.2 06/27/2017    .5 06/27/2017       Review of Systems   Constitutional: Negative. Negative for chills, fatigue and fever. HENT: Negative. Eyes: Negative. Respiratory: Negative. Negative for cough and shortness of breath. Cardiovascular: Negative. Gastrointestinal: Negative. Endocrine: Negative. Genitourinary: Negative. Musculoskeletal: Negative. Skin: Negative. Allergic/Immunologic: Negative. Neurological: Negative. Hematological: Negative. Psychiatric/Behavioral: Negative. Objective:   Physical Exam  Vitals reviewed. Constitutional:       Appearance: She is well-developed. HENT:      Head: Normocephalic and atraumatic. Eyes:      Conjunctiva/sclera: Conjunctivae normal.      Pupils: Pupils are equal, round, and reactive to light. Cardiovascular:      Rate and Rhythm: Normal rate. Pulmonary:      Effort: Pulmonary effort is normal.   Abdominal:      Palpations: Abdomen is soft. Musculoskeletal:         General: Normal range of motion. Cervical back: Normal range of motion and neck supple. Skin:     General: Skin is warm and dry. Neurological:      Mental Status: She is alert and oriented to person, place, and time. Psychiatric:         Behavior: Behavior normal.         Thought Content: Thought content normal.         Judgment: Judgment normal.       Assessment and Plan:   Patient is here for their 2nd presurgery visit for sleeve, down 2.4 lbs. The patient's current Body mass index is 47.09 kg/m². (11/15/21). She is making some dietary and behavior modifications, but needs to continue working on eating B/L/D/S daily and getting protein with all. She did meet with the registered dietitian for continued follow up. Discussed with dietitian and I agree with recommendations and plan. She is exercising with some walking. Encouraged continued physical activity. Patient received instruction that it is recommended to avoid pregnancy following bariatric surgery for at least 2 years to allow them to have stable weight loss and to help avoid increased risk of vitamin deficiencies and malnutrition. The patient was encouraged to discuss possible contraceptive methods with their PCP or OBGYN. Patient counseled on the avoidance of tobacco/nicotine, alcohol and illicit drug abuse. Spoke with patient regarding recent EGD biopsy results which showed Stomach, biopsy: Oxyntic mucosa with no significant pathologic change. No Helicobacter-like organisms identified on H&E-stained sections.  Patient will continue her Prilosec as prescribed for now. All questions were answered. Discussed preop work up which still needs labs (Reprinted orders today and provided to patient), psych evaluation (scheduled), cardiac clearance (Scheduled), support group (Will have dietitian e-mail electronic version), PCP letter (Will follow up with PCP as they had indicated to her that it was sent), drug/alchol/nicotine screening (Patient denies use of marijuana and/or CBD at this time) and protein sample (Will purchase some to try today). We will see her back in 1 month for continued follow up. Essential Hypertension:  [x] Continue to make dietary and lifestyle modifications per our recommendations. [x] Reviewed the importance of checking blood pressure. [x] Continue to follow up with their PCP for medication management (Lopressor) and monitoring. Chronic GERD:   [x] Continue to make dietary and lifestyle modifications per our recommendations. [x] Continue PPI (Prilosec). [] Continue H2 Blocker  [] Wean PPI. Take every other day for two weeks. If no issues with heartburn/reflux you may decrease to every third day for two weeks. If no issues with heartburn/reflux you may stop the Prilosec. Recommend that you get OTC Pepcid to take should you have occasional heartburn/reflux. Obesity:  [x] Continue to make dietary and lifestyle modifications per our recommendations. [x] Plan for Future laparoscopic sleeve gastrectomy. [x] Return for follow-up next month.     Total encounter time: 32 minutes, including any number of the following: Bariatric Preoperative work up/protocols, review of labs, imaging, provider notes, outside hospital records, performing examination/evaluation, counseling patient and/or family, ordering medications/tests, placing referrals and communication with referring physicians, coordination of care; discussing exercise and physical activity; discussing dietary plan/recall with the patient as well with registered dietitian and documentation in the EHR. Of note, the above was done during same day of the actual patient encounter.

## 2021-11-15 ENCOUNTER — OFFICE VISIT (OUTPATIENT)
Dept: BARIATRICS/WEIGHT MGMT | Age: 31
End: 2021-11-15
Payer: MEDICAID

## 2021-11-15 VITALS
WEIGHT: 249.2 LBS | BODY MASS INDEX: 47.05 KG/M2 | HEIGHT: 61 IN | HEART RATE: 67 BPM | DIASTOLIC BLOOD PRESSURE: 82 MMHG | SYSTOLIC BLOOD PRESSURE: 123 MMHG

## 2021-11-15 DIAGNOSIS — I10 ESSENTIAL HYPERTENSION: Primary | ICD-10-CM

## 2021-11-15 DIAGNOSIS — E66.01 MORBID OBESITY WITH BMI OF 45.0-49.9, ADULT (HCC): ICD-10-CM

## 2021-11-15 DIAGNOSIS — K21.9 CHRONIC GERD: ICD-10-CM

## 2021-11-15 PROCEDURE — 99214 OFFICE O/P EST MOD 30 MIN: CPT | Performed by: NURSE PRACTITIONER

## 2021-11-15 PROCEDURE — G8427 DOCREV CUR MEDS BY ELIG CLIN: HCPCS | Performed by: NURSE PRACTITIONER

## 2021-11-15 PROCEDURE — G8484 FLU IMMUNIZE NO ADMIN: HCPCS | Performed by: NURSE PRACTITIONER

## 2021-11-15 PROCEDURE — G8417 CALC BMI ABV UP PARAM F/U: HCPCS | Performed by: NURSE PRACTITIONER

## 2021-11-15 PROCEDURE — 1036F TOBACCO NON-USER: CPT | Performed by: NURSE PRACTITIONER

## 2021-11-15 NOTE — PROGRESS NOTES
Israel Tay lost 2.4 lbs over 2 months. Pt reports upcoming appt with     [facility] 10a    Breakfast: skips    Snack: skip    Lunch: 3p baked fish + salad + baked potato    Snack: skips    Dinner: 6p spaghetti with meatballs + salad    Snack: salma crackers + whole milk    Is pt consuming smaller portions? Yes - using measuring cups at times    Is pt consuming at least 64 oz of fluids per day? Yes - water / CL    Is pt consuming carbonated, caffeinated, or sugary beverages? Eliminated    Has pt sampled Unjury and/or Nectar protein? No, reviewed and encouraged    Has patient attended a support group?  Emailed Min Anthony in [facility]    Exercise: trying to walk more, got a puppy and walking    Plan/Recommendations:   - Eat 4-5x day focusing on protein with each meal & snack  - Eat 2hrs after waking   - Switch to 1% of FF milk - use lactose free milk or US nut milks if desires  - Try protein powders    Handouts: Pre-surgical eating guidelines, Protein snacks    Elizabeth President, RD, LD

## 2021-11-30 ENCOUNTER — INITIAL CONSULT (OUTPATIENT)
Dept: BARIATRICS/WEIGHT MGMT | Age: 31
End: 2021-11-30
Payer: MEDICAID

## 2021-11-30 DIAGNOSIS — E66.01 MORBID OBESITY WITH BMI OF 45.0-49.9, ADULT (HCC): ICD-10-CM

## 2021-11-30 DIAGNOSIS — F31.9 BIPOLAR DEPRESSION (HCC): Primary | ICD-10-CM

## 2021-11-30 PROCEDURE — 90791 PSYCH DIAGNOSTIC EVALUATION: CPT | Performed by: PSYCHOLOGIST

## 2021-11-30 NOTE — PROGRESS NOTES
Latrice London presented for her presurgical psychological evaluation on 11/30/2021. The evaluation consisted of a clinical interview, the Eating Habits Checklist, the Binge Eating Disorder Screener - 7 (BEDS-7), and the Russellva  (MBMD) administered by the provider. Based on the evaluation, Israel Bermudez is considered to be a guarded candidate for bariatric surgery from a psychological standpoint. She reports having been diagnosed with Bipolar depression in high school, during which she was medicated and participated in counseling. She reports no significant follow-up treatment as an adult. She states she has been told she can not take many of the psychiatric medications due to her cardiac issues so she is not currently medicated for her mood symptoms. She acknowledges a history of suicidal ideation and suicide attempts as a teenager, but has never been hospitalized psychiatrically. She denies any recent suicidal ideation, or any suicide attempts as an adult. She acknowledges a history of polysubstance abuse, stating she was \"into everything\" for 3-4 years before getting clean 6-7 years ago. She notes \"meth\" was her drug of choice. She states she quit on her own with no intervention, citing her kids as her primary motivation. She denies any current alcohol or other recreational or illicit drug use. She acknowledges a significant history of trauma as a child. Israel Su Alhajisherry states she was diagnosed with an eating disorder as an adolescent when she was purging regularly. She notes her parents discovered her behaviors and took her to counseling, where the negative health effects of purging were explained to her and she discontinued her behavior. Her responses in the interview and on the Eating Habits Checklist and the BEDS-7 do not warrant a current clinical diagnosis. She does, however, acknowledge a struggle with eating \"junk food\" in response to boredom.  She reports a history of skipping regular meals and, while she notes some improvement, she continues to struggle to eat frequently enough throughout the day and is heavily relying on liquid meal replacement supplements/protein shakes. She is actively weaning herself from caffeine and carbonated beverages, citing her current use as two cans of Diet Coke per week. She states she is \"really bad with water,\" stating she typically only drinks 1-2 bottles per day. She endorsed self-punitive thoughts and feelings related to her weight and/or eating behaviors. She denies any recent binge eating or purging behavior. Israel Tay maintains a high level of functional activity caring for her home and family. 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 She currently resides with her , their three children, ages 15 5 and 1years old, her mother, and her 4 siblings. Israel Tay completed the MBMD as part of the evaluation. Her profile is valid. Results indicate no significant negative health habits at this time. She endorsed slightly more emotional lability and interpersonal guardedness than the typical medical patient. Her profile is characterized by a public posture of assertiveness, and a tendency to maintain a cool distance from others. Her guardedness is probably due in large part to her own insecurities and the perceived risk of rejection or humiliation by others. Because she is accustomed to feeling strong and unassailable, she may struggle with the vulnerable role of medical patient. Her initial response to illness is likely to be denial, followed by annoyance or blaming as her level of discomfort or debilitation increases. Providers should adopt a straightforward and businesslike demeanor.  Allowing her to participate as much as possible in the planning and implementation of her self-care regimen should enhance compliance by drawing on her desire for self-mastery and control. Ms. Nikos Calhoun' profile indicates she may exhibit a strong emotional reaction to stressful medical procedures. The coping assets reflected in her profile include future optimism, medication conscientiousness, and openness to receiving feedback and/or discussing matters pertaining to her health. Israel Brandon exhibited adequate awareness of the risks of bariatric surgery; however, she believes the benefits will outweigh the potential risks, as she hopes to minimize or reverse the effects of several medical concerns, including hypertension, GERD, fatty liver, and her cardiac issues. She reports realistic expectations for the procedure, as her overall goals include improved health, increased self-confidence, and a goal weight of 170-180 lbs. She understands the need for permanent lifestyle change, including dietary modifications and a regular exercise program, and expressed willingness to implement the necessary changes. She expressed a commitment to comply with treatment recommendations through this office. She identified her  and a close friend who has already undergone weight loss surgery as a good support system in her efforts to meet her weight management goals. In summary, Kalie Flowers is considered to be a guarded candidate for bariatric surgery from a psychological standpoint. While her mood appears adequately stable at this time, she acknowledges ongoing mood symptoms that are currently untreated, with no mental health resources in place. She also acknowledges struggling to implement some of the dietary changes being required of her. She is, however, highly motivated for the procedure, and may be considered an appropriate candidate if she can maintain stability of mood and continue to make progress with her dietary choices.  She would likely benefit from ongoing presurgical support from our behaviorist, Iliana Oneil, [address] She was encouraged to participate in support group activities through [facility] as well. Feel free to consult with me as needed with any further questions regarding this evaluation. Patient spent 36 minutes completing the psychological testing. Provider spent 50 minutes in test evaluation services on 11/30/2021, and an additional 15 minutes on 12/07/2021.

## 2021-12-24 ASSESSMENT — ENCOUNTER SYMPTOMS
ALLERGIC/IMMUNOLOGIC NEGATIVE: 1
SHORTNESS OF BREATH: 0
RESPIRATORY NEGATIVE: 1
GASTROINTESTINAL NEGATIVE: 1
EYES NEGATIVE: 1
COUGH: 0

## 2021-12-24 NOTE — PATIENT INSTRUCTIONS
Goals in preparing for bariatric surgery  You should be giving up all beverages that have carbonation, sugar, and caffeine (Refer to the approved liquids list provided at initial visit).  You should be drinking 64 ounces of low calorie (5 calories or less per serving) fluids per day. Suggestions include:  o Water (you may add fresh lemon or lime)  o Crystal Light  o Bryan Liquid Water Enhancer  o Propel Zero  o Powerade Zero/Gatorade Zero  o Isopure  o [id number]  o SOBE Lifewater Zero  o Vitamin Water Zero  o Sugar Free Gonzalo-Aid  You should be eating 4-6 times per day.  Three small meals plus 1-2 snacks per day is your goal. This balances your calories and nutrients evenly throughout the day and helps to boost your metabolism. Refer to the snack list provided at your initial visit. Aim for a protein at every snack, plus a fruit, vegetable or starch. You should be eating protein at every meal and snack.  Protein is typically found in animal sources, i.e. chicken, lean beef, lean pork, fish, seafood and eggs. It is also found in low-fat dairy sources such as skim or 1% milk, low-fat yogurt, low-fat cheese, and low-fat cottage cheese. Plant based sources of protein include peanut butter, beans, and soy. You should be utilizing the 9-inch plate method.  Eating on a smaller plate will help you control portion size, but what you put on your plate counts also. Make ¼ of your plate lean protein, ¼ carbohydrate (fruit, grain or starchy vegetables) and ½ the plate non-starchy vegetables. You should eliminate caffeine.  Caffeine is dehydrating. After surgery, it's very important to stay hydrated. Giving up caffeine before surgery will help you focus on the changes necessary to be successful after surgery. There are many decaffeinated coffee and tea products available in grocery stores. You should eliminate alcohol.    You must abstain from alcohol prior to surgery and for at least the first 6-9 months after surgery. Although you may have alcohol in the future it is important to understand, as a bariatric surgery patient, there is a risk of it negatively affecting your weight loss goals and it can pose a risk for addiction. You should be reducing added fat and sugar in your diet.  Frying foods adds too much fat and calories, but you could use an air fryer as it requires significantly less oil. Baking, broiling, or grilling meats add flavor without unhealthy fats. Using cooking oil spray and spray butter products are also healthy options that will aid in your weight loss. Foods high in added sugars are often also high in calories and low in nutrients. If you are a smoker or use e-cigarettes/vaping, you must eliminate.  You must be nicotine free and/or not vaping for at least 8 weeks before your surgery can be scheduled. You will be screened for nicotine through lab work. Eating habits after surgery need to be a long-term change. Eating habits are often so ingrained that it can be difficult to change. It is important to practice new eating habits prior to surgery to mentally prepare yourself for the challenge ahead. Also, remember that overall health, age, and genetics make each person's weight loss progress different. Do not compare your progress (pre- or post-operatively), the amount you eat, or your exercise to other patients. In addition, it is the responsibility of the patient to schedule and follow up on labs and tests completed during the pre-surgical period. Results will be reviewed at each visit. **IT IS IMPORTANT TO KNOW THAT YOU MUST COMPLETE ALL REQUIRED DIETARY CHANGES, TESTS, CLEARANCES AND ACTIVITIES BEFORE A SURGERY DATE CAN BE GIVEN. IF YOU HAVE NOT MET ANY OF THESE REQUIREMENTS THEN YOU WILL NOT BE GIVEN A SURGERY DATE UNTIL THEY HAVE BEEN MET TO OUR SATISFACTION.  THIS IS NOT ONLY DONE TO HELP YOU BE SUCCESSFUL AFTER SURGERY, BUT TO BE SAFE AS WELL.**    Patient received dietary handouts and education.     Plan/Recommendations:   - Focus on lean protein 4x/day  - Eliminate soda & sparkling water  - Complete Support Group

## 2021-12-24 NOTE — PROGRESS NOTES
[facility]) Physicians   [employer] [facility] [employer]    Subjective:      Patient ID: Nicko Shirley is a 32 y.o. female    HPI    The patient is here for their bariatric surgery presurgical visit for future weight loss. She has made several attempts at weight loss in the past without success and now wishes to pursue bariatric surgery. She is working to change her dietary behaviors and lose weight to improve comorbid conditions such as hypertension and GERD. Nicko Shirley is a 32 y.o. female with Body mass index is 48.75 kg/m². Past Medical History:   Diagnosis Date    Abnormal heart rhythm     irregular heart rate    Abnormal Pap smear of cervix 2015    LEEP    H/o Molar pregnancy     Hypertension     Hypoglycemia     Mental disorder     on meds in high school for bipolar, not currently taking medications    Uterus bicornis affecting pregnancy      Past Surgical History:   Procedure Laterality Date    APPENDECTOMY      CHOLECYSTECTOMY      DILATION AND CURETTAGE OF UTERUS      LEEP      TONSILLECTOMY  2016    UPPER GASTROINTESTINAL ENDOSCOPY N/A 10/22/2021    EGD BIOPSY performed by Lio Horn MD at [address]     Family History   Problem Relation Age of Onset    Diabetes Mother      Social History     Tobacco Use    Smoking status: Former Smoker     Packs/day: 0.50     Types: Cigarettes     Quit date: 10/24/2016     Years since quittin.1    Smokeless tobacco: Former User     Quit date: 10/29/2016   Substance Use Topics    Alcohol use: No     I counseled the patient on the importance of not smoking and risks of ETOH. Allergies   Allergen Reactions    Latex Rash     Vitals:    22 1349   BP: 124/70   Pulse: 66   Resp: 16   Weight: 258 lb (117 kg)   Height: 5' 1\" (1.549 m)     Body mass index is 48.75 kg/m².     Current Outpatient Medications:     metoprolol tartrate (LOPRESSOR) 25 MG tablet, Take 25 mg by mouth 2 times daily, Disp: , Rfl:    omeprazole (PRILOSEC) 20 MG delayed release capsule, Take 1 capsule by mouth Daily, Disp: 30 capsule, Rfl: 3    Lab Results   Component Value Date    WBC 11.8 08/04/2021    RBC 4.23 08/04/2021    HGB 12.8 08/04/2021    HCT 37.4 08/04/2021    MCV 88.4 08/04/2021    MCH 30.3 08/04/2021    MCHC 34.3 08/04/2021    MPV 9.1 08/04/2021    NEUTOPHILPCT 74.6 08/04/2021    LYMPHOPCT 14.5 08/04/2021    MONOPCT 9.6 08/04/2021    EOSRELPCT 0.9 08/04/2021    BASOPCT 0.4 08/04/2021    NEUTROABS 8.8 08/04/2021    NEUTROABS 5.40 11/16/2016    LYMPHSABS 1.7 08/04/2021    MONOSABS 1.1 08/04/2021    EOSABS 0.1 08/04/2021     Lab Results   Component Value Date     08/04/2021    K 3.6 08/04/2021     08/04/2021    CO2 22 08/04/2021    ANIONGAP 11 08/04/2021    GLUCOSE 113 08/04/2021    BUN 10 08/04/2021    CREATININE <0.5 08/04/2021    LABGLOM >60 08/04/2021    GFRAA >60 08/04/2021    CALCIUM 9.3 08/04/2021    PROT 7.5 08/04/2021    LABALBU 4.0 08/04/2021    AGRATIO 1.1 08/04/2021    BILITOT 0.5 08/04/2021    ALKPHOS 95 08/04/2021    ALT 81 08/04/2021    AST 39 08/04/2021    GLOB 3.5 08/04/2021     No results found for: CHOL, TRIG, HDL, LDLCALC, LABVLDL  No results found for: TSHREFLEX  No results found for: IRON, TIBC, LABIRON  No results found for: [id number], FOLATE  No results found for: VITD25  Lab Results   Component Value Date    LABA1C 5.2 06/27/2017    .5 06/27/2017       Review of Systems   Constitutional: Negative. Negative for chills, fatigue and fever. HENT: Negative. Eyes: Negative. Respiratory: Negative. Negative for cough and shortness of breath. Cardiovascular: Negative. Gastrointestinal: Negative. Endocrine: Negative. Genitourinary: Negative. Musculoskeletal: Negative. Skin: Negative. Allergic/Immunologic: Negative. Neurological: Negative. Hematological: Negative. Psychiatric/Behavioral: Negative. Objective:   Physical Exam  Vitals reviewed. Constitutional:       Appearance: She is well-developed. HENT:      Head: Normocephalic and atraumatic. Eyes:      Conjunctiva/sclera: Conjunctivae normal.      Pupils: Pupils are equal, round, and reactive to light. Cardiovascular:      Rate and Rhythm: Normal rate. Pulmonary:      Effort: Pulmonary effort is normal.   Abdominal:      Palpations: Abdomen is soft. Musculoskeletal:         General: Normal range of motion. Cervical back: Normal range of motion and neck supple. Skin:     General: Skin is warm and dry. Neurological:      Mental Status: She is alert and oriented to person, place, and time. Psychiatric:         Behavior: Behavior normal.         Thought Content: Thought content normal.         Judgment: Judgment normal.       Assessment and Plan:   Patient is here for their 3rd presurgery visit for sleeve, up 8.8 lbs. The patient's current Body mass index is 48.75 kg/m². (1/3/22). She is making dietary and behavior modifications, but needs to continue working on getting protein with all meals/snacks and eliminate carbonation/caffeine. She did meet with the registered dietitian for continued follow up. Discussed with dietitian and I agree with recommendations and plan. She is exercising with going to [employer] two days per week doing cardio and strength training. Encouraged continued physical activity. Patient received instruction that it is recommended to avoid pregnancy following bariatric surgery for at least 2 years to allow them to have stable weight loss and to help avoid increased risk of vitamin deficiencies and malnutrition. The patient was encouraged to discuss possible contraceptive methods with their PCP or OBGYN. Patient counseled on the avoidance of tobacco/nicotine, alcohol and illicit drug abuse.  Discussed preop work up which still needs labs ([facility] orders are in her chart [facility]), cardiac clearance (follow up with them in

## 2022-01-03 ENCOUNTER — OFFICE VISIT (OUTPATIENT)
Dept: BARIATRICS/WEIGHT MGMT | Age: 32
End: 2022-01-03
Payer: MEDICAID

## 2022-01-03 VITALS
WEIGHT: 258 LBS | BODY MASS INDEX: 48.71 KG/M2 | SYSTOLIC BLOOD PRESSURE: 124 MMHG | HEART RATE: 66 BPM | DIASTOLIC BLOOD PRESSURE: 70 MMHG | HEIGHT: 61 IN | RESPIRATION RATE: 16 BRPM

## 2022-01-03 DIAGNOSIS — I10 ESSENTIAL HYPERTENSION: Primary | ICD-10-CM

## 2022-01-03 DIAGNOSIS — E66.01 MORBID OBESITY WITH BMI OF 45.0-49.9, ADULT (HCC): ICD-10-CM

## 2022-01-03 DIAGNOSIS — K21.9 CHRONIC GERD: ICD-10-CM

## 2022-01-03 PROCEDURE — 99214 OFFICE O/P EST MOD 30 MIN: CPT | Performed by: NURSE PRACTITIONER

## 2022-01-03 PROCEDURE — G8484 FLU IMMUNIZE NO ADMIN: HCPCS | Performed by: NURSE PRACTITIONER

## 2022-01-03 PROCEDURE — G8417 CALC BMI ABV UP PARAM F/U: HCPCS | Performed by: NURSE PRACTITIONER

## 2022-01-03 PROCEDURE — 1036F TOBACCO NON-USER: CPT | Performed by: NURSE PRACTITIONER

## 2022-01-03 PROCEDURE — G8427 DOCREV CUR MEDS BY ELIG CLIN: HCPCS | Performed by: NURSE PRACTITIONER

## 2022-01-03 NOTE — PROGRESS NOTES
Israel Tay gained 8.8 lbs over past ~2 months. Pt states she is really struggling with soda, switched to diet soda in the middle of last month. Is pt eating at least 4 times everyday? yes     B- bowl of cereal (special K or plain cheerios) OR bagel w/ cream cheese  S- ~1/2 cup fruit OR vegetables w/ 1 cup almond or skim milk  L- rice OR eggs OR tilapia w/ broccoli & cheese or mashed potatoes or baked potato  S- none  D- chicken & noodles OR chicken rice  S- none    Is pt eating a lean protein source with all meals and snacks? most of the time    Has pt decreased their portions using the plate method? measuring portions & using 9\" plate    Is pt choosing low fat/sugar free options? yes as a rule, struggling with soda    Is pt drinking at least 64 oz of clear liquids everyday? yes - gatorade zero / powerade zero / CL / water    Has pt stopped drinking carbonation, caffeinated, and sugar sweetened beverages? 1 diet soda daily switched in the middle of last month / having some sparkling water    Has pt sampled Unjury and/or Nectar protein? yes - tried & tolerated    Has pt attended a support group?  Not scheduled     Participating in intentional exercise? started PF 2x/wk - cardio & strength training    Plan/Recommendations:   - Focus on lean protein 4x/day  - Eliminate soda & sparkling water  - Complete Support Group    Handouts: SG schedule    Tin Gil RD, TRELL

## 2022-02-07 ASSESSMENT — ENCOUNTER SYMPTOMS
GASTROINTESTINAL NEGATIVE: 1
COUGH: 0
RESPIRATORY NEGATIVE: 1
ALLERGIC/IMMUNOLOGIC NEGATIVE: 1
SHORTNESS OF BREATH: 0
EYES NEGATIVE: 1

## 2022-02-07 NOTE — PROGRESS NOTES
[facility]) Physicians   [employer] [facility] [employer]    Subjective:      Patient ID: Deirdre Gutierrez is a 28 y.o. female    HPI    The patient is here for their bariatric surgery presurgical visit for future weight loss. She has made several attempts at weight loss in the past without success and now wishes to pursue bariatric surgery. She is working to change her dietary behaviors and lose weight to improve comorbid conditions such as hypertension and GERD. Israel Martínez is a 28 y.o. female with Body mass index is 49.13 kg/m². Past Medical History:   Diagnosis Date    Abnormal heart rhythm     irregular heart rate    Abnormal Pap smear of cervix 2015    LEEP    H/o Molar pregnancy     Hypertension     Hypoglycemia     Mental disorder     on meds in high school for bipolar, not currently taking medications    Uterus bicornis affecting pregnancy      Past Surgical History:   Procedure Laterality Date    APPENDECTOMY      CHOLECYSTECTOMY      DILATION AND CURETTAGE OF UTERUS      LEEP      TONSILLECTOMY  2016    UPPER GASTROINTESTINAL ENDOSCOPY N/A 10/22/2021    EGD BIOPSY performed by Andrea Norwood MD at [address] [place] [address]     Family History   Problem Relation Age of Onset    Diabetes Mother      Social History     Tobacco Use    Smoking status: Former Smoker     Packs/day: 0.50     Types: Cigarettes     Quit date: 10/24/2016     Years since quittin.3    Smokeless tobacco: Former User     Quit date: 10/29/2016   Substance Use Topics    Alcohol use: No     I counseled the patient on the importance of not smoking and risks of ETOH. Allergies   Allergen Reactions    Latex Rash     Vitals:    22 1355   BP: 117/70   Pulse: 74   Weight: 260 lb (117.9 kg)   Height: 5' 1\" (1.549 m)     Body mass index is 49.13 kg/m².     Current Outpatient Medications:     metoprolol tartrate (LOPRESSOR) 25 MG tablet, Take 25 mg by mouth 2 times daily, Disp: , Rfl:     omeprazole (PRILOSEC) 20 MG delayed release capsule, Take 1 capsule by mouth Daily, Disp: 30 capsule, Rfl: 3    Lab Results   Component Value Date    WBC 11.8 08/04/2021    RBC 4.23 08/04/2021    HGB 12.8 08/04/2021    HCT 37.4 08/04/2021    MCV 88.4 08/04/2021    MCH 30.3 08/04/2021    MCHC 34.3 08/04/2021    MPV 9.1 08/04/2021    NEUTOPHILPCT 74.6 08/04/2021    LYMPHOPCT 14.5 08/04/2021    MONOPCT 9.6 08/04/2021    EOSRELPCT 0.9 08/04/2021    BASOPCT 0.4 08/04/2021    NEUTROABS 8.8 08/04/2021    NEUTROABS 5.40 11/16/2016    LYMPHSABS 1.7 08/04/2021    MONOSABS 1.1 08/04/2021    EOSABS 0.1 08/04/2021     Lab Results   Component Value Date     08/04/2021    K 3.6 08/04/2021     08/04/2021    CO2 22 08/04/2021    ANIONGAP 11 08/04/2021    GLUCOSE 113 08/04/2021    BUN 10 08/04/2021    CREATININE <0.5 08/04/2021    LABGLOM >60 08/04/2021    GFRAA >60 08/04/2021    CALCIUM 9.3 08/04/2021    PROT 7.5 08/04/2021    LABALBU 4.0 08/04/2021    AGRATIO 1.1 08/04/2021    BILITOT 0.5 08/04/2021    ALKPHOS 95 08/04/2021    ALT 81 08/04/2021    AST 39 08/04/2021    GLOB 3.5 08/04/2021     No results found for: CHOL, TRIG, HDL, LDLCALC, LABVLDL  No results found for: TSHREFLEX  No results found for: IRON, TIBC, LABIRON  No results found for: [id number], FOLATE  No results found for: VITD25  Lab Results   Component Value Date    LABA1C 5.2 06/27/2017    .5 06/27/2017       Review of Systems   Constitutional: Negative. Negative for chills, fatigue and fever. HENT: Negative. Eyes: Negative. Respiratory: Negative. Negative for cough and shortness of breath. Cardiovascular: Negative. Gastrointestinal: Negative. Endocrine: Negative. Genitourinary: Negative. Musculoskeletal: Negative. Skin: Negative. Allergic/Immunologic: Negative. Neurological: Negative. Hematological: Negative. Psychiatric/Behavioral: Negative. Objective:   Physical Exam  Vitals reviewed.    Constitutional: Appearance: She is well-developed. HENT:      Head: Normocephalic and atraumatic. Eyes:      Conjunctiva/sclera: Conjunctivae normal.      Pupils: Pupils are equal, round, and reactive to light. Cardiovascular:      Rate and Rhythm: Normal rate. Pulmonary:      Effort: Pulmonary effort is normal.   Abdominal:      Palpations: Abdomen is soft. Musculoskeletal:         General: Normal range of motion. Cervical back: Normal range of motion and neck supple. Skin:     General: Skin is warm and dry. Neurological:      Mental Status: She is alert and oriented to person, place, and time. Psychiatric:         Behavior: Behavior normal.         Thought Content: Thought content normal.         Judgment: Judgment normal.       Assessment and Plan:   Patient is here for their 4th presurgery visit for sleeve, up 2 lbs. The patient's current Body mass index is 49.13 kg/m². (2/14/22). She is making dietary and behavior modifications. Needs to make sure she is not skipping meals. Recommended protein shake for those times when she is running her child to doctor's appointments. She did meet with the registered dietitian for continued follow up. Discussed with dietitian and I agree with recommendations and plan. She is exercising at [employer] 3-4 days per week doing cardio and strength training. Encouraged continued physical activity. Patient received instruction that it is recommended to avoid pregnancy following bariatric surgery for at least 2 years to allow them to have stable weight loss and to help avoid increased risk of vitamin deficiencies and malnutrition. The patient was encouraged to discuss possible contraceptive methods with their PCP or OBGYN. Patient counseled on the avoidance of tobacco/nicotine, alcohol and illicit drug abuse. Discussed preop work up which still needs labs (Plans on completing tomorrow), cardiac clearance (needs to follow up - scheduled), support group (Missed one this month. Signed up for one in March), PCP letter (Scheduled) and drug/alcohol/nicotine screening. We will see her back in 1 month for continued follow up. Essential Hypertension:  [x] Continue to make dietary and lifestyle modifications per our recommendations. [x] Reviewed the importance of checking blood pressure. [x] Continue to follow up with their PCP for medication management (Lopressor) and monitoring. [] Follow up labs ordered today. Chronic GERD:   [x] Continue to make dietary and lifestyle modifications per our recommendations. [x] Continue PPI (Prilosec). [] Continue H2 Blocker  [] Wean PPI. Take every other day for two weeks. If no issues with heartburn/reflux you may decrease to every third day for two weeks. If no issues with heartburn/reflux you may stop the Prilosec. Recommend that you get OTC Pepcid to take should you have occasional heartburn/reflux. Obesity:  [x] Continue to make dietary and lifestyle modifications per our recommendations. [x] Plan for Future laparoscopic sleeve gastrectomy. [x] Return for follow-up next month. Total encounter time: 30 minutes, including any number of the following: Bariatric Preoperative work up/protocols, review of labs, imaging, provider notes, outside hospital records, performing examination/evaluation, counseling patient and/or family, ordering medications/tests, placing referrals and communication with referring physicians, coordination of care; discussing exercise and physical activity; discussing dietary plan/recall with the patient as well with registered dietitian and documentation in the EHR. Of note, the above was done during same day of the actual patient encounter.

## 2022-02-07 NOTE — PATIENT INSTRUCTIONS
Goals in preparing for bariatric surgery  You should be giving up all beverages that have carbonation, sugar, and caffeine (Refer to the approved liquids list provided at initial visit).  You should be drinking 64 ounces of low calorie (5 calories or less per serving) fluids per day. Suggestions include:  o Water (you may add fresh lemon or lime)  o Crystal Light  o Bryan Liquid Water Enhancer  o Propel Zero  o Powerade Zero/Gatorade Zero  o Isopure  o [id number]  o SOBE Lifewater Zero  o Vitamin Water Zero  o Sugar Free Gonzalo-Aid  You should be eating 4-6 times per day.  Three small meals plus 1-2 snacks per day is your goal. This balances your calories and nutrients evenly throughout the day and helps to boost your metabolism. Refer to the snack list provided at your initial visit. Aim for a protein at every snack, plus a fruit, vegetable or starch. You should be eating protein at every meal and snack.  Protein is typically found in animal sources, i.e. chicken, lean beef, lean pork, fish, seafood and eggs. It is also found in low-fat dairy sources such as skim or 1% milk, low-fat yogurt, low-fat cheese, and low-fat cottage cheese. Plant based sources of protein include peanut butter, beans, and soy. You should be utilizing the 9-inch plate method.  Eating on a smaller plate will help you control portion size, but what you put on your plate counts also. Make ¼ of your plate lean protein, ¼ carbohydrate (fruit, grain or starchy vegetables) and ½ the plate non-starchy vegetables. You should eliminate caffeine.  Caffeine is dehydrating. After surgery, it's very important to stay hydrated. Giving up caffeine before surgery will help you focus on the changes necessary to be successful after surgery. There are many decaffeinated coffee and tea products available in grocery stores. You should eliminate alcohol.    You must abstain from alcohol prior to surgery and for at least the first 6-9 months after surgery. Although you may have alcohol in the future it is important to understand, as a bariatric surgery patient, there is a risk of it negatively affecting your weight loss goals and it can pose a risk for addiction. You should be reducing added fat and sugar in your diet.  Frying foods adds too much fat and calories, but you could use an air fryer as it requires significantly less oil. Baking, broiling, or grilling meats add flavor without unhealthy fats. Using cooking oil spray and spray butter products are also healthy options that will aid in your weight loss. Foods high in added sugars are often also high in calories and low in nutrients. If you are a smoker or use e-cigarettes/vaping, you must eliminate.  You must be nicotine free and/or not vaping for at least 8 weeks before your surgery can be scheduled. You will be screened for nicotine through lab work. Eating habits after surgery need to be a long-term change. Eating habits are often so ingrained that it can be difficult to change. It is important to practice new eating habits prior to surgery to mentally prepare yourself for the challenge ahead. Also, remember that overall health, age, and genetics make each person's weight loss progress different. Do not compare your progress (pre- or post-operatively), the amount you eat, or your exercise to other patients. In addition, it is the responsibility of the patient to schedule and follow up on labs and tests completed during the pre-surgical period. Results will be reviewed at each visit. **IT IS IMPORTANT TO KNOW THAT YOU MUST COMPLETE ALL REQUIRED DIETARY CHANGES, TESTS, CLEARANCES AND ACTIVITIES BEFORE A SURGERY DATE CAN BE GIVEN. IF YOU HAVE NOT MET ANY OF THESE REQUIREMENTS THEN YOU WILL NOT BE GIVEN A SURGERY DATE UNTIL THEY HAVE BEEN MET TO OUR SATISFACTION.  THIS IS NOT ONLY DONE TO HELP YOU BE SUCCESSFUL AFTER SURGERY, BUT TO BE SAFE AS WELL.**    Patient received dietary handouts and education.     Plan/Recommendations:   - Eat 4 planned times/day including a protein + plant  - Use Nectar 1/day, avoid [place] Instant Breakfast  - Start tracking with [employer]itness[employer] and bring to review next to next visit

## 2022-02-14 ENCOUNTER — OFFICE VISIT (OUTPATIENT)
Dept: BARIATRICS/WEIGHT MGMT | Age: 32
End: 2022-02-14
Payer: MEDICAID

## 2022-02-14 VITALS
BODY MASS INDEX: 49.09 KG/M2 | HEART RATE: 74 BPM | SYSTOLIC BLOOD PRESSURE: 117 MMHG | DIASTOLIC BLOOD PRESSURE: 70 MMHG | WEIGHT: 260 LBS | HEIGHT: 61 IN

## 2022-02-14 DIAGNOSIS — I10 ESSENTIAL HYPERTENSION: ICD-10-CM

## 2022-02-14 DIAGNOSIS — K21.9 CHRONIC GERD: Primary | ICD-10-CM

## 2022-02-14 DIAGNOSIS — E66.01 MORBID OBESITY WITH BMI OF 45.0-49.9, ADULT (HCC): ICD-10-CM

## 2022-02-14 PROCEDURE — G8484 FLU IMMUNIZE NO ADMIN: HCPCS | Performed by: NURSE PRACTITIONER

## 2022-02-14 PROCEDURE — 1036F TOBACCO NON-USER: CPT | Performed by: NURSE PRACTITIONER

## 2022-02-14 PROCEDURE — G8417 CALC BMI ABV UP PARAM F/U: HCPCS | Performed by: NURSE PRACTITIONER

## 2022-02-14 PROCEDURE — G8427 DOCREV CUR MEDS BY ELIG CLIN: HCPCS | Performed by: NURSE PRACTITIONER

## 2022-02-14 PROCEDURE — 99214 OFFICE O/P EST MOD 30 MIN: CPT | Performed by: NURSE PRACTITIONER

## 2022-02-14 NOTE — PROGRESS NOTES
Israel Tay gained 2 lbs over 6 weeks. Pt expressed concerns w/ lack of weight loss w/ diet changes. Wake up 10 AM  Breakfast: Cereal (cheerios or cornflakes) w/ unsweetend almond milk + fruit  Snack: None  Lunch: 1-2 PM: Tilapia side salad, plain OR 2 baked potatoes w/ salt + side salad, plain  Snack: None  Dinner: 8-9 PM: [place] instant breakfast w/ water OR 1/2 bagel + fruits OR celery w/ PB   Snack: None  Bed around 11 PM, feels rested     Is pt consuming smaller portions? states she is using 9 inch plate + measuring cups    Is pt consuming at least 64 oz of fluids per day? yes 4-5 bottles water + more gatorade zero/ CL    Is pt consuming carbonated, caffeinated, or sugary beverages? no eliminated all carbonation, avoiding SSB     Has pt sampled Unjury and/or Nectar protein? Yes, likes most flavors     Has patient attended a support group?  Not scheduled plans to sign up today    Exercise: PF 3-4x/wk \"a little bit of everything\" met w/  - focused on one area each time      Plan/Recommendations:   - Eat 4 planned times/day including a protein + plant  - Use Nectar 1/day, avoid [place] [employer] Breakfast  - Start tracking with My[employer]itnessPal and bring to review next to next visit     Handouts: protein content of common foods    Magdalene Machado RD, LD

## 2022-03-16 ENCOUNTER — HOSPITAL ENCOUNTER (OUTPATIENT)
Age: 32
Discharge: HOME | End: 2022-03-16
Payer: MEDICAID

## 2022-03-16 DIAGNOSIS — E66.01 MORBID OBESITY WITH BMI OF 45.0-49.9, ADULT (HCC): ICD-10-CM

## 2022-03-16 DIAGNOSIS — I10 ESSENTIAL HYPERTENSION: ICD-10-CM

## 2022-03-16 DIAGNOSIS — K21.9 CHRONIC GERD: ICD-10-CM

## 2022-03-16 DIAGNOSIS — Z01.818 PREOPERATIVE CLEARANCE: ICD-10-CM

## 2022-03-16 LAB
A/G RATIO: 1.4 (ref 1.1–2.2)
ALBUMIN SERPL-MCNC: 4.4 G/DL (ref 3.4–5)
ALP BLD-CCNC: 87 U/L (ref 40–129)
ALT SERPL-CCNC: 53 U/L (ref 10–40)
ANION GAP SERPL CALCULATED.3IONS-SCNC: 15 MMOL/L (ref 3–16)
AST SERPL-CCNC: 31 U/L (ref 15–37)
BASOPHILS ABSOLUTE: 0 K/UL (ref 0–0.2)
BASOPHILS RELATIVE PERCENT: 0.6 %
BILIRUB SERPL-MCNC: 0.4 MG/DL (ref 0–1)
BUN BLDV-MCNC: 11 MG/DL (ref 7–20)
CALCIUM SERPL-MCNC: 9.5 MG/DL (ref 8.3–10.6)
CHLORIDE BLD-SCNC: 106 MMOL/L (ref 99–110)
CHOLESTEROL, TOTAL: 165 MG/DL (ref 0–199)
CO2: 19 MMOL/L (ref 21–32)
CREAT SERPL-MCNC: 0.5 MG/DL (ref 0.6–1.1)
EOSINOPHILS ABSOLUTE: 0.2 K/UL (ref 0–0.6)
EOSINOPHILS RELATIVE PERCENT: 2.4 %
FOLATE: 7.52 NG/ML (ref 4.78–24.2)
GFR AFRICAN AMERICAN: >60
GFR NON-AFRICAN AMERICAN: >60
GLUCOSE BLD-MCNC: 93 MG/DL (ref 70–99)
HCT VFR BLD CALC: 40 % (ref 36–48)
HDLC SERPL-MCNC: 44 MG/DL (ref 40–60)
HEMOGLOBIN: 13.2 G/DL (ref 12–16)
INR BLD: 1.02 (ref 0.88–1.12)
IRON SATURATION: 25 % (ref 15–50)
IRON: 89 UG/DL (ref 37–145)
LDL CHOLESTEROL CALCULATED: 105 MG/DL
LYMPHOCYTES ABSOLUTE: 2.2 K/UL (ref 1–5.1)
LYMPHOCYTES RELATIVE PERCENT: 29.8 %
MCH RBC QN AUTO: 29.5 PG (ref 26–34)
MCHC RBC AUTO-ENTMCNC: 33.1 G/DL (ref 31–36)
MCV RBC AUTO: 88.9 FL (ref 80–100)
MONOCYTES ABSOLUTE: 0.7 K/UL (ref 0–1.3)
MONOCYTES RELATIVE PERCENT: 9.2 %
NEUTROPHILS ABSOLUTE: 4.2 K/UL (ref 1.7–7.7)
NEUTROPHILS RELATIVE PERCENT: 58 %
PDW BLD-RTO: 13.9 % (ref 12.4–15.4)
PLATELET # BLD: 281 K/UL (ref 135–450)
PMV BLD AUTO: 9.4 FL (ref 5–10.5)
POTASSIUM SERPL-SCNC: 4.3 MMOL/L (ref 3.5–5.1)
PROTHROMBIN TIME: 11.5 SEC (ref 9.9–12.7)
RBC # BLD: 4.5 M/UL (ref 4–5.2)
SODIUM BLD-SCNC: 140 MMOL/L (ref 136–145)
TOTAL IRON BINDING CAPACITY: 354 UG/DL (ref 260–445)
TOTAL PROTEIN: 7.5 G/DL (ref 6.4–8.2)
TRIGL SERPL-MCNC: 80 MG/DL (ref 0–150)
TSH REFLEX: 1.89 UIU/ML (ref 0.27–4.2)
VITAMIN B-12: 573 PG/ML (ref 211–911)
VITAMIN D 25-HYDROXY: 18.7 NG/ML
VLDLC SERPL CALC-MCNC: 16 MG/DL
WBC # BLD: 7.3 K/UL (ref 4–11)

## 2022-03-16 PROCEDURE — 36415 COLL VENOUS BLD VENIPUNCTURE: CPT

## 2022-03-16 PROCEDURE — 82306 VITAMIN D 25 HYDROXY: CPT

## 2022-03-16 PROCEDURE — 84425 ASSAY OF VITAMIN B-1: CPT

## 2022-03-16 PROCEDURE — 82746 ASSAY OF FOLIC ACID SERUM: CPT

## 2022-03-16 PROCEDURE — 85025 COMPLETE CBC W/AUTO DIFF WBC: CPT

## 2022-03-16 PROCEDURE — 84590 ASSAY OF VITAMIN A: CPT

## 2022-03-16 PROCEDURE — 82607 VITAMIN B-12: CPT

## 2022-03-16 PROCEDURE — 84446 ASSAY OF VITAMIN E: CPT

## 2022-03-16 PROCEDURE — 83036 HEMOGLOBIN GLYCOSYLATED A1C: CPT

## 2022-03-16 PROCEDURE — 80061 LIPID PANEL: CPT

## 2022-03-16 PROCEDURE — 84443 ASSAY THYROID STIM HORMONE: CPT

## 2022-03-16 PROCEDURE — 83550 IRON BINDING TEST: CPT

## 2022-03-16 PROCEDURE — 85610 PROTHROMBIN TIME: CPT

## 2022-03-16 PROCEDURE — 80053 COMPREHEN METABOLIC PANEL: CPT

## 2022-03-16 PROCEDURE — 83540 ASSAY OF IRON: CPT

## 2022-03-17 LAB
ESTIMATED AVERAGE GLUCOSE: 108.3 MG/DL
HBA1C MFR BLD: 5.4 %

## 2022-03-20 LAB
ALPHA-TOCOPHEROL: 8.6 MG/L (ref 5.5–18)
GAMMA-TOCOPHEROL: 2.6 MG/L (ref 0–6)
RETINYL PALMITATE: <0.02 MG/L (ref 0–0.1)
VITAMIN A LEVEL: 0.42 MG/L (ref 0.3–1.2)
VITAMIN A, INTERP: NORMAL

## 2022-03-24 LAB — VITAMIN B1 WHOLE BLOOD: 123 NMOL/L (ref 70–180)

## 2022-03-24 ASSESSMENT — ENCOUNTER SYMPTOMS
EYES NEGATIVE: 1
SHORTNESS OF BREATH: 0
ALLERGIC/IMMUNOLOGIC NEGATIVE: 1
GASTROINTESTINAL NEGATIVE: 1
RESPIRATORY NEGATIVE: 1
COUGH: 0

## 2022-03-24 NOTE — PATIENT INSTRUCTIONS
Goals in preparing for bariatric surgery  You should be giving up all beverages that have carbonation, sugar, and caffeine (Refer to the approved liquids list provided at initial visit).  You should be drinking 64 ounces of low calorie (5 calories or less per serving) fluids per day. Suggestions include:  o Water (you may add fresh lemon or lime)  o Crystal Light  o Bryan Liquid Water Enhancer  o Propel Zero  o Powerade Zero/Gatorade Zero  o Isopure  o [id number]  o SOBE Lifewater Zero  o Vitamin Water Zero  o Sugar Free Gonzalo-Aid  You should be eating 4-6 times per day.  Three small meals plus 1-2 snacks per day is your goal. This balances your calories and nutrients evenly throughout the day and helps to boost your metabolism. Refer to the snack list provided at your initial visit. Aim for a protein at every snack, plus a fruit, vegetable or starch. You should be eating protein at every meal and snack.  Protein is typically found in animal sources, i.e. chicken, lean beef, lean pork, fish, seafood and eggs. It is also found in low-fat dairy sources such as skim or 1% milk, low-fat yogurt, low-fat cheese, and low-fat cottage cheese. Plant based sources of protein include peanut butter, beans, and soy. You should be utilizing the 9-inch plate method.  Eating on a smaller plate will help you control portion size, but what you put on your plate counts also. Make ¼ of your plate lean protein, ¼ carbohydrate (fruit, grain or starchy vegetables) and ½ the plate non-starchy vegetables. You should eliminate caffeine.  Caffeine is dehydrating. After surgery, it's very important to stay hydrated. Giving up caffeine before surgery will help you focus on the changes necessary to be successful after surgery. There are many decaffeinated coffee and tea products available in grocery stores. You should eliminate alcohol.    You must abstain from alcohol prior to surgery and for at least the first 6-9 months after surgery. Although you may have alcohol in the future it is important to understand, as a bariatric surgery patient, there is a risk of it negatively affecting your weight loss goals and it can pose a risk for addiction. You should be reducing added fat and sugar in your diet.  Frying foods adds too much fat and calories, but you could use an air fryer as it requires significantly less oil. Baking, broiling, or grilling meats add flavor without unhealthy fats. Using cooking oil spray and spray butter products are also healthy options that will aid in your weight loss. Foods high in added sugars are often also high in calories and low in nutrients. If you are a smoker or use e-cigarettes/vaping, you must eliminate.  You must be nicotine free and/or not vaping for at least 8 weeks before your surgery can be scheduled. You will be screened for nicotine through lab work. Eating habits after surgery need to be a long-term change. Eating habits are often so ingrained that it can be difficult to change. It is important to practice new eating habits prior to surgery to mentally prepare yourself for the challenge ahead. Also, remember that overall health, age, and genetics make each person's weight loss progress different. Do not compare your progress (pre- or post-operatively), the amount you eat, or your exercise to other patients. In addition, it is the responsibility of the patient to schedule and follow up on labs and tests completed during the pre-surgical period. Results will be reviewed at each visit. **IT IS IMPORTANT TO KNOW THAT YOU MUST COMPLETE ALL REQUIRED DIETARY CHANGES, TESTS, CLEARANCES AND ACTIVITIES BEFORE A SURGERY DATE CAN BE GIVEN. IF YOU HAVE NOT MET ANY OF THESE REQUIREMENTS THEN YOU WILL NOT BE GIVEN A SURGERY DATE UNTIL THEY HAVE BEEN MET TO OUR SATISFACTION.  THIS IS NOT ONLY DONE TO HELP YOU BE SUCCESSFUL AFTER SURGERY, BUT TO BE SAFE AS WELL.**  Patient received dietary handouts and education.

## 2022-03-24 NOTE — PROGRESS NOTES
[facility]) Physicians   [facility]    Subjective:      Patient ID: Mary Anne Abreu is a 28 y.o. female    HPI    The patient is here for their bariatric surgery presurgical visit for future weight loss. She has made several attempts at weight loss in the past without success and now wishes to pursue bariatric surgery. She is working to change her dietary behaviors and lose weight to improve comorbid conditions such as hypertension and GERD. Israel Macario is a 28 y.o. female with Body mass index is 48.94 kg/m². Past Medical History:   Diagnosis Date    Abnormal heart rhythm     irregular heart rate    Abnormal Pap smear of cervix 2015    LEEP    H/o Molar pregnancy     Hypertension     Hypoglycemia     Mental disorder     on meds in high school for bipolar, not currently taking medications    Uterus bicornis affecting pregnancy      Past Surgical History:   Procedure Laterality Date    APPENDECTOMY      CHOLECYSTECTOMY      DILATION AND CURETTAGE OF UTERUS      LEEP      TONSILLECTOMY  2016    UPPER GASTROINTESTINAL ENDOSCOPY N/A 10/22/2021    EGD BIOPSY performed by Lida Field MD at [address] [place] [address]     Family History   Problem Relation Age of Onset    Diabetes Mother      Social History     Tobacco Use    Smoking status: Former Smoker     Packs/day: 0.50     Types: Cigarettes     Quit date: 10/24/2016     Years since quittin.4    Smokeless tobacco: Former User     Quit date: 10/29/2016   Substance Use Topics    Alcohol use: No     I counseled the patient on the importance of not smoking and risks of ETOH. Allergies   Allergen Reactions    Latex Rash     Vitals:    22 1453   BP: 132/73   Pulse: 69   Weight: 259 lb (117.5 kg)   Height: 5' 1\" (1.549 m)     Body mass index is 48.94 kg/m².     Current Outpatient Medications:     metoprolol tartrate (LOPRESSOR) 25 MG tablet, Take 25 mg by mouth 2 times daily, Disp: , Rfl:     omeprazole (PRILOSEC) 20 MG delayed release capsule, Take 1 capsule by mouth Daily, Disp: 30 capsule, Rfl: 3    Lab Results   Component Value Date    WBC 7.3 03/16/2022    RBC 4.50 03/16/2022    HGB 13.2 03/16/2022    HCT 40.0 03/16/2022    MCV 88.9 03/16/2022    MCH 29.5 03/16/2022    MCHC 33.1 03/16/2022    MPV 9.4 03/16/2022    NEUTOPHILPCT 58.0 03/16/2022    LYMPHOPCT 29.8 03/16/2022    MONOPCT 9.2 03/16/2022    EOSRELPCT 2.4 03/16/2022    BASOPCT 0.6 03/16/2022    NEUTROABS 4.2 03/16/2022    NEUTROABS 5.40 11/16/2016    LYMPHSABS 2.2 03/16/2022    MONOSABS 0.7 03/16/2022    EOSABS 0.2 03/16/2022     Lab Results   Component Value Date     03/16/2022    K 4.3 03/16/2022    K 3.6 08/04/2021     03/16/2022    CO2 19 03/16/2022    ANIONGAP 15 03/16/2022    GLUCOSE 93 03/16/2022    BUN 11 03/16/2022    CREATININE 0.5 03/16/2022    LABGLOM >60 03/16/2022    GFRAA >60 03/16/2022    CALCIUM 9.5 03/16/2022    PROT 7.5 03/16/2022    LABALBU 4.4 03/16/2022    AGRATIO 1.4 03/16/2022    BILITOT 0.4 03/16/2022    ALKPHOS 87 03/16/2022    ALT 53 03/16/2022    AST 31 03/16/2022    GLOB 3.5 08/04/2021     Lab Results   Component Value Date    CHOL 165 03/16/2022    TRIG 80 03/16/2022    HDL 44 03/16/2022    LDLCALC 105 03/16/2022    LABVLDL 16 03/16/2022     Lab Results   Component Value Date    TSHREFLEX 1.89 03/16/2022     Lab Results   Component Value Date    IRON 89 03/16/2022    TIBC 354 03/16/2022    LABIRON 25 03/16/2022     Lab Results   Component Value Date    OBCTSBJJ62 573 03/16/2022    FOLATE 7.52 03/16/2022     Lab Results   Component Value Date    VITD25 18.7 03/16/2022     Lab Results   Component Value Date    LABA1C 5.4 03/16/2022    .3 03/16/2022       Review of Systems   Constitutional: Negative. Negative for chills, fatigue and fever. HENT: Negative. Eyes: Negative. Respiratory: Negative. Negative for cough and shortness of breath. Cardiovascular: Negative. Gastrointestinal: Negative. Endocrine: Negative. Genitourinary: Negative. Musculoskeletal: Negative. Skin: Negative. Allergic/Immunologic: Negative. Neurological: Negative. Hematological: Negative. Psychiatric/Behavioral: Negative. Objective:   Physical Exam  Vitals reviewed. Constitutional:       Appearance: She is well-developed. HENT:      Head: Normocephalic and atraumatic. Eyes:      Conjunctiva/sclera: Conjunctivae normal.      Pupils: Pupils are equal, round, and reactive to light. Cardiovascular:      Rate and Rhythm: Normal rate. Pulmonary:      Effort: Pulmonary effort is normal.   Abdominal:      Palpations: Abdomen is soft. Musculoskeletal:         General: Normal range of motion. Cervical back: Normal range of motion and neck supple. Skin:     General: Skin is warm and dry. Neurological:      Mental Status: She is alert and oriented to person, place, and time. Psychiatric:         Behavior: Behavior normal.         Thought Content: Thought content normal.         Judgment: Judgment normal.       Assessment and Plan:   Patient is here for their 3rd presurgery visit for sleeve, down 1 lbs. The patient's current Body mass index is 48.94 kg/m². (3/28/22). She is making much better dietary and behavior modifications. She did meet with the registered dietitian for continued follow up. Discussed with dietitian and I agree with recommendations and plan. She is exercising with going to [employer] 3-4 days per week doing cardio and strength training. Encouraged continued physical activity. Patient received instruction that it is recommended to avoid pregnancy following bariatric surgery for at least 2 years to allow them to have stable weight loss and to help avoid increased risk of vitamin deficiencies and malnutrition. The patient was encouraged to discuss possible contraceptive methods with their PCP or OBGYN.  Patient counseled on the avoidance of tobacco/nicotine, alcohol and provider notes, outside hospital records, performing examination/evaluation, counseling patient and/or family, ordering medications/tests, placing referrals and communication with referring physicians, coordination of care; discussing exercise and physical activity; discussing dietary plan/recall with the patient as well with registered dietitian and documentation in the EHR. Of note, the above was done during same day of the actual patient encounter.

## 2022-03-28 ENCOUNTER — OFFICE VISIT (OUTPATIENT)
Dept: BARIATRICS/WEIGHT MGMT | Age: 32
End: 2022-03-28
Payer: MEDICAID

## 2022-03-28 VITALS
SYSTOLIC BLOOD PRESSURE: 132 MMHG | WEIGHT: 259 LBS | HEIGHT: 61 IN | BODY MASS INDEX: 48.9 KG/M2 | HEART RATE: 69 BPM | DIASTOLIC BLOOD PRESSURE: 73 MMHG

## 2022-03-28 DIAGNOSIS — I10 ESSENTIAL HYPERTENSION: ICD-10-CM

## 2022-03-28 DIAGNOSIS — E55.9 VITAMIN D DEFICIENCY: ICD-10-CM

## 2022-03-28 DIAGNOSIS — K21.9 CHRONIC GERD: Primary | ICD-10-CM

## 2022-03-28 DIAGNOSIS — E66.01 MORBID OBESITY WITH BMI OF 45.0-49.9, ADULT (HCC): ICD-10-CM

## 2022-03-28 PROCEDURE — 1036F TOBACCO NON-USER: CPT | Performed by: NURSE PRACTITIONER

## 2022-03-28 PROCEDURE — 99214 OFFICE O/P EST MOD 30 MIN: CPT | Performed by: NURSE PRACTITIONER

## 2022-03-28 PROCEDURE — G8427 DOCREV CUR MEDS BY ELIG CLIN: HCPCS | Performed by: NURSE PRACTITIONER

## 2022-03-28 PROCEDURE — G8417 CALC BMI ABV UP PARAM F/U: HCPCS | Performed by: NURSE PRACTITIONER

## 2022-03-28 PROCEDURE — G8484 FLU IMMUNIZE NO ADMIN: HCPCS | Performed by: NURSE PRACTITIONER

## 2022-03-28 RX ORDER — ERGOCALCIFEROL 1.25 MG/1
50000 CAPSULE ORAL WEEKLY
Qty: 4 CAPSULE | Refills: 2 | Status: SHIPPED | OUTPATIENT
Start: 2022-03-28 | End: 2022-06-01 | Stop reason: ALTCHOICE

## 2022-03-28 NOTE — PROGRESS NOTES
Israel Tay lost 1 lbs over 6 weeks. Since last appointment patient attended a support group. She has started using track intake on [employer], but only on computer. Could not access information on her phone, getting a new phone tomorrow. Breakfast: cheerios with almond milk    Snack: pb crax    Lunch: salad with grilled fish, tomatoes, lite cheese, lemon juice, 1 HB egg    Snack: protein shake mixed with water    Dinner: baked potato with sour cream and chives, small side salad with hb egg, tomato, lite cheese and lemon squeeze, side of baby carrots     Snack: none    Is pt eating at least 4 times everyday? yes 4-5, includes snacks & meal replacement    Is pt eating a lean protein source with all meals and snacks? yes she is     Has pt decreased their portions using the plate method? yes smaller plate    Is pt choosing low fat/sugar free options? yes omitted high fat condiments, baking instead of frying    Is pt drinking at least 64 oz of clear liquids everyday? yes water, CL, gatorade zero. decaf unsweet herbal     Has pt stopped drinking carbonation, caffeinated, and sugar sweetened beverages? eliminated     Has pt sampled Unjury and/or Nectar protein? yes multiple flavors    Participating in intentional exercise? yes PF 3-4 times per week     Plan/Recommendations:focus on protein and produce.  Handouts: plate method & [address]ie meal plan      Isaac Gutierrez RD, LD

## 2022-04-21 ASSESSMENT — ENCOUNTER SYMPTOMS
SHORTNESS OF BREATH: 0
COUGH: 0
ALLERGIC/IMMUNOLOGIC NEGATIVE: 1
EYES NEGATIVE: 1
RESPIRATORY NEGATIVE: 1
GASTROINTESTINAL NEGATIVE: 1

## 2022-04-21 NOTE — PATIENT INSTRUCTIONS
Goals in preparing for bariatric surgery  You should be giving up all beverages that have carbonation, sugar, and caffeine (Refer to the approved liquids list provided at initial visit).  You should be drinking 64 ounces of low calorie (5 calories or less per serving) fluids per day. Suggestions include:  o Water (you may add fresh lemon or lime)  o Crystal Light  o Bryan Liquid Water Enhancer  o Propel Zero  o Powerade Zero/Gatorade Zero  o Isopure  o [id number]  o SOBE Lifewater Zero  o Vitamin Water Zero  o Sugar Free Gonzalo-Aid  You should be eating 4-6 times per day.  Three small meals plus 1-2 snacks per day is your goal. This balances your calories and nutrients evenly throughout the day and helps to boost your metabolism. Refer to the snack list provided at your initial visit. Aim for a protein at every snack, plus a fruit, vegetable or starch. You should be eating protein at every meal and snack.  Protein is typically found in animal sources, i.e. chicken, lean beef, lean pork, fish, seafood and eggs. It is also found in low-fat dairy sources such as skim or 1% milk, low-fat yogurt, low-fat cheese, and low-fat cottage cheese. Plant based sources of protein include peanut butter, beans, and soy. You should be utilizing the 9-inch plate method.  Eating on a smaller plate will help you control portion size, but what you put on your plate counts also. Make ¼ of your plate lean protein, ¼ carbohydrate (fruit, grain or starchy vegetables) and ½ the plate non-starchy vegetables. You should eliminate caffeine.  Caffeine is dehydrating. After surgery, it's very important to stay hydrated. Giving up caffeine before surgery will help you focus on the changes necessary to be successful after surgery. There are many decaffeinated coffee and tea products available in grocery stores. You should eliminate alcohol.    You must abstain from alcohol prior to surgery and for at least the first 6-9 months after surgery. Although you may have alcohol in the future it is important to understand, as a bariatric surgery patient, there is a risk of it negatively affecting your weight loss goals and it can pose a risk for addiction. You should be reducing added fat and sugar in your diet.  Frying foods adds too much fat and calories, but you could use an air fryer as it requires significantly less oil. Baking, broiling, or grilling meats add flavor without unhealthy fats. Using cooking oil spray and spray butter products are also healthy options that will aid in your weight loss. Foods high in added sugars are often also high in calories and low in nutrients. If you are a smoker or use e-cigarettes/vaping, you must eliminate.  You must be nicotine free and/or not vaping for at least 8 weeks before your surgery can be scheduled. You will be screened for nicotine through lab work. Eating habits after surgery need to be a long-term change. Eating habits are often so ingrained that it can be difficult to change. It is important to practice new eating habits prior to surgery to mentally prepare yourself for the challenge ahead. Also, remember that overall health, age, and genetics make each person's weight loss progress different. Do not compare your progress (pre- or post-operatively), the amount you eat, or your exercise to other patients. In addition, it is the responsibility of the patient to schedule and follow up on labs and tests completed during the pre-surgical period. Results will be reviewed at each visit. **IT IS IMPORTANT TO KNOW THAT YOU MUST COMPLETE ALL REQUIRED DIETARY CHANGES, TESTS, CLEARANCES AND ACTIVITIES BEFORE A SURGERY DATE CAN BE GIVEN. IF YOU HAVE NOT MET ANY OF THESE REQUIREMENTS THEN YOU WILL NOT BE GIVEN A SURGERY DATE UNTIL THEY HAVE BEEN MET TO OUR SATISFACTION.  THIS IS NOT ONLY DONE TO HELP YOU BE SUCCESSFUL AFTER SURGERY, BUT TO BE SAFE AS WELL.**    Patient received dietary handouts and education.

## 2022-04-21 NOTE — PROGRESS NOTES
[facility]) Physicians   [facility]    Subjective:      Patient ID: Bre Kaye is a 28 y.o. female    HPI    The patient is here for their bariatric surgery presurgical visit for future weight loss. She has made several attempts at weight loss in the past without success and now wishes to pursue bariatric surgery. She is working to change her dietary behaviors and lose weight to improve comorbid conditions such as hypertension, diabetes mellitus and GERD. Israel Moore is a 28 y.o. female with Body mass index is 49.5 kg/m². Past Medical History:   Diagnosis Date    Abnormal heart rhythm     irregular heart rate    Abnormal Pap smear of cervix 2015    LEEP    H/o Molar pregnancy     Hypertension     Hypoglycemia     Mental disorder     on meds in high school for bipolar, not currently taking medications    Uterus bicornis affecting pregnancy      Past Surgical History:   Procedure Laterality Date    APPENDECTOMY  2015    CHOLECYSTECTOMY  2016    DILATION AND CURETTAGE OF UTERUS      LEEP      TONSILLECTOMY  2016    UPPER GASTROINTESTINAL ENDOSCOPY N/A 10/22/2021    EGD BIOPSY performed by Neihsa Matson MD at [address]     Family History   Problem Relation Age of Onset    Diabetes Mother      Social History     Tobacco Use    Smoking status: Former Smoker     Packs/day: 0.50     Types: Cigarettes     Quit date: 10/24/2016     Years since quittin.5    Smokeless tobacco: Former User     Quit date: 10/29/2016   Substance Use Topics    Alcohol use: No     I counseled the patient on the importance of not smoking and risks of ETOH. Allergies   Allergen Reactions    Latex Rash     Vitals:    22 1448 22 1527   BP: (!) 151/97 122/79   Site:  Left Wrist   Position:  Sitting   Pulse: 84 74   Weight: 262 lb (118.8 kg)    Height: 5' 1\" (1.549 m)      Body mass index is 49.5 kg/m².     Current Outpatient Medications:     vitamin D Constitutional: Negative. Negative for chills, fatigue and fever. HENT: Negative. Eyes: Negative. Respiratory: Negative. Negative for cough and shortness of breath. Cardiovascular: Negative. Gastrointestinal: Negative. Endocrine: Negative. Genitourinary: Negative. Musculoskeletal: Negative. Skin: Negative. Allergic/Immunologic: Negative. Neurological: Negative. Hematological: Negative. Psychiatric/Behavioral: Negative. Objective:   Physical Exam  Vitals reviewed. Constitutional:       Appearance: She is well-developed. HENT:      Head: Normocephalic and atraumatic. Eyes:      Conjunctiva/sclera: Conjunctivae normal.      Pupils: Pupils are equal, round, and reactive to light. Cardiovascular:      Rate and Rhythm: Normal rate. Pulmonary:      Effort: Pulmonary effort is normal.   Abdominal:      Palpations: Abdomen is soft. Musculoskeletal:         General: Normal range of motion. Cervical back: Normal range of motion and neck supple. Skin:     General: Skin is warm and dry. Neurological:      Mental Status: She is alert and oriented to person, place, and time. Psychiatric:         Behavior: Behavior normal.         Thought Content: Thought content normal.         Judgment: Judgment normal.       Assessment and Plan:   Patient is here for their 4th presurgery visit for sleeve, up 3 lbs. The patient's current Body mass index is 49.5 kg/m². (4/25/22). She is making good dietary and behavior modifications. She did meet with the registered dietitian for continued follow up. Discussed with dietitian and I agree with recommendations and plan. She is exercising with walking, but less at [employer] due to dealing with her kids medical issues. Encouraged physical activity as able.  Patient received instruction that it is recommended to avoid pregnancy following bariatric surgery for at least 2 years to allow them to have stable weight loss and to help avoid increased risk of vitamin deficiencies and malnutrition. The patient was encouraged to discuss possible contraceptive methods with their PCP or OBGYN. Patient counseled on the avoidance of tobacco/nicotine, alcohol and illicit drug abuse. Discussed preop work up which still needs PCP letter and drug/alcohol/nicotine screening. We will see her back in 1 month for continued follow up. Essential Hypertension:  [x] Continue to make dietary and lifestyle modifications per our recommendations. [x] Reviewed the importance of checking blood pressure. [x] Continue to follow up with their PCP for medication management (Lopressor) and monitoring. [] Follow up labs ordered today. Diabetes Mellitus Type II in Obese:   [x] Continue to make dietary and lifestyle modifications per our recommendations. [] Reviewed the importance of checking blood sugars. [] Continue to follow up with their PCP and/or Endocrinologist for medication management and monitoring. [] Follow up labs ordered today. Chronic GERD:   [x] Continue to make dietary and lifestyle modifications per our recommendations. [x] Continue PPI (Prilsoec). [] Continue H2 Blocker  [] Wean PPI. Take every other day for two weeks. If no issues with heartburn/reflux you may decrease to every third day for two weeks. If no issues with heartburn/reflux you may stop the Prilosec. Recommend that you get OTC Pepcid to take should you have occasional heartburn/reflux. Vitamin D Deficiency:  [x] Continue to make dietary and lifestyle modifications per our recommendations. [x] Continue to take Vitamin D supplements. [] Continue to take multivitamins. [] Follow up labs ordered today. Obesity:  [x] Continue to make dietary and lifestyle modifications per our recommendations. [x] Plan for Future laparoscopic sleeve gastrectomy. [x] Return for follow-up next month.     Total encounter time: 30 minutes, including any number of the following: Bariatric Preoperative work up/protocols, review of labs, imaging, provider notes, outside hospital records, performing examination/evaluation, counseling patient and/or family, ordering medications/tests, placing referrals and communication with referring physicians, coordination of care; discussing exercise and physical activity; discussing dietary plan/recall with the patient as well with registered dietitian and documentation in the EHR. Of note, the above was done during same day of the actual patient encounter.

## 2022-04-25 ENCOUNTER — OFFICE VISIT (OUTPATIENT)
Dept: BARIATRICS/WEIGHT MGMT | Age: 32
End: 2022-04-25
Payer: MEDICAID

## 2022-04-25 VITALS
HEIGHT: 61 IN | HEART RATE: 74 BPM | SYSTOLIC BLOOD PRESSURE: 122 MMHG | DIASTOLIC BLOOD PRESSURE: 79 MMHG | WEIGHT: 262 LBS | BODY MASS INDEX: 49.47 KG/M2

## 2022-04-25 DIAGNOSIS — K21.9 CHRONIC GERD: ICD-10-CM

## 2022-04-25 DIAGNOSIS — E55.9 VITAMIN D DEFICIENCY: ICD-10-CM

## 2022-04-25 DIAGNOSIS — E66.01 MORBID OBESITY WITH BMI OF 45.0-49.9, ADULT (HCC): ICD-10-CM

## 2022-04-25 DIAGNOSIS — I10 ESSENTIAL HYPERTENSION: Primary | ICD-10-CM

## 2022-04-25 PROCEDURE — G8427 DOCREV CUR MEDS BY ELIG CLIN: HCPCS | Performed by: NURSE PRACTITIONER

## 2022-04-25 PROCEDURE — 99214 OFFICE O/P EST MOD 30 MIN: CPT | Performed by: NURSE PRACTITIONER

## 2022-04-25 PROCEDURE — 1036F TOBACCO NON-USER: CPT | Performed by: NURSE PRACTITIONER

## 2022-04-25 PROCEDURE — G8417 CALC BMI ABV UP PARAM F/U: HCPCS | Performed by: NURSE PRACTITIONER

## 2022-04-25 NOTE — PROGRESS NOTES
Israel Tay gained 3 lbs over past month. Pt provided with 1200 kcal meal plan last visit and has been using. Pt inquiring about foods that contain salt as she feels her blood pressure has been high lately. Is pt eating at least 4 times everyday? Yes     Is pt eating a lean protein source with all meals and snacks? Yes but not always lean   B - omelette with onions/cheese/peppers   S - yogurt with cup of raspberries   L - baked salami with asparagus and potatoes   D - stuffed peppers     Has pt decreased their portions using the plate method? Yes     Is pt choosing low fat/sugar free options? Mostly but having salami      Is pt drinking at least 64 oz of clear liquids everyday? Yes    Has pt stopped drinking carbonation, caffeinated, and sugar sweetened beverages? Yes - Drinks water, crystal light, gatorade zero, decaf unsweetened herbal tea, 1% lactaid milk    Has pt sampled Unjury and/or Nectar protein? Yes    Has pt attended a support group? Completed    Participating in intentional exercise? Not going to gym currently d/t kids busy schedules and therapies. Walking 2 hours most days back and forth to [employer].      Plan/Recommendations:   Avoid high fat meats - salami   Continue with staying active     Handouts: none     Abby Flores, GIACOMO, LD

## 2022-05-05 ASSESSMENT — ENCOUNTER SYMPTOMS
ALLERGIC/IMMUNOLOGIC NEGATIVE: 1
GASTROINTESTINAL NEGATIVE: 1
COUGH: 0
RESPIRATORY NEGATIVE: 1
SHORTNESS OF BREATH: 0
EYES NEGATIVE: 1

## 2022-05-05 NOTE — PATIENT INSTRUCTIONS
Goals in preparing for bariatric surgery  You should be giving up all beverages that have carbonation, sugar, and caffeine (Refer to the approved liquids list provided at initial visit).  You should be drinking 64 ounces of low calorie (5 calories or less per serving) fluids per day. Suggestions include:  o Water (you may add fresh lemon or lime)  o Crystal Light  o Bryan Liquid Water Enhancer  o Propel Zero  o Powerade Zero/Gatorade Zero  o Isopure  o Jaedv2C  o SOBE Lifewater Zero  o Vitamin Water Zero  o Sugar Free Gonzalo-Aid  You should be eating 4-6 times per day.  Three small meals plus 1-2 snacks per day is your goal. This balances your calories and nutrients evenly throughout the day and helps to boost your metabolism. Refer to the snack list provided at your initial visit. Aim for a protein at every snack, plus a fruit, vegetable or starch. You should be eating protein at every meal and snack.  Protein is typically found in animal sources, i.e. chicken, lean beef, lean pork, fish, seafood and eggs. It is also found in low-fat dairy sources such as skim or 1% milk, low-fat yogurt, low-fat cheese, and low-fat cottage cheese. Plant based sources of protein include peanut butter, beans, and soy. You should be utilizing the 9-inch plate method.  Eating on a smaller plate will help you control portion size, but what you put on your plate counts also. Make ¼ of your plate lean protein, ¼ carbohydrate (fruit, grain or starchy vegetables) and ½ the plate non-starchy vegetables. You should eliminate caffeine.  Caffeine is dehydrating. After surgery, it's very important to stay hydrated. Giving up caffeine before surgery will help you focus on the changes necessary to be successful after surgery. There are many decaffeinated coffee and tea products available in grocery stores. You should eliminate alcohol.    You must abstain from alcohol prior to surgery and for at least the first 6-9 months after surgery. Although you may have alcohol in the future it is important to understand, as a bariatric surgery patient, there is a risk of it negatively affecting your weight loss goals and it can pose a risk for addiction. You should be reducing added fat and sugar in your diet.  Frying foods adds too much fat and calories, but you could use an air fryer as it requires significantly less oil. Baking, broiling, or grilling meats add flavor without unhealthy fats. Using cooking oil spray and spray butter products are also healthy options that will aid in your weight loss. Foods high in added sugars are often also high in calories and low in nutrients. If you are a smoker or use e-cigarettes/vaping, you must eliminate.  You must be nicotine free and/or not vaping for at least 8 weeks before your surgery can be scheduled. You will be screened for nicotine through lab work. Eating habits after surgery need to be a long-term change. Eating habits are often so ingrained that it can be difficult to change. It is important to practice new eating habits prior to surgery to mentally prepare yourself for the challenge ahead. Also, remember that overall health, age, and genetics make each person's weight loss progress different. Do not compare your progress (pre- or post-operatively), the amount you eat, or your exercise to other patients. In addition, it is the responsibility of the patient to schedule and follow up on labs and tests completed during the pre-surgical period. Results will be reviewed at each visit. **IT IS IMPORTANT TO KNOW THAT YOU MUST COMPLETE ALL REQUIRED DIETARY CHANGES, TESTS, CLEARANCES AND ACTIVITIES BEFORE A SURGERY DATE CAN BE GIVEN. IF YOU HAVE NOT MET ANY OF THESE REQUIREMENTS THEN YOU WILL NOT BE GIVEN A SURGERY DATE UNTIL THEY HAVE BEEN MET TO OUR SATISFACTION.  THIS IS NOT ONLY DONE TO HELP YOU BE SUCCESSFUL AFTER SURGERY, BUT TO BE SAFE AS WELL.**    Patient received dietary handouts and education.     Plan/Recommendations:  - Space eating 3-4 hours apart  - Focus on seep hygiene 6-8 hours/night  - Increase pace/distane when waking

## 2022-05-05 NOTE — PROGRESS NOTES
[facility]) Physicians   [facility]    Subjective:      Patient ID: Danny Holbrook is a 28 y.o. female    HPI    The patient is here for their bariatric surgery presurgical visit for future weight loss. She has made several attempts at weight loss in the past without success and now wishes to pursue bariatric surgery. She is working to change her dietary behaviors and lose weight to improve comorbid conditions such as hypertension and GERD. Israel Lepe is a 28 y.o. female with Body mass index is 49.09 kg/m². Past Medical History:   Diagnosis Date    Abnormal heart rhythm     irregular heart rate    Abnormal Pap smear of cervix     LEEP    H/o Molar pregnancy     Hypertension     Hypoglycemia     Mental disorder     on meds in high school for bipolar, not currently taking medications    Uterus bicornis affecting pregnancy      Past Surgical History:   Procedure Laterality Date    APPENDECTOMY      CHOLECYSTECTOMY      DILATION AND CURETTAGE OF UTERUS      LEEP      TONSILLECTOMY  2016    UPPER GASTROINTESTINAL ENDOSCOPY N/A 10/22/2021    EGD BIOPSY performed by Jennifer Davis MD at [address] [place] [address]     Family History   Problem Relation Age of Onset    Diabetes Mother      Social History     Tobacco Use    Smoking status: Former Smoker     Packs/day: 0.50     Types: Cigarettes     Quit date: 10/24/2016     Years since quittin.5    Smokeless tobacco: Former User     Quit date: 10/29/2016   Substance Use Topics    Alcohol use: No     I counseled the patient on the importance of not smoking and risks of ETOH. Allergies   Allergen Reactions    Latex Rash     Vitals:    22 1437   BP: 136/84   Pulse: 69   Weight: 259 lb 12.8 oz (117.8 kg)   Height: 5' 1\" (1.549 m)     Body mass index is 49.09 kg/m².     Current Outpatient Medications:     vitamin D (ERGOCALCIFEROL) 1.25 MG (24242 UT) CAPS capsule, Take 1 capsule by mouth once a week, Disp: 4 capsule, Rfl: 2    metoprolol tartrate (LOPRESSOR) 25 MG tablet, Take 25 mg by mouth 2 times daily, Disp: , Rfl:     omeprazole (PRILOSEC) 20 MG delayed release capsule, Take 1 capsule by mouth Daily, Disp: 30 capsule, Rfl: 3    Lab Results   Component Value Date    WBC 7.3 03/16/2022    RBC 4.50 03/16/2022    HGB 13.2 03/16/2022    HCT 40.0 03/16/2022    MCV 88.9 03/16/2022    MCH 29.5 03/16/2022    MCHC 33.1 03/16/2022    MPV 9.4 03/16/2022    NEUTOPHILPCT 58.0 03/16/2022    LYMPHOPCT 29.8 03/16/2022    MONOPCT 9.2 03/16/2022    EOSRELPCT 2.4 03/16/2022    BASOPCT 0.6 03/16/2022    NEUTROABS 4.2 03/16/2022    NEUTROABS 5.40 11/16/2016    LYMPHSABS 2.2 03/16/2022    MONOSABS 0.7 03/16/2022    EOSABS 0.2 03/16/2022     Lab Results   Component Value Date     03/16/2022    K 4.3 03/16/2022    K 3.6 08/04/2021     03/16/2022    CO2 19 03/16/2022    ANIONGAP 15 03/16/2022    GLUCOSE 93 03/16/2022    BUN 11 03/16/2022    CREATININE 0.5 03/16/2022    LABGLOM >60 03/16/2022    GFRAA >60 03/16/2022    CALCIUM 9.5 03/16/2022    PROT 7.5 03/16/2022    LABALBU 4.4 03/16/2022    AGRATIO 1.4 03/16/2022    BILITOT 0.4 03/16/2022    ALKPHOS 87 03/16/2022    ALT 53 03/16/2022    AST 31 03/16/2022    GLOB 3.5 08/04/2021     Lab Results   Component Value Date    CHOL 165 03/16/2022    TRIG 80 03/16/2022    HDL 44 03/16/2022    LDLCALC 105 03/16/2022    LABVLDL 16 03/16/2022     Lab Results   Component Value Date    TSHREFLEX 1.89 03/16/2022     Lab Results   Component Value Date    IRON 89 03/16/2022    TIBC 354 03/16/2022    LABIRON 25 03/16/2022     Lab Results   Component Value Date    YQDKYDUP61 573 03/16/2022    FOLATE 7.52 03/16/2022     Lab Results   Component Value Date    VITD25 18.7 03/16/2022     Lab Results   Component Value Date    LABA1C 5.4 03/16/2022    .3 03/16/2022       Review of Systems   Constitutional: Negative. Negative for chills, fatigue and fever. HENT: Negative. Eyes: Negative. Respiratory: Negative. Negative for cough and shortness of breath. Cardiovascular: Negative. Gastrointestinal: Negative. Endocrine: Negative. Genitourinary: Negative. Musculoskeletal: Negative. Skin: Negative. Allergic/Immunologic: Negative. Neurological: Negative. Hematological: Negative. Psychiatric/Behavioral: Negative. Objective:   Physical Exam  Vitals reviewed. Constitutional:       Appearance: She is well-developed. HENT:      Head: Normocephalic and atraumatic. Eyes:      Conjunctiva/sclera: Conjunctivae normal.      Pupils: Pupils are equal, round, and reactive to light. Cardiovascular:      Rate and Rhythm: Normal rate. Pulmonary:      Effort: Pulmonary effort is normal.   Abdominal:      Palpations: Abdomen is soft. Musculoskeletal:         General: Normal range of motion. Cervical back: Normal range of motion and neck supple. Skin:     General: Skin is warm and dry. Neurological:      Mental Status: She is alert and oriented to person, place, and time. Psychiatric:         Behavior: Behavior normal.         Thought Content: Thought content normal.         Judgment: Judgment normal.       Assessment and Plan:   Patient is here for their 5th presurgery visit for sleeve, down 2.2 lbs. The patient's current Body mass index is 49.09 kg/m². (5/23/22). She is making good dietary and behavior modifications. She did meet with the registered dietitian for continued follow up. Discussed with dietitian and I agree with recommendations and plan. She is exercising with walking daily as weather permits. Encouraged continued physical activity. Patient received instruction that it is recommended to avoid pregnancy following bariatric surgery for at least 2 years to allow them to have stable weight loss and to help avoid increased risk of vitamin deficiencies and malnutrition.  The patient was encouraged to discuss possible contraceptive methods with their PCP or OBGYN. Patient counseled on the avoidance of tobacco/nicotine, alcohol and illicit drug abuse. Discussed preop work up which still needs PCP letter (Patient spoke with her yesterday and the doctor said she would complete it in the next couple days) and drug/alcohol/nicotine screening. If the patient is able to maintain consistency with their dietary behaviors and has completed the remainder of their preoperative requirements by their next visit they should be ready for a surgery date. We will see her back in 1 month for continued follow up. Essential Hypertension:  [x] Continue to make dietary and lifestyle modifications per our recommendations. [x] Reviewed the importance of checking blood pressure. [x] Continue to follow up with their PCP for medication management (Lopressor) and monitoring. [] Follow up labs ordered today. Chronic GERD:   [x] Continue to make dietary and lifestyle modifications per our recommendations. [x] Continue PPI (Prilosec). [] Continue H2 Blocker  [] Wean PPI. Take every other day for two weeks. If no issues with heartburn/reflux you may decrease to every third day for two weeks. If no issues with heartburn/reflux you may stop the Prilosec. Recommend that you get OTC Pepcid to take should you have occasional heartburn/reflux. Vitamin D Deficiency:  [x] Continue to make dietary and lifestyle modifications per our recommendations. [x] Continue to take Vitamin D supplements. [] Continue to take multivitamins. [] Follow up labs ordered today. Obesity:  [x] Continue to make dietary and lifestyle modifications per our recommendations. [x] Plan for Future laparoscopic sleeve gastrectomy. [x] Return for follow-up next month.     Total encounter time: 30 minutes, including any number of the following: Bariatric Preoperative work up/protocols, review of labs, imaging, provider notes, outside hospital records, performing examination/evaluation, counseling patient and/or family, ordering medications/tests, placing referrals and communication with referring physicians, coordination of care; discussing exercise and physical activity; discussing dietary plan/recall with the patient as well with registered dietitian and documentation in the EHR. Of note, the above was done during same day of the actual patient encounter.

## 2022-05-19 ENCOUNTER — HOSPITAL ENCOUNTER (OUTPATIENT)
Dept: NEUROLOGY | Age: 32
Discharge: HOME | End: 2022-05-19
Payer: MEDICAID

## 2022-05-19 DIAGNOSIS — G56.03 CARPAL TUNNEL SYNDROME, BILATERAL: ICD-10-CM

## 2022-05-19 PROCEDURE — 95886 MUSC TEST DONE W/N TEST COMP: CPT

## 2022-05-19 PROCEDURE — 95910 NRV CNDJ TEST 7-8 STUDIES: CPT

## 2022-05-19 NOTE — PROCEDURES
Test Date:  2022    Patient: Bonifacio Vernon : 1990 Physician: Jennifer Boyce DO   Sex: Female ID#:  Ref Phys: NOEMI Jenkins-CNP     Patient Complaints:  Patient is a 28year-old female who presents with pain weakness in the hands radiating to elbows. Onset one year ago, progressively worse. Patient History / Exam:  PMH: + cardiac ablation, no neck or arm surgery, No endocrine disease. PE:  reflexes trace, + thumb opposition weakness     NCV & EMG Findings:  Evaluation of the left median (APB) motor nerve showed prolonged distal onset latency (6.7 ms). The right median (APB) motor nerve showed prolonged distal onset latency (4.6 ms) and reduced amplitude (3.0 mV). The left median sensory nerve showed no response. The right median sensory nerve showed prolonged distal peak latency (3.9 ms), reduced amplitude (7 µV), and decreased conduction velocity (36 m/s). All remaining nerves (as indicated in the following tables) were within normal limits. All examined muscles (as indicated in the following table) showed no evidence of electrical instability. Impression:  Study is consistent with bilateral carpal tunnel syndrome, moderate severity. no evidence of an acute radiuclopathy or other entrapment neuropathy.           Jennifer Boyce DO        Nerve Conduction Studies  Motor Nerve Results      Latency Amplitude F-Lat Segment Distance CV Comment   Site (ms) Norm (mV) Norm (ms)  (cm) (m/s) Norm    Left Median (APB) Motor   Wrist 6.7  < 4.2 6.4  > 5.0         Elbow 9.8 - 4.1 -  Elbow-Wrist 16 52  > 50    Right Median (APB) Motor   Wrist 4.6  < 4.2 3.0  > 5.0         Elbow 8.0 - 3.1 -  Elbow-Wrist 18 53  > 50    Left Ulnar (ADM) Motor   Wrist 2.8  < 4.2 5.4  > 3.0         Bel Elbow 6.0 - 5.9 -  Bel Elbow-Wrist 20 63  > 50    Abv Elbow 6.8 - 4.5 -  Abv Elbow-Bel Elbow 4 50  > 48    Right Ulnar (ADM) Motor   Wrist 3.1  < 4.2 6.0  > 3.0         Bel Elbow 5.9 - 6.1 -  Bel Elbow-Wrist 20 71  > 50    Abv Elbow 7.1 - 4.9 -  Abv Elbow-Bel Elbow 7 58  > 48      Sensory Nerve Results      Latency (Peak) Amplitude (P-P) Segment Distance CV Comment   Site (ms) Norm (µV) Norm  (cm) (m/s) Norm    Left Median Sensory   Wrist-Dig II NR  < 3.6 NR  > 10 Wrist-Dig II 14 NR  > 39    Right Median Sensory   Wrist-Dig II 3.9  < 3.6 7  > 10 Wrist-Dig II 14 36  > 39    Left Ulnar Sensory   Wrist-Dig V 2.9  < 3.7 59  > 15 Wrist-Dig V 14 48  > 38    Right Ulnar Sensory   Wrist-Dig V 3.1  < 3.7 53  > 15 Wrist-Dig V 14 45  > 38        Electromyography     Side Muscle Nerve Root Ins Act Fibs Psw Amp Dur Poly Recrt Int Roland Glidden Comment   Right Deltoid Axillary C5-C6 Nml Nml Nml Nml Nml 0 Nml Nml    Right Biceps Musculocut C5-C6 Nml Nml Nml Nml Nml 0 Nml Nml    Right Triceps Radial C6-C8 Nml Nml Nml Nml Nml 0 Nml Nml    Right Brachiorad Radial C5-C6 Nml Nml Nml Nml Nml 0 Nml Nml    Right Pronator Teres Median C6-C7 Nml Nml Nml Nml Nml 0 Nml Nml    Right EIP Post Interosseous,  R... C7-C8 Nml Nml Nml Nml Nml 0 Nml Nml    Right APB Median C8-T1 Nml Nml Nml Nml Nml 0 Nml Nml    Right FDI Ulnar C8-T1 Nml Nml Nml Nml Nml 0 Nml Nml    Right Cervical Paraspinal (Uppe. .. Rami C1-C3 Nml Nml Nml         Right Cervical Paraspinal (Mid) Rami C4-C6 Nml Nml Nml         Right Cervical Paraspinal (Grey Jamie. .. Rami C7-C8 Nml Nml Nml         Left Deltoid Axillary C5-C6 Nml Nml Nml Nml Nml 0 Nml Nml    Left Biceps Musculocut C5-C6 Nml Nml Nml Nml Nml 0 Nml Nml    Left Triceps Radial C6-C8 Nml Nml Nml Nml Nml 0 Nml Nml    Left Brachiorad Radial C5-C6 Nml Nml Nml Nml Nml 0 Nml Nml    Left Pronator Teres Median C6-C7 Nml Nml Nml Nml Nml 0 Nml Nml    Left EIP Post Interosseous,  R... C7-C8 Nml Nml Nml Nml Nml 0 Nml Nml    Left APB Median C8-T1 Nml Nml Nml Nml Nml 0 Nml Nml    Left FDI Ulnar C8-T1 Nml Nml Nml Nml Nml 0 Nml Nml    Left Cervical Paraspinal (Uppe. ..  Rami C1-C3 Nml Nml Nml         Left Cervical Paraspinal (Mid) Rami C4-C6 Nml Nml Nml         Left Cervical Paraspinal (Keke Avenel. ..  Rami C7-C8 Nml Nml Nml             Electronically signed by Lachelle Tucker DO on 5/19/2022 at 12:45 PM

## 2022-05-23 ENCOUNTER — OFFICE VISIT (OUTPATIENT)
Dept: BARIATRICS/WEIGHT MGMT | Age: 32
End: 2022-05-23
Payer: MEDICAID

## 2022-05-23 VITALS
SYSTOLIC BLOOD PRESSURE: 136 MMHG | HEART RATE: 69 BPM | HEIGHT: 61 IN | BODY MASS INDEX: 49.05 KG/M2 | DIASTOLIC BLOOD PRESSURE: 84 MMHG | WEIGHT: 259.8 LBS

## 2022-05-23 DIAGNOSIS — K21.9 CHRONIC GERD: Primary | ICD-10-CM

## 2022-05-23 DIAGNOSIS — E66.01 MORBID OBESITY WITH BMI OF 45.0-49.9, ADULT (HCC): ICD-10-CM

## 2022-05-23 DIAGNOSIS — E55.9 VITAMIN D DEFICIENCY: ICD-10-CM

## 2022-05-23 DIAGNOSIS — I10 ESSENTIAL HYPERTENSION: ICD-10-CM

## 2022-05-23 PROCEDURE — G8427 DOCREV CUR MEDS BY ELIG CLIN: HCPCS | Performed by: NURSE PRACTITIONER

## 2022-05-23 PROCEDURE — G8417 CALC BMI ABV UP PARAM F/U: HCPCS | Performed by: NURSE PRACTITIONER

## 2022-05-23 PROCEDURE — 1036F TOBACCO NON-USER: CPT | Performed by: NURSE PRACTITIONER

## 2022-05-23 PROCEDURE — 99214 OFFICE O/P EST MOD 30 MIN: CPT | Performed by: NURSE PRACTITIONER

## 2022-05-23 NOTE — PROGRESS NOTES
Israel Tay lost 2.2 lbs over 1 month. Wake up 12 PM, varies based on son  Breakfast: 1-2 PM: HNC w/ 1% milk +apple + string cheese  Snack: 3 PM: celery + PB  Lunch: 4 PM: steak + broccoli + potato w/ cheese  Snack: None OR Protein shake made w/ milk or water  Dinner: 7-8 PM: Mac'n'cheese + baked fish + fruit salad   Snack: None  Bed around 3 AM or 6-7 AM    Is pt consuming smaller portions? yes using measuring utensils     Is pt consuming at least 64 oz of fluids per day? yes 5-6 bottles water, crystal light, gatorade zero, decaf unsweetened herbal tea, 1% lactaid milk    Is pt consuming carbonated, caffeinated, or sugary beverages? no    Has pt sampled Unjury and/or Nectar protein? Yes cookies'n'ceam, chocolate, vanilla      Has patient attended a support group?  Completed    Exercise: hiking [employer] 1.1 -1.5 miles 5-6X/week    Plan/Recommendations:  - Space eating 3-4 hours apart  - Focus on seep hygiene 6-8 hours/night  - Increase pace/distane when waking       Handouts: none    Jorje Solis, GIACOMO, LD

## 2022-05-26 ENCOUNTER — OFFICE VISIT (OUTPATIENT)
Dept: ORTHOPEDIC SURGERY | Age: 32
End: 2022-05-26
Payer: MEDICAID

## 2022-05-26 ENCOUNTER — TELEPHONE (OUTPATIENT)
Dept: ORTHOPEDIC SURGERY | Age: 32
End: 2022-05-26

## 2022-05-26 VITALS — HEIGHT: 61 IN | WEIGHT: 259 LBS | RESPIRATION RATE: 16 BRPM | BODY MASS INDEX: 48.9 KG/M2

## 2022-05-26 DIAGNOSIS — G56.03 CARPAL TUNNEL SYNDROME, BILATERAL: Primary | ICD-10-CM

## 2022-05-26 PROCEDURE — 99204 OFFICE O/P NEW MOD 45 MIN: CPT | Performed by: ORTHOPAEDIC SURGERY

## 2022-05-26 PROCEDURE — 1036F TOBACCO NON-USER: CPT | Performed by: ORTHOPAEDIC SURGERY

## 2022-05-26 PROCEDURE — G8427 DOCREV CUR MEDS BY ELIG CLIN: HCPCS | Performed by: ORTHOPAEDIC SURGERY

## 2022-05-26 PROCEDURE — G8417 CALC BMI ABV UP PARAM F/U: HCPCS | Performed by: ORTHOPAEDIC SURGERY

## 2022-05-26 NOTE — PROGRESS NOTES
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 Symptoms have been present for 1 year. The patient also frequently notices pain in the hand, wrist and arm, as well as weakness of , morning stiffness and swelling as well as difficulty performing functions which require fine touch. Symptoms are sometimes worse at night and can disturb sleep. The problem appears to recently have become worse. Conservative treatment has been prescribed(brace, NSAID). There is no history of wrist fracture. There is history and/or family history of diabetes. There is no history of thyroid disease. There is no family history of carpal tunnel syndrome. The pain assessment has been reviewed and is correct as stated. The patient's social history, past medical history, family history, medications, allergies and review of systems, entered 5/26/22, have been reviewed, and dated and are recorded in the chart. On examination the patient is Height: 5' 1\" (154.9 cm) tall and weighs Weight: 259 lb (117.5 kg). Respirations are 18 per minute. The patient is well nourished, is oriented to time and place, demonstrates appropriate mood and affect as well as normal gait and station. Cervical range of motion is normal for the patient's stated age and does not produce pain or paresthesias in either upper extremity. There is soft tissue swelling involving the volar aspect of the bilateral wrists  There is a mature healed scar in the proximal palm of the right hand secondary to a childhood laceration. There is mild bilateral thenar muscle atrophy. The Tinel sign is positive over the median nerve at the  carpal tunnel bilaterally and negative over the ulnar nerve at the elbow bilaterally. The Phalen test is positive on the left at 0 seconds.   There is hypalgesia, with associated dysesthesia, on sensory testing over the median nerve distribution. Two point discrimination is normal at the tip of the middle finger. Range of motion of the fingers, thumbs and wrists is normal.  Skin is intact without lesions. Distal circulation is normal.  All joints are stable. Fine motor coordination and gross muscle strength are normal. Deep tendon reflexes are brisk and present bilaterally. There are no subcutaneous masses or enlarged epitrochlear lymph nodes. I have personally reviewed and interpreted all previous external imaging studies, laboratory tests(CBC+diff, CMP, HbA1c, TSH), diagnostic procedures(EMG) and medical encounters pertinent to this patient's visit today. Review and independent interpretation of an external EMG study, dated 5/19/22 , preformed by Dr. Shaina Lawton, a physician outside of this office, is abnormal. There is severe left and moderate right carpal tunnel syndrome. The test results are shared with the patient. Impression:     Carpal tunnel syndrome, bilateral, with clinical evidence of nerve damage on the left. The nature of their medical problem is fully discussed with the patient, including all treatment options. Surgery is also discussed, including the possible risks, complications, prognosis and postoperative care. All questions are answered. The surgery consent form is explained and signed. Surgery will be scheduled and the patient is asked to call me if there are any additional questions. The patient understands that the surgery will be done by Dr. Jovany Vigil.

## 2022-05-26 NOTE — LETTER
CONSENT TO OPERATION  AND/OR OTHER PROCEDURE(S)          PATIENT : Vannessa Hernandez   YOB: 1990      DATE : 5/26/22          1. I request and consent that Dr. Melissa Walton,  and/or his associates or assistants perform an operation and/or procedures on the above patient at  [place] [address], to treat the condition(s) which appear indicated by the diagnostic studies already performed. I have been told that in general terms the nature, purpose and reasonable expectations of the operation and/or procedure(s) are:     Bilateral Carpal Tunnel Release, Left First      2. It has been explained to me by the informing physician that during the course of the operation and/or procedure(s) unforeseen conditions may be revealed that necessitate an extension of the original operation and/or procedure(s) or different operation and/or procedures than those set forth in Paragraph 1. I therefore authorize and request that my physician and/or his associates or assistants perform such operations and/or procedures as are necessary and desirable in the exercise of professional judgment. The authority granted under this Paragraph 2 shall extend to all conditions that require treatment and are known to my physician at the time the operation is commenced. 3. I have been made aware by the informing physician of certain risks and consequences that are associated with the operation and/or procedure(s) described in Paragraph 1, the reasonable alternative methods or treatment, the possible consequences, the possibility that the operation and/or procedure(s) may be unsuccessful and the possibility of complications.   I understand the reasonably known risks to be:      - Bleeding  - Infection  - Poor Healing  - Possible Damage to Nerve, Vessel, Tendon/Muscle or Bone  - Need for further Treatment/Surgery  - Stiffness  - Pain  - Residual or Recurrent Symptoms  - Anesthetic and/or Medical Risks  - We have Witness     Signature Of Patient         Date        Neisha Ana Licona                                                 Informing Physician                                           Signature of Informing Physician                              If patient is unable to sign or is a minor, complete one of the following:    (A)  Patient is a minor   years of age. (B)  Patient is unable to sign because: The undersigned represents that he or she is duly authorized to execute this consent for and on behalf of the above named patient. Witness               o  Parent  o  Guardian   o  Spouse       o  Other (specify)                                           Patient Name: Danny Holbrook  Patient YOB: 1990  Dr. Darion Cervantes' Return To Work Policy  Regarding your ability to return to work after surgery or injury, Dr. Darion Cervantes will not state that any patient is off of work or cannot work at all. He will place you on restrictions after your surgical procedure or injury. Depending on the details of your particular situation, Dr. Darion Cervantes may state that you will have either light use or no use of your hand for a specific number of weeks. It is your obligation to communicate with your employer regarding your restrictions. It is your employer's decision as to whether they will accommodate your restrictions (i.e. allow you to come to work in your restricted capacity) or to not allow you to return to work under your restrictions. Dr. Darion Cervantes does not participate in making this decision and cannot influence your employer regarding their decision. If you do not communicate your restrictions to your employer, or if you do not present to work as you are scheduled to, Dr. Darion Cervantes will not provide an 'excuse' to explain your absence. A doctors note, or official forms (BWC, FMLA, etc.) will be filled out, upon request, to indicate your date of surgery and your restrictions as stated above.   Dr. Darion Cervantes' Narcotic Policy  Patients will only be prescribed narcotics after surgical procedures or significant injury. Not all procedures cause pain great enough to require Narcotics and thus, not all patients will receive prescriptions after surgical procedures or injuries. Narcotics are never prescribed for chronic conditions. Narcotics are never prescribed for use longer than one week at a time. Refills are only granted in unusual circumstances and only at Dr. Alisha Rodriguez discretion. Patients who are receiving narcotic medication from another physician or who are under pain management contracts will not be given a prescription for narcotics for any reason. Surgery Arrival Time:  You have been advised of the START TIME for your surgery as well as the ARRIVAL TIME at which you need to arrive at the surgery facility. Please understand that there is a certain amount of preparation which takes place at the surgery facility prior to the start of your surgery. If you arrive later than your scheduled ARRIVAL TIME, it may be necessary to cancel your surgery for that day and reschedule your procedure due to lack of adequate time for pre-surgery preparations. Thank you for being on time for your arrival.    I have read the above policies and understand that by agreeing to proceed with treatment by Dr. Adi Butt and his team, that I am agreeing to abide by these policies.   Patient Name:  Paulita Dakin    Patient Signature:  _____________________________    Milagros Shelton Date:   5/26/22

## 2022-05-26 NOTE — TELEPHONE ENCOUNTER
CPT: [id number]  BODY PART: left wrist  STATUS: outpatient  LOCATION: [place]  AUTHORIZATION: [address] [place] [address]    Per [facility], [address] [place] [address]

## 2022-06-01 NOTE — PROGRESS NOTES
DOS    89    PATIENT HAS AN APPOINTMENT WITH HER PCP DR. Kaykay ELIZONDO  [address]  @ [phone]-PHONE # [phone]    PATIENT HAS CARDIAC CLEARANCE FROM DR. SAMPSON IN [facility] UNDER MEDIA    UPDATE: 2022  [facility]P IN [facility] UNDER MEDIA-NO MEDICAL CONTRAINDICATIONS TO PLANNED PROCEDURE AT THIS TIME PER H&P

## 2022-06-01 NOTE — PROGRESS NOTES
C-diff Questionnaire:     * Admitted with diarrhea? [] YES    [x]  NO     *Prior history of C-Diff. In last 3 months? [] YES    [x]  NO     *Antibiotic use in the past 6-8 weeks? [x]  NO    []  YES      If yes, which: REASON_________________     *Prior hospitalization or nursing home in the last month? []  YES    [x]  NO     SAFETY FIRST. .call before you fall    [address]  time__1:30PM__________        Surgery time___3PM_________    Do not eat or drink anything after 12:00 midnight prior to your surgery. Follow your MD/Surgeons pre-procedure instructions regarding your medicationsThis includes water chewing gum, mints and ice chips- the Day of Surgery. You may brush your teeth and gargle the morning of your surgery, but do not swallow the water     Please see your family doctor/pediatrician for a history and physical and/or questions concerning medications. Bring any test results/reports from your physicians office. If you are under the care of a heart doctor or specialist doctor, please be aware that you may be asked to them for clearance    You may be asked to stop blood thinners such as Coumadin, Plavix, Fragmin, Lovenox, etc., or any anti-inflammatories such as:  Aspirin, Ibuprofen, Advil, Naproxen prior to your surgery. We also ask that you stop any OTC medications such as fish oil, vitamin E, glucosamine, garlic, Multivitamins, COQ 10, etc. MAY TAKE TYLENOL    We ask that you do not smoke 24 hours prior to surgery  We ask that you do not  drink any alcoholic beverages 24 hours prior to surgery     You must make arrangements for a responsible adult to take you home after your surgery. For your safety you will not be allowed to leave alone or drive yourself home. Your surgery will be cancelled if you do not have a ride home.      Also for your safety, it is strongly suggested that someone stay with you the first 24 hours after [facility] phone number:  [address] PAT fax number:  [phone]    Please note these are generalized instructions for all surgical cases, you may be provided with more specific instructions according to your surgery.

## 2022-06-03 ENCOUNTER — TELEPHONE (OUTPATIENT)
Dept: ORTHOPEDIC SURGERY | Age: 32
End: 2022-06-03

## 2022-06-03 NOTE — TELEPHONE ENCOUNTER
CPT: [id number]  BODY PART: right wrist  STATUS: outpatient  LOCATION: [place]  AUTHORIZATION: [address] [place] [address]    Per [facility] [place], [address] [place] [address]

## 2022-06-06 ENCOUNTER — ANESTHESIA EVENT (OUTPATIENT)
Dept: OPERATING ROOM | Age: 32
End: 2022-06-06
Payer: MEDICAID

## 2022-06-07 ENCOUNTER — HOSPITAL ENCOUNTER (OUTPATIENT)
Age: 32
Setting detail: OUTPATIENT SURGERY
Discharge: HOME | End: 2022-06-07
Attending: ORTHOPAEDIC SURGERY | Admitting: ORTHOPAEDIC SURGERY
Payer: MEDICAID

## 2022-06-07 ENCOUNTER — ANESTHESIA (OUTPATIENT)
Dept: OPERATING ROOM | Age: 32
End: 2022-06-07
Payer: MEDICAID

## 2022-06-07 VITALS
TEMPERATURE: 97.1 F | SYSTOLIC BLOOD PRESSURE: 127 MMHG | BODY MASS INDEX: 49.26 KG/M2 | OXYGEN SATURATION: 97 % | RESPIRATION RATE: 16 BRPM | DIASTOLIC BLOOD PRESSURE: 65 MMHG | HEART RATE: 52 BPM | HEIGHT: 61 IN | WEIGHT: 260.91 LBS

## 2022-06-07 LAB — PREGNANCY, URINE: NEGATIVE

## 2022-06-07 PROCEDURE — 7100000001 HC PACU RECOVERY - ADDTL 15 MIN: Performed by: ORTHOPAEDIC SURGERY

## 2022-06-07 PROCEDURE — A4217 STERILE WATER/SALINE, 500 ML: HCPCS | Performed by: ORTHOPAEDIC SURGERY

## 2022-06-07 PROCEDURE — 64721 CARPAL TUNNEL SURGERY: CPT | Performed by: ORTHOPAEDIC SURGERY

## 2022-06-07 PROCEDURE — 7100000010 HC PHASE II RECOVERY - FIRST 15 MIN: Performed by: ORTHOPAEDIC SURGERY

## 2022-06-07 PROCEDURE — 2580000003 HC RX 258: Performed by: NURSE ANESTHETIST, CERTIFIED REGISTERED

## 2022-06-07 PROCEDURE — 6360000002 HC RX W HCPCS: Performed by: NURSE ANESTHETIST, CERTIFIED REGISTERED

## 2022-06-07 PROCEDURE — 7100000000 HC PACU RECOVERY - FIRST 15 MIN: Performed by: ORTHOPAEDIC SURGERY

## 2022-06-07 PROCEDURE — 2580000003 HC RX 258: Performed by: ANESTHESIOLOGY

## 2022-06-07 PROCEDURE — 2709999900 HC NON-CHARGEABLE SUPPLY: Performed by: ORTHOPAEDIC SURGERY

## 2022-06-07 PROCEDURE — 7100000011 HC PHASE II RECOVERY - ADDTL 15 MIN: Performed by: ORTHOPAEDIC SURGERY

## 2022-06-07 PROCEDURE — 2500000003 HC RX 250 WO HCPCS: Performed by: NURSE ANESTHETIST, CERTIFIED REGISTERED

## 2022-06-07 PROCEDURE — 2580000003 HC RX 258: Performed by: ORTHOPAEDIC SURGERY

## 2022-06-07 PROCEDURE — 84703 CHORIONIC GONADOTROPIN ASSAY: CPT

## 2022-06-07 PROCEDURE — 3600000015 HC SURGERY LEVEL 5 ADDTL 15MIN: Performed by: ORTHOPAEDIC SURGERY

## 2022-06-07 PROCEDURE — 3700000000 HC ANESTHESIA ATTENDED CARE: Performed by: ORTHOPAEDIC SURGERY

## 2022-06-07 PROCEDURE — 3600000005 HC SURGERY LEVEL 5 BASE: Performed by: ORTHOPAEDIC SURGERY

## 2022-06-07 PROCEDURE — 2500000003 HC RX 250 WO HCPCS: Performed by: ORTHOPAEDIC SURGERY

## 2022-06-07 PROCEDURE — 3700000001 HC ADD 15 MINUTES (ANESTHESIA): Performed by: ORTHOPAEDIC SURGERY

## 2022-06-07 PROCEDURE — 6370000000 HC RX 637 (ALT 250 FOR IP): Performed by: ANESTHESIOLOGY

## 2022-06-07 RX ORDER — HYDROCODONE BITARTRATE AND ACETAMINOPHEN 5; 325 MG/1; MG/1
1 TABLET ORAL
Status: COMPLETED | OUTPATIENT
Start: 2022-06-07 | End: 2022-06-07

## 2022-06-07 RX ORDER — SODIUM CHLORIDE 9 MG/ML
INJECTION, SOLUTION INTRAVENOUS CONTINUOUS PRN
Status: DISCONTINUED | OUTPATIENT
Start: 2022-06-07 | End: 2022-06-07 | Stop reason: SDUPTHER

## 2022-06-07 RX ORDER — SODIUM CHLORIDE 0.9 % (FLUSH) 0.9 %
5-40 SYRINGE (ML) INJECTION EVERY 12 HOURS SCHEDULED
Status: DISCONTINUED | OUTPATIENT
Start: 2022-06-07 | End: 2022-06-07 | Stop reason: HOSPADM

## 2022-06-07 RX ORDER — SODIUM CHLORIDE 9 MG/ML
INJECTION, SOLUTION INTRAVENOUS PRN
Status: DISCONTINUED | OUTPATIENT
Start: 2022-06-07 | End: 2022-06-07 | Stop reason: HOSPADM

## 2022-06-07 RX ORDER — SODIUM CHLORIDE 9 MG/ML
INJECTION, SOLUTION INTRAVENOUS CONTINUOUS
Status: DISCONTINUED | OUTPATIENT
Start: 2022-06-07 | End: 2022-06-07 | Stop reason: HOSPADM

## 2022-06-07 RX ORDER — SODIUM CHLORIDE 0.9 % (FLUSH) 0.9 %
5-40 SYRINGE (ML) INJECTION PRN
Status: DISCONTINUED | OUTPATIENT
Start: 2022-06-07 | End: 2022-06-07 | Stop reason: HOSPADM

## 2022-06-07 RX ORDER — LIDOCAINE HYDROCHLORIDE 20 MG/ML
INJECTION, SOLUTION EPIDURAL; INFILTRATION; INTRACAUDAL; PERINEURAL PRN
Status: DISCONTINUED | OUTPATIENT
Start: 2022-06-07 | End: 2022-06-07 | Stop reason: SDUPTHER

## 2022-06-07 RX ORDER — PROPOFOL 10 MG/ML
INJECTION, EMULSION INTRAVENOUS PRN
Status: DISCONTINUED | OUTPATIENT
Start: 2022-06-07 | End: 2022-06-07 | Stop reason: SDUPTHER

## 2022-06-07 RX ORDER — FENTANYL CITRATE 50 UG/ML
INJECTION, SOLUTION INTRAMUSCULAR; INTRAVENOUS PRN
Status: DISCONTINUED | OUTPATIENT
Start: 2022-06-07 | End: 2022-06-07 | Stop reason: SDUPTHER

## 2022-06-07 RX ORDER — MAGNESIUM HYDROXIDE 1200 MG/15ML
LIQUID ORAL CONTINUOUS PRN
Status: DISCONTINUED | OUTPATIENT
Start: 2022-06-07 | End: 2022-06-07 | Stop reason: HOSPADM

## 2022-06-07 RX ORDER — MIDAZOLAM HYDROCHLORIDE 1 MG/ML
INJECTION INTRAMUSCULAR; INTRAVENOUS PRN
Status: DISCONTINUED | OUTPATIENT
Start: 2022-06-07 | End: 2022-06-07 | Stop reason: SDUPTHER

## 2022-06-07 RX ADMIN — SODIUM CHLORIDE: 9 INJECTION, SOLUTION INTRAVENOUS at 14:31

## 2022-06-07 RX ADMIN — SODIUM CHLORIDE: 9 INJECTION, SOLUTION INTRAVENOUS at 13:06

## 2022-06-07 RX ADMIN — PROPOFOL 50 MG: 10 INJECTION, EMULSION INTRAVENOUS at 14:45

## 2022-06-07 RX ADMIN — PROPOFOL 50 MG: 10 INJECTION, EMULSION INTRAVENOUS at 14:38

## 2022-06-07 RX ADMIN — PROPOFOL 75 MG: 10 INJECTION, EMULSION INTRAVENOUS at 14:34

## 2022-06-07 RX ADMIN — MIDAZOLAM 1 MG: 1 INJECTION INTRAMUSCULAR; INTRAVENOUS at 14:31

## 2022-06-07 RX ADMIN — HYDROCODONE BITARTRATE AND ACETAMINOPHEN 1 TABLET: 5; 325 TABLET ORAL at 15:52

## 2022-06-07 RX ADMIN — PROPOFOL 50 MG: 10 INJECTION, EMULSION INTRAVENOUS at 14:36

## 2022-06-07 RX ADMIN — PROPOFOL 50 MG: 10 INJECTION, EMULSION INTRAVENOUS at 14:41

## 2022-06-07 RX ADMIN — FENTANYL CITRATE 50 MCG: 50 INJECTION INTRAMUSCULAR; INTRAVENOUS at 14:34

## 2022-06-07 RX ADMIN — FENTANYL CITRATE 50 MCG: 50 INJECTION INTRAMUSCULAR; INTRAVENOUS at 14:31

## 2022-06-07 RX ADMIN — LIDOCAINE HYDROCHLORIDE 40 MG: 20 INJECTION, SOLUTION EPIDURAL; INFILTRATION; INTRACAUDAL; PERINEURAL at 14:36

## 2022-06-07 RX ADMIN — MIDAZOLAM 1 MG: 1 INJECTION INTRAMUSCULAR; INTRAVENOUS at 14:34

## 2022-06-07 ASSESSMENT — PAIN DESCRIPTION - FREQUENCY: FREQUENCY: CONTINUOUS

## 2022-06-07 ASSESSMENT — PAIN - FUNCTIONAL ASSESSMENT: PAIN_FUNCTIONAL_ASSESSMENT: 0-10

## 2022-06-07 ASSESSMENT — LIFESTYLE VARIABLES: SMOKING_STATUS: 0

## 2022-06-07 ASSESSMENT — PAIN DESCRIPTION - DESCRIPTORS
DESCRIPTORS: CRAMPING
DESCRIPTORS: ACHING

## 2022-06-07 ASSESSMENT — PAIN DESCRIPTION - ONSET: ONSET: ON-GOING

## 2022-06-07 ASSESSMENT — PAIN SCALES - GENERAL
PAINLEVEL_OUTOF10: 5
PAINLEVEL_OUTOF10: 5
PAINLEVEL_OUTOF10: 6
PAINLEVEL_OUTOF10: 6
PAINLEVEL_OUTOF10: 3

## 2022-06-07 ASSESSMENT — ENCOUNTER SYMPTOMS
ALLERGIC/IMMUNOLOGIC NEGATIVE: 1
COUGH: 0
EYES NEGATIVE: 1
GASTROINTESTINAL NEGATIVE: 1
SHORTNESS OF BREATH: 0
RESPIRATORY NEGATIVE: 1

## 2022-06-07 ASSESSMENT — PAIN DESCRIPTION - LOCATION
LOCATION: HAND
LOCATION: HAND

## 2022-06-07 ASSESSMENT — PAIN DESCRIPTION - ORIENTATION
ORIENTATION: LEFT
ORIENTATION: LEFT

## 2022-06-07 ASSESSMENT — PAIN DESCRIPTION - PAIN TYPE
TYPE: SURGICAL PAIN
TYPE: SURGICAL PAIN

## 2022-06-07 NOTE — OP NOTE
OPERATIVE REPORT          Patient:  Brooklynn Duque    YOB: 1990  Date of Service:  6/7/2022   Location[address]  [address]    Preoperative Diagnosis:    Left carpal tunnel syndrome    Postoperative Diagnosis:    Same    Procedure:    Left carpal tunnel release      Surgeon:    Tigre Myles. Whit Perez MD    Surgical Assistant:    Femi Esparza Assistant    Anesthesia:   Local with Sedation    Blood Loss:   Minimal    Complications:  None    Tourniquet Time: 3 minutes     Indications:  Ms. Brooklynn Duque  is a 28y.o. year-old female with Left carpal tunnel syndrome. I have discussed preoperatively with her  the complications, limitations, expectations, alternatives and risks of surgical care, which she has understood. All of her questions have been fully answered, and she has provided written informed consent to proceed. Procedure:   After written consent was obtained and the proper operative site was identified and marked, Ms. Israel Tay was brought to the operating room, placed in the supine position on the operating room table with the Left arm extended upon a hand table. Under an appropriate level of sedation, local anesthetic (1% Lidocaine and 1/2% Marcaine both without Epinephrine) was instilled in the planned surgical field. Her Left upper extremity was prepped and draped in the usual sterile fashion. After Esmarch exsanguination, the pneumotourniquet was inflated to 250 mm of mercury. A 2 cm longitudinal incision was fashioned at the base of the palm, paralleling the longitudinal thenar crease. Dissection was carried carefully through the subcutaneous tissue identifying and protecting the neurovascular structures. The palmar fascia was incised longitudinally, exposing the transverse carpal ligament. The transverse carpal ligament was incised from its proximal to distal most extent, under direct visualization.  The terminal 2 cm of antebrachial fascia was similarly incised under direct visualization. The contents of the carpal tunnel were inspected and found to be free of mass, lesion or other abnormality. Digital palpation revealed no further constriction about the median nerve. The wound was irrigated copiously with sterile saline for irrigation and the pneumotourniquet was deflated after a period of 3 minutes elevation. The fingers were immediately pink & well perfused. Hemostasis was easily obtained with direct pressure and electrocautery and the wound was closed with interrupted sutures. The wound was dressed with adaptic, dry sterile dressings and a bulky soft hand & wrist dressing was applied. Ms. Marshall Kaur  was awakened from light sedation, having tolerated the procedure without apparent complication. She  was returned to the recovery room in stable condition. At the conclusion of the procedure all needle, instrument, and sponge counts were correct. Weston Arredondo MD   6/7/2022, 2:48 PM

## 2022-06-07 NOTE — PROGRESS NOTES
Patient to phase 2 from pacu, vss on room air, patient wants to get dressed and use the restroom, patient ambulated well to bathroom and voided without issue, patient sitting in recliner tolerating snack and drink, call light within reach, will continue to monitor. 1616: Discharge instructions given to patient and family, all state understanding and deny having any further questions at this time.

## 2022-06-07 NOTE — ANESTHESIA POSTPROCEDURE EVALUATION
Department of Anesthesiology  Postprocedure Note    Patient: Chino Plummer  MRN: [id number]  [place]: 1990  Date of evaluation: 6/7/2022  Time:  4:35 PM     Procedure Summary     Date: 06/07/22 Room / Location: [address] [place] [address]    Anesthesia Start: 2615 Anesthesia Stop: 1449    Procedure: LEFT CARPAL TUNNEL RELEASE (Left Hand) Diagnosis:       Carpal tunnel syndrome on left      (LEFT CARPAL TUNNEL SYNDROME)    Surgeons: Chuck Ramirez MD Responsible Provider: Guillermina Lua MD    Anesthesia Type: MAC ASA Status: 2          Anesthesia Type: MAC    Maikol Phase I: Maikol Score: 9    Maikol Phase II: Maikol Score: 10    Last vitals: Reviewed and per EMR flowsheets.        Anesthesia Post Evaluation    Patient location during evaluation: PACU  Patient participation: complete - patient participated  Level of consciousness: awake and alert  Pain score: 2  Airway patency: patent  Nausea & Vomiting: no nausea and no vomiting  Complications: no  Cardiovascular status: blood pressure returned to baseline  Respiratory status: acceptable  Hydration status: euvolemic

## 2022-06-07 NOTE — ANESTHESIA PRE PROCEDURE
[facility] Department of Anesthesiology  Pre-Anesthesia Evaluation/Consultation       Name:  Darius Slaughter  : 1990  Age:  28 y.o.                                            MRN:  [id number]  Date: 2022           Surgeon: Surgeon(s):  Mendez Cuellar MD    Procedure: Procedure(s):  LEFT CARPAL TUNNEL RELEASE     Allergies   Allergen Reactions    Latex Rash    Strawberry Extract Hives     Patient Active Problem List   Diagnosis    History of gestational diabetes mellitus (GDM) in prior pregnancy, currently pregnant in second trimester    Obesity in pregnancy    Screening, , for malformation by ultrasound    Morbid obesity with BMI of 45.0-49.9, adult ([facility] [address]    Essential hypertension    Chronic GERD    Vitamin D deficiency    Carpal tunnel syndrome, bilateral     Past Medical History:   Diagnosis Date    Abnormal heart rhythm     irregular heart rate    Abnormal Pap smear of cervix     LEEP    H/o Molar pregnancy     Hypertension     Hypoglycemia     Mental disorder     on meds in high school for bipolar, not currently taking medications    Uterus bicornis affecting pregnancy      Past Surgical History:   Procedure Laterality Date    APPENDECTOMY      CHOLECYSTECTOMY  2016    DILATION AND CURETTAGE OF UTERUS      LEEP      TONSILLECTOMY  2016    UPPER GASTROINTESTINAL ENDOSCOPY N/A 10/22/2021    EGD BIOPSY performed by Shon Villeda MD at [address] History     Tobacco Use    Smoking status: Former Smoker     Packs/day: 0.50     Years: 15.00     Pack years: 7.50     Types: Cigarettes     Quit date: 10/24/2016     Years since quittin.6    Smokeless tobacco: Never Used   Vaping Use    Vaping Use: Never used   Substance Use Topics    Alcohol use: No    Drug use: Not Currently     Types: Marijuana Garon Kettle)     Comment: Prior hx of opiate use-LAST USED MARIJUANA-     Medications  No current facility-administered medications on file prior to encounter. Current Outpatient Medications on File Prior to Encounter   Medication Sig Dispense Refill    metoprolol tartrate (LOPRESSOR) 25 MG tablet Take 25 mg by mouth 2 times daily       Current Facility-Administered Medications   Medication Dose Route Frequency Provider Last Rate Last Admin    0.9 % sodium chloride infusion   IntraVENous Continuous Lv Daily MD 75 mL/hr at 22 1306 New Bag at 22 1306    sodium chloride flush 0.9 % injection 5-40 mL  5-40 mL IntraVENous 2 times per day Lv Daily MD        sodium chloride flush 0.9 % injection 5-40 mL  5-40 mL IntraVENous PRN Lv Daily MD        0.9 % sodium chloride infusion   IntraVENous PRN Lv Daily MD        sodium chloride 0.9 % irrigation    Continuous PRN Patricia Thomas MD   250 mL at 22 1344     Vital Signs (Current)   Vitals:    22 1249 22 1256   BP:  (!) 141/78   Pulse:  76   Resp:  16   Temp:  98.4 °F (36.9 °C)   TempSrc:  Temporal   SpO2:  98%   Weight: 260 lb 14.6 oz (118.3 kg)    Height: 5' 1\" (1.549 m)                                             Vital Signs Statistics (for past 48 hrs)     Temp  Av.4 °F (36.9 °C)  Min: 98.4 °F (36.9 °C)   Min taken time: 22 1256  Max: 98.4 °F (36.9 °C)   Max taken time: 22 1256  Pulse  Av  Min: 68   Min taken time: 22 1256  Max: 68   Max taken time: 22 1256  Resp  Av  Min: 12   Min taken time: 22 1256  Max: 12   Max taken time: 22 1256  BP  Min: 141/78   Min taken time: 22 1256  Max: 141/78   Max taken time: 22 1256  SpO2  Av %  Min: 98 %   Min taken time: 22 1256  Max: 98 %   Max taken time: 22 1256  BP Readings from Last 3 Encounters:   22 (!) 141/78   22 136/84   22 122/79       BMI  Body mass index is 49.3 kg/m².   Estimated body mass index is 49.3 kg/m² as calculated from the following:    Height as of this encounter: 5' 1\" (1.549 m). Weight as of this encounter: 260 lb 14.6 oz (118.3 kg). CBC   Lab Results   Component Value Date    WBC 7.3 03/16/2022    RBC 4.50 03/16/2022    HGB 13.2 03/16/2022    HCT 40.0 03/16/2022    MCV 88.9 03/16/2022    RDW 13.9 03/16/2022     03/16/2022     CMP    Lab Results   Component Value Date     03/16/2022    K 4.3 03/16/2022    K 3.6 08/04/2021     03/16/2022    CO2 19 03/16/2022    BUN 11 03/16/2022    CREATININE 0.5 03/16/2022    GFRAA >60 03/16/2022    AGRATIO 1.4 03/16/2022    LABGLOM >60 03/16/2022    GLUCOSE 93 03/16/2022    PROT 7.5 03/16/2022    CALCIUM 9.5 03/16/2022    BILITOT 0.4 03/16/2022    ALKPHOS 87 03/16/2022    AST 31 03/16/2022    ALT 53 03/16/2022     BMP    Lab Results   Component Value Date     03/16/2022    K 4.3 03/16/2022    K 3.6 08/04/2021     03/16/2022    CO2 19 03/16/2022    BUN 11 03/16/2022    CREATININE 0.5 03/16/2022    CALCIUM 9.5 03/16/2022    GFRAA >60 03/16/2022    LABGLOM >60 03/16/2022    GLUCOSE 93 03/16/2022     POCGlucose  No results for input(s): GLUCOSE in the last 72 hours.    Coags    Lab Results   Component Value Date    PROTIME 11.5 03/16/2022    INR 1.02 03/16/2022    APTT 31.1 87/83/1152     HCG (If Applicable)   Lab Results   Component Value Date    PREGTESTUR Negative 06/07/2022    HCG NEGATIVE 11/29/2016    HCGQUANT <1 11/16/2016      ABGs No results found for: PHART, PO2ART, TPJ0VAX, PUF6XXY, BEART, O2DRYQKA   Type & Screen (If Applicable)  No results found for: LABABO, LABRH                         BMI: Wt Readings from Last 3 Encounters:       NPO Status:   Date of last liquid consumption: 06/06/22   Time of last liquid consumption: 2300   Date of last solid food consumption: 06/06/22      Time of last solid consumption: 2300       Anesthesia Evaluation  Patient summary reviewed no history of anesthetic complications:   Airway: Mallampati: II  TM distance: >3 FB   Neck ROM: full  Mouth opening: > = 3 FB Dental: normal exam         Pulmonary: breath sounds clear to auscultation      (-) COPD, asthma, sleep apnea and not a current smoker                           Cardiovascular:    (+) hypertension:,     (-) past MI, CABG/stent,  angina and no hyperlipidemia        Rate: normal                    Neuro/Psych:   (+) neuromuscular disease:, psychiatric history:   (-) seizures, TIA and CVA           GI/Hepatic/Renal:   (+) GERD:, bowel prep,      (-) liver disease, no renal disease and no morbid obesity       Endo/Other:        (-) diabetes mellitus, hypothyroidism               Abdominal:             Vascular:     - DVT and PE. Other Findings:           Anesthesia Plan      MAC     ASA 2       Induction: intravenous. Anesthetic plan and risks discussed with patient. Plan discussed with CRNA. This pre-anesthesia assessment may be used as a history and physical.    DOS STAFF ADDENDUM:    Pt seen and examined, chart reviewed (including anesthesia, drug and allergy history). No interval changes to history and physical examination. Anesthetic plan, risks, benefits, alternatives, and personnel involved discussed with patient. Patient verbalized an understanding and agrees to proceed.       Iza Baker MD  June 7, 2022  2:29 PM

## 2022-06-07 NOTE — PROGRESS NOTES
To pacu from OR. Pt asleep, wakes easily. States pain 5/10. Dressing to left hand dry and intact. Radial pulses palpable. IV infusing. Monitor in sinus rhythm.

## 2022-06-08 NOTE — PROGRESS NOTES
[facility]) Physicians   [employer]    Subjective:      Patient ID: Siria Recio is a 28 y.o. female    HPI    The patient is here for their bariatric surgery presurgical visit for future weight loss. She has made several attempts at weight loss in the past without success and now wishes to pursue bariatric surgery. She is working to change her dietary behaviors and lose weight to improve comorbid conditions such as hypertension and GERD. Patient had left carpal tunnel surgery on 2022 and having right hand completed on 2022. Israel Tim is a 28 y.o. female with Body mass index is 49.32 kg/m². Past Medical History:   Diagnosis Date    Abnormal heart rhythm     irregular heart rate    Abnormal Pap smear of cervix 2015    LEEP    H/o Molar pregnancy     Hypertension     Hypoglycemia     Mental disorder     on meds in high school for bipolar, not currently taking medications    Uterus bicornis affecting pregnancy      Past Surgical History:   Procedure Laterality Date    APPENDECTOMY  2015    CARPAL TUNNEL RELEASE Left 2022    LEFT CARPAL TUNNEL RELEASE performed by Rosendo Shearer MD at [address]     [address] [facility]  2011    HAND SURGERY      for carpel tunnel     LEEP      TONSILLECTOMY  2016    UPPER GASTROINTESTINAL ENDOSCOPY N/A 10/22/2021    EGD BIOPSY performed by Dionne Pfeiffer MD at [address] [place] [address]     Family History   Problem Relation Age of Onset    Diabetes Mother     No Known Problems Father      Social History     Tobacco Use    Smoking status: Former Smoker     Packs/day: 0.50     Years: 15.00     Pack years: 7.50     Types: Cigarettes     Quit date: 10/24/2016     Years since quittin.6    Smokeless tobacco: Never Used   Substance Use Topics    Alcohol use: No     I counseled the patient on the importance of not smoking and risks of ETOH.    Allergies   Allergen Reactions    Latex Rash    Strawberry Extract Hives     Vitals:    06/20/22 1414   BP: 131/84   Pulse: 68   Weight: 261 lb (118.4 kg)   Height: 5' 1\" (1.549 m)     Body mass index is 49.32 kg/m².     Current Outpatient Medications:     medroxyPROGESTERone Acetate (DEPO-PROVERA IM), Inject into the muscle every 3 months, Disp: , Rfl:     metoprolol tartrate (LOPRESSOR) 25 MG tablet, Take 25 mg by mouth 2 times daily, Disp: , Rfl:     Lab Results   Component Value Date    WBC 7.3 03/16/2022    RBC 4.50 03/16/2022    HGB 13.2 03/16/2022    HCT 40.0 03/16/2022    MCV 88.9 03/16/2022    MCH 29.5 03/16/2022    MCHC 33.1 03/16/2022    MPV 9.4 03/16/2022    NEUTOPHILPCT 58.0 03/16/2022    LYMPHOPCT 29.8 03/16/2022    MONOPCT 9.2 03/16/2022    EOSRELPCT 2.4 03/16/2022    BASOPCT 0.6 03/16/2022    NEUTROABS 4.2 03/16/2022    NEUTROABS 5.40 11/16/2016    LYMPHSABS 2.2 03/16/2022    MONOSABS 0.7 03/16/2022    EOSABS 0.2 03/16/2022     Lab Results   Component Value Date     03/16/2022    K 4.3 03/16/2022    K 3.6 08/04/2021     03/16/2022    CO2 19 03/16/2022    ANIONGAP 15 03/16/2022    GLUCOSE 93 03/16/2022    BUN 11 03/16/2022    CREATININE 0.5 03/16/2022    LABGLOM >60 03/16/2022    GFRAA >60 03/16/2022    CALCIUM 9.5 03/16/2022    PROT 7.5 03/16/2022    LABALBU 4.4 03/16/2022    AGRATIO 1.4 03/16/2022    BILITOT 0.4 03/16/2022    ALKPHOS 87 03/16/2022    ALT 53 03/16/2022    AST 31 03/16/2022    GLOB 3.5 08/04/2021     Lab Results   Component Value Date    CHOL 165 03/16/2022    TRIG 80 03/16/2022    HDL 44 03/16/2022    LDLCALC 105 03/16/2022    LABVLDL 16 03/16/2022     Lab Results   Component Value Date    TSHREFLEX 1.89 03/16/2022     Lab Results   Component Value Date    IRON 89 03/16/2022    TIBC 354 03/16/2022    LABIRON 25 03/16/2022     Lab Results   Component Value Date    SERQGIFR16 573 03/16/2022    FOLATE 7.52 03/16/2022     Lab Results   Component Value Date    VITD25 18.7 03/16/2022     Lab Results   Component Value Date LABA1C 5.4 03/16/2022    .3 03/16/2022       Review of Systems   Constitutional: Negative. Negative for chills, fatigue and fever. HENT: Negative. Eyes: Negative. Respiratory: Negative. Negative for cough and shortness of breath. Cardiovascular: Negative. Gastrointestinal: Negative. Endocrine: Negative. Genitourinary: Negative. Musculoskeletal: Negative. Skin: Negative. Allergic/Immunologic: Negative. Neurological: Negative. Hematological: Negative. Psychiatric/Behavioral: Negative. Objective:   Physical Exam  Vitals reviewed. Constitutional:       Appearance: She is well-developed. HENT:      Head: Normocephalic and atraumatic. Eyes:      Conjunctiva/sclera: Conjunctivae normal.      Pupils: Pupils are equal, round, and reactive to light. Cardiovascular:      Rate and Rhythm: Normal rate. Pulmonary:      Effort: Pulmonary effort is normal.   Abdominal:      Palpations: Abdomen is soft. Musculoskeletal:         General: Normal range of motion. Cervical back: Normal range of motion and neck supple. Skin:     General: Skin is warm and dry. Neurological:      Mental Status: She is alert and oriented to person, place, and time. Psychiatric:         Behavior: Behavior normal.         Thought Content: Thought content normal.         Judgment: Judgment normal.       Assessment and Plan:   Patient is here for their 6th presurgery visit for sleeve, up 1.2 lbs. The patient's current Body mass index is 49.32 kg/m². (6/20/22). She is making good dietary and behavior modifications. She did meet with the registered dietitian for continued follow up. Discussed with dietitian and I agree with recommendations and plan. She is exercising with walking daily. Encouraged continued physical activity.  Patient received instruction that it is recommended to avoid pregnancy following bariatric surgery for at least 2 years to allow them to have stable weight loss and to help avoid increased risk of vitamin deficiencies and malnutrition. The patient was encouraged to discuss possible contraceptive methods with their PCP or OBGYN. Patient counseled on the avoidance of tobacco/nicotine, alcohol and illicit drug abuse. Dietitian and I have spoken and agree the patient is ready for a surgical date. Have cautioned patient about weight gain going forward and maintaining healthy behaviors as to not risk surgery being cancelled or denied by insurance. Medications preoperatively and postoperatively handout provided and reviewed. Discussed preop work up which still needs PCP letter (Seeing PCP on June 30, 2022 and will be given letter then. Patient will then drop off at office.) and drug, alcohol & nicotine screening (ordered today). We will see her back in 1 month for continued follow up. Essential Hypertension:  [x] Continue to make dietary and lifestyle modifications per our recommendations. [x] Reviewed the importance of checking blood pressure. [x] Continue to follow up with their PCP for medication management (Lopressor) and monitoring. [] Follow up labs ordered today. Chronic GERD:   [x] Continue to make dietary and lifestyle modifications per our recommendations. [] Continue PPI. [] Continue H2 Blocker  [] Wean PPI. Take every other day for two weeks. If no issues with heartburn/reflux you may decrease to every third day for two weeks. If no issues with heartburn/reflux you may stop the Prilosec. Recommend that you get OTC Pepcid to take should you have occasional heartburn/reflux. Obesity:  [x] Continue to make dietary and lifestyle modifications per our recommendations. [x] Plan for Future laparoscopic sleeve gastrectomy. [x] Return for follow-up next month.     Total encounter time: 30 minutes, including any number of the following: Bariatric Preoperative work up/protocols, review of labs, imaging, provider notes, outside hospital records, performing examination/evaluation, counseling patient and/or family, ordering medications/tests, placing referrals and communication with referring physicians, coordination of care; discussing exercise and physical activity; discussing dietary plan/recall with the patient as well with registered dietitian and documentation in the EHR. Of note, the above was done during same day of the actual patient encounter.

## 2022-06-08 NOTE — PATIENT INSTRUCTIONS
Goals in preparing for bariatric surgery  You should be giving up all beverages that have carbonation, sugar, and caffeine (Refer to the approved liquids list provided at initial visit).  You should be drinking 64 ounces of low calorie (5 calories or less per serving) fluids per day. Suggestions include:  o Water (you may add fresh lemon or lime)  o Crystal Light  o Bryan Liquid Water Enhancer  o Propel Zero  o Powerade Zero/Gatorade Zero  o Isopure  o Ilzhe1I  o SOBE Lifewater Zero  o Vitamin Water Zero  o Sugar Free Gonzalo-Aid  You should be eating 4-6 times per day.  Three small meals plus 1-2 snacks per day is your goal. This balances your calories and nutrients evenly throughout the day and helps to boost your metabolism. Refer to the snack list provided at your initial visit. Aim for a protein at every snack, plus a fruit, vegetable or starch. You should be eating protein at every meal and snack.  Protein is typically found in animal sources, i.e. chicken, lean beef, lean pork, fish, seafood and eggs. It is also found in low-fat dairy sources such as skim or 1% milk, low-fat yogurt, low-fat cheese, and low-fat cottage cheese. Plant based sources of protein include peanut butter, beans, and soy. You should be utilizing the 9-inch plate method.  Eating on a smaller plate will help you control portion size, but what you put on your plate counts also. Make ¼ of your plate lean protein, ¼ carbohydrate (fruit, grain or starchy vegetables) and ½ the plate non-starchy vegetables. You should eliminate caffeine.  Caffeine is dehydrating. After surgery, it's very important to stay hydrated. Giving up caffeine before surgery will help you focus on the changes necessary to be successful after surgery. There are many decaffeinated coffee and tea products available in grocery stores. You should eliminate alcohol.    You must abstain from alcohol prior to surgery and for at least the first 6-9 months after surgery. Although you may have alcohol in the future it is important to understand, as a bariatric surgery patient, there is a risk of it negatively affecting your weight loss goals and it can pose a risk for addiction. You should be reducing added fat and sugar in your diet.  Frying foods adds too much fat and calories, but you could use an air fryer as it requires significantly less oil. Baking, broiling, or grilling meats add flavor without unhealthy fats. Using cooking oil spray and spray butter products are also healthy options that will aid in your weight loss. Foods high in added sugars are often also high in calories and low in nutrients. If you are a smoker or use e-cigarettes/vaping, you must eliminate.  You must be nicotine free and/or not vaping for at least 8 weeks before your surgery can be scheduled. You will be screened for nicotine through lab work. Eating habits after surgery need to be a long-term change. Eating habits are often so ingrained that it can be difficult to change. It is important to practice new eating habits prior to surgery to mentally prepare yourself for the challenge ahead. Also, remember that overall health, age, and genetics make each person's weight loss progress different. Do not compare your progress (pre- or post-operatively), the amount you eat, or your exercise to other patients. In addition, it is the responsibility of the patient to schedule and follow up on labs and tests completed during the pre-surgical period. Results will be reviewed at each visit. **IT IS IMPORTANT TO KNOW THAT YOU MUST COMPLETE ALL REQUIRED DIETARY CHANGES, TESTS, CLEARANCES AND ACTIVITIES BEFORE A SURGERY DATE CAN BE GIVEN. IF YOU HAVE NOT MET ANY OF THESE REQUIREMENTS THEN YOU WILL NOT BE GIVEN A SURGERY DATE UNTIL THEY HAVE BEEN MET TO OUR SATISFACTION.  THIS IS NOT ONLY DONE TO HELP YOU BE SUCCESSFUL AFTER SURGERY, BUT TO BE SAFE AS WELL.**    Patient received dietary handouts and education.

## 2022-06-10 NOTE — H&P
Pre-operative Update of H&P:    I  have seen & examined Ms. Israel GIOVANNA Tay related solely to her hand and upper extremity conditions, prior to the scheduled procedure on the date of her surgery. The indications for the planned surgical procedure & and her upper-extremity condition are unchanged.

## 2022-06-13 ENCOUNTER — OFFICE VISIT (OUTPATIENT)
Dept: ORTHOPEDIC SURGERY | Age: 32
End: 2022-06-13

## 2022-06-13 VITALS — HEIGHT: 61 IN | WEIGHT: 260 LBS | RESPIRATION RATE: 16 BRPM | BODY MASS INDEX: 49.09 KG/M2

## 2022-06-13 DIAGNOSIS — G56.03 CARPAL TUNNEL SYNDROME, BILATERAL: Primary | ICD-10-CM

## 2022-06-13 PROCEDURE — 99024 POSTOP FOLLOW-UP VISIT: CPT | Performed by: ORTHOPAEDIC SURGERY

## 2022-06-13 NOTE — PROGRESS NOTES
Ms. David Gallardo returns today in follow-up of her recent left Carpal Tunnel Release done approximately 1 week ago. She has done well noting no discomfort and no other reported complications. She notes pre-operative symptoms to be completely resolved at this time. Physical Exam:  Bandage intact and well cared for  Skin incision is healing well, without erythema, drainage or sign of infection. Digital range of motion is Full. Wrist range of motion is Full. Sensation is significantly improved from preoperatvely  Vascular examination reveals normal, good capillary refill and good color. Swelling is minimal.  Sensory and Motor Median Nerve function is intact. Impression:  Ms. David Gallardo is doing well after recent left Carpal Tunnel Release. Plan:  Ms. David Gallardo is instructed in work on Active & Passive range of motion of the digits, wrist, & elbow. These modalities were specifically demonstrated to her today. We discussed the appropriateness of gradual resumption of use of the operated hand and the return to normal use as comfort allows. She is given instructions regarding management of the fresh surgical incision and progressive use of desensitization and tissue massage techniques. We discussed the appropriate expectations and timeline for symptom improvement. She is provided a written patient instruction sheet titled: Postoperative Instructions After Carpal Tunnel Release. I have asked Ms. Israel Tay to follow-up with me or contact me by telephone over the next 2-4 weeks if her symptoms have not fully resolved or if she has not regained full & painless return of function. She is also specifically instructed to return to the office or call for an appointment sooner if her symptoms are changing or worsening prior to that time.

## 2022-06-13 NOTE — PATIENT INSTRUCTIONS
Postoperative Instructions After Carpal Tunnel Release    Dr. Prosper Galindo. Livan        1. After bandages are removed one week from surgery, you may chose to wear a small bandage over the incision if you wish, though you do not need to. 2. Keep incision dry until sutures have fully dissolved  or it has been 12 - 14 days since your surgery. Thereafter, you may wash with mild soap and water and shower normally. 3. Work hard on motion of the fingers and wrist, straightening each finger fully and bending each finger fully, bending wrist forward and bending wrist backwards. Do not be concerned if you experience discomfort. This will not damage the surgery. 4. You may begin using the hand as it feels comfortable beginning 12 - 14 days from the day of surgery. You may not feel entirely comfortable gripping or lifting heavy objects for several weeks. 5. You may expect to see some skin peel off around the incision. You may be left with a small area of pink baby skin. This is quite normal.  6. Once your stiches have fully disappeared & skin appears normal, you should begin gently massaging the incision with Vitamin E (may use Vitamin E lotion or contents of Vitamin E capsule). Thank you for choosing [facility]) Physicians for your Hand and Upper Extremity needs. If we can be of any further assistance to you, please do not hesitate to contact us.     Office Phone Number:  [phone]  or  [phone]

## 2022-06-20 ENCOUNTER — OFFICE VISIT (OUTPATIENT)
Dept: BARIATRICS/WEIGHT MGMT | Age: 32
End: 2022-06-20
Payer: MEDICAID

## 2022-06-20 VITALS
HEIGHT: 61 IN | WEIGHT: 261 LBS | BODY MASS INDEX: 49.28 KG/M2 | DIASTOLIC BLOOD PRESSURE: 84 MMHG | HEART RATE: 68 BPM | SYSTOLIC BLOOD PRESSURE: 131 MMHG

## 2022-06-20 DIAGNOSIS — E66.01 MORBID OBESITY WITH BMI OF 45.0-49.9, ADULT (HCC): ICD-10-CM

## 2022-06-20 DIAGNOSIS — Z01.818 PREOP TESTING: ICD-10-CM

## 2022-06-20 DIAGNOSIS — I10 ESSENTIAL HYPERTENSION: Primary | ICD-10-CM

## 2022-06-20 DIAGNOSIS — K21.9 CHRONIC GERD: ICD-10-CM

## 2022-06-20 PROCEDURE — 1036F TOBACCO NON-USER: CPT | Performed by: NURSE PRACTITIONER

## 2022-06-20 PROCEDURE — G8427 DOCREV CUR MEDS BY ELIG CLIN: HCPCS | Performed by: NURSE PRACTITIONER

## 2022-06-20 PROCEDURE — G8417 CALC BMI ABV UP PARAM F/U: HCPCS | Performed by: NURSE PRACTITIONER

## 2022-06-20 PROCEDURE — 99214 OFFICE O/P EST MOD 30 MIN: CPT | Performed by: NURSE PRACTITIONER

## 2022-06-20 NOTE — PROGRESS NOTES
Israel Tay gained 1 lbs over 1 month. Is pt eating at least 4 times everyday? yes she is     Breakfast: 1-2 PM: HNC w/ 1% milk +apple + string cheese  Snack: 3 PM: celery + PB   Lunch: 4 PM: steak + broccoli + potato w/ cheese  Snack: None OR Protein shake made w/ milk or water  Dinner: 7-8 PM: rice 1/2 cup + baked fish + fruit salad   Snack: None  Bed around 3 AM or 6-7 AM    Is pt eating a lean protein source with all meals and snacks? yes she is    Has pt decreased their portions using the plate method? yes      Is pt choosing low fat/sugar free options? yes she is    Is pt drinking at least 64 oz of clear liquids everyday? yes unsweet tea-decaf, 6-7 peralta, crystal light     Has pt stopped drinking carbonation, caffeinated, and sugar sweetened beverages? no    Has pt sampled Unjury and/or Nectar protein? yes she has    Has pt attended a support group? Completed    Participating in intentional exercise? yes walking 2-3 times/day (30 mins each time)    Plan/Recommendations:   Continue with diet and exercise.         Handouts: none    Dayana Jordan RD, LD

## 2022-06-20 NOTE — Clinical Note
Ryan Nichols,  Patient will be ready for a surgery date after we get her PCP letter. She is seeing her PCP on June 30th and will be given the letter that day and then will drop off at office. I let her know we can not schedule until we receive that letter.   Thank you,  Bethany Lyn, DIANEC

## 2022-06-21 NOTE — PROGRESS NOTES
[facility] time______1100______        Surgery time____1230________    Take the following medications with a sip of water: Follow your MD/Surgeons pre-procedure instructions regarding your medications     Do not eat or drink anything after 12:00 midnight prior to your surgery. This includes water chewing gum, mints and ice chips. You may brush your teeth and gargle the morning of your surgery, but do not swallow the water     Please see your family doctor/pediatrician for a history and physical and/or concerning medications. Bring any test results/reports from your physicians office. If you are under the care of a heart doctor or specialist doctor, please be aware that you may be asked to them for clearance    You may be asked to stop blood thinners such as Coumadin, Plavix, Fragmin, Lovenox, etc., or any anti-inflammatories such as:  Aspirin, Ibuprofen, Advil, Naproxen prior to your surgery. We also ask that you stop any OTC medications such as fish oil, vitamin E, glucosamine, garlic, Multivitamins, COQ 10, etc.    We ask that you do not smoke 24 hours prior to surgery  We ask that you do not  drink any alcoholic beverages 24 hours prior to surgery     You must make arrangements for a responsible adult to take you home after your surgery. For your safety you will not be allowed to leave alone or drive yourself home. Your surgery will be cancelled if you do not have a ride home. Also for your safety, it is strongly suggested that someone stay with you the first 24 hours after your surgery. A parent or legal guardian must accompany a child scheduled for surgery and plan to stay at the hospital until the child is discharged. Please do not bring other children with you. For your comfort, please wear simple loose fitting clothing to the hospital.  Please do not bring valuables.     Do not wear any make-up or nail polish on your fingers or toes      For your safety, please do not wear any jewelry or body piercing's on the day of surgery. All jewelry must be removed. If you have dentures, they will be removed before going to operating room. For your convenience, we will provide you with a container. If you wear contact lenses or glasses, they will be removed, please bring a case for them. If you have a living will and a durable power of  for healthcare, please bring in a copy. As part of our patient safety program to minimize surgical site infections, we ask you to do the following:    · Please notify your surgeon if you develop any illness between         now and the  day of your surgery. · This includes a cough, cold, fever, sore throat, nausea,         or vomiting, and diarrhea, etc.  ·  Please notify your surgeon if you experience dizziness, shortness         of breath or blurred vision between now and the time of your surgery. Do not shave your operative site 96 hours prior to surgery. For face and neck surgery, men may use an electric razor 48 hours   prior to surgery. You may shower the night before surgery or the morning of   your surgery with an antibacterial soap. You will need to bring a photo ID and insurance card    [facility] has an onsite pharmacy, would you like to utilize our pharmacy     If you will be staying overnight and use a C-pap machine, please bring   your C-pap to hospital     Our goal is to provide you with excellent care, therefore, visitors will be limited to two(2) in the room at a time so that we may focus on providing this care for you. Please contact pre-admission testing if you have any further questions. [facility] phone number:  [phone] PAT fax number:  [phone]  Please note these are generalized instructions for all surgical cases, you may be provided with more specific instructions according to your surgery.     C-Difficile admission screening and protocol:       * Admitted with diarrhea? [] YES    [x]  NO     *Prior history of C-Diff. In last 3 months? [] YES    [x]  NO     *Antibiotic use in the past 6-8 weeks? [x]  NO    []  YES                 If yes, which ANTIBIOTIC AND REASON______     *Prior hospitalization or nursing home in the last month? []  YES    [x]  NO        SAFETY FIRST. .call before you fall

## 2022-06-23 ENCOUNTER — ANESTHESIA EVENT (OUTPATIENT)
Dept: OPERATING ROOM | Age: 32
End: 2022-06-23
Payer: MEDICAID

## 2022-06-24 ENCOUNTER — HOSPITAL ENCOUNTER (OUTPATIENT)
Age: 32
Setting detail: OUTPATIENT SURGERY
Discharge: HOME | End: 2022-06-24
Attending: ORTHOPAEDIC SURGERY | Admitting: ORTHOPAEDIC SURGERY
Payer: MEDICAID

## 2022-06-24 ENCOUNTER — ANESTHESIA (OUTPATIENT)
Dept: OPERATING ROOM | Age: 32
End: 2022-06-24
Payer: MEDICAID

## 2022-06-24 VITALS
DIASTOLIC BLOOD PRESSURE: 70 MMHG | BODY MASS INDEX: 48.68 KG/M2 | WEIGHT: 257.83 LBS | RESPIRATION RATE: 12 BRPM | TEMPERATURE: 98 F | HEIGHT: 61 IN | OXYGEN SATURATION: 98 % | HEART RATE: 62 BPM | SYSTOLIC BLOOD PRESSURE: 120 MMHG

## 2022-06-24 LAB — PREGNANCY, URINE: NEGATIVE

## 2022-06-24 PROCEDURE — 2580000003 HC RX 258: Performed by: ANESTHESIOLOGY

## 2022-06-24 PROCEDURE — 7100000010 HC PHASE II RECOVERY - FIRST 15 MIN: Performed by: ORTHOPAEDIC SURGERY

## 2022-06-24 PROCEDURE — 2580000003 HC RX 258: Performed by: ORTHOPAEDIC SURGERY

## 2022-06-24 PROCEDURE — A4217 STERILE WATER/SALINE, 500 ML: HCPCS | Performed by: ORTHOPAEDIC SURGERY

## 2022-06-24 PROCEDURE — 2500000003 HC RX 250 WO HCPCS

## 2022-06-24 PROCEDURE — 3600000005 HC SURGERY LEVEL 5 BASE: Performed by: ORTHOPAEDIC SURGERY

## 2022-06-24 PROCEDURE — 3700000000 HC ANESTHESIA ATTENDED CARE: Performed by: ORTHOPAEDIC SURGERY

## 2022-06-24 PROCEDURE — 7100000001 HC PACU RECOVERY - ADDTL 15 MIN: Performed by: ORTHOPAEDIC SURGERY

## 2022-06-24 PROCEDURE — 7100000011 HC PHASE II RECOVERY - ADDTL 15 MIN: Performed by: ORTHOPAEDIC SURGERY

## 2022-06-24 PROCEDURE — 64721 CARPAL TUNNEL SURGERY: CPT | Performed by: ORTHOPAEDIC SURGERY

## 2022-06-24 PROCEDURE — 3600000015 HC SURGERY LEVEL 5 ADDTL 15MIN: Performed by: ORTHOPAEDIC SURGERY

## 2022-06-24 PROCEDURE — 2500000003 HC RX 250 WO HCPCS: Performed by: ORTHOPAEDIC SURGERY

## 2022-06-24 PROCEDURE — 3700000001 HC ADD 15 MINUTES (ANESTHESIA): Performed by: ORTHOPAEDIC SURGERY

## 2022-06-24 PROCEDURE — 6360000002 HC RX W HCPCS

## 2022-06-24 PROCEDURE — 6360000002 HC RX W HCPCS: Performed by: ANESTHESIOLOGY

## 2022-06-24 PROCEDURE — 84703 CHORIONIC GONADOTROPIN ASSAY: CPT

## 2022-06-24 PROCEDURE — 2709999900 HC NON-CHARGEABLE SUPPLY: Performed by: ORTHOPAEDIC SURGERY

## 2022-06-24 PROCEDURE — 7100000000 HC PACU RECOVERY - FIRST 15 MIN: Performed by: ORTHOPAEDIC SURGERY

## 2022-06-24 RX ORDER — SODIUM CHLORIDE 9 MG/ML
INJECTION, SOLUTION INTRAVENOUS PRN
Status: DISCONTINUED | OUTPATIENT
Start: 2022-06-24 | End: 2022-06-24 | Stop reason: HOSPADM

## 2022-06-24 RX ORDER — LIDOCAINE HYDROCHLORIDE 20 MG/ML
INJECTION, SOLUTION EPIDURAL; INFILTRATION; INTRACAUDAL; PERINEURAL PRN
Status: DISCONTINUED | OUTPATIENT
Start: 2022-06-24 | End: 2022-06-24 | Stop reason: SDUPTHER

## 2022-06-24 RX ORDER — SODIUM CHLORIDE 0.9 % (FLUSH) 0.9 %
5-40 SYRINGE (ML) INJECTION EVERY 12 HOURS SCHEDULED
Status: DISCONTINUED | OUTPATIENT
Start: 2022-06-24 | End: 2022-06-24 | Stop reason: HOSPADM

## 2022-06-24 RX ORDER — SODIUM CHLORIDE 0.9 % (FLUSH) 0.9 %
5-40 SYRINGE (ML) INJECTION PRN
Status: DISCONTINUED | OUTPATIENT
Start: 2022-06-24 | End: 2022-06-24 | Stop reason: HOSPADM

## 2022-06-24 RX ORDER — SODIUM CHLORIDE 9 MG/ML
INJECTION, SOLUTION INTRAVENOUS CONTINUOUS
Status: DISCONTINUED | OUTPATIENT
Start: 2022-06-24 | End: 2022-06-24 | Stop reason: HOSPADM

## 2022-06-24 RX ORDER — MEPERIDINE HYDROCHLORIDE 25 MG/ML
12.5 INJECTION INTRAMUSCULAR; INTRAVENOUS; SUBCUTANEOUS
Status: DISCONTINUED | OUTPATIENT
Start: 2022-06-24 | End: 2022-06-24 | Stop reason: HOSPADM

## 2022-06-24 RX ORDER — FENTANYL CITRATE 50 UG/ML
25 INJECTION, SOLUTION INTRAMUSCULAR; INTRAVENOUS EVERY 5 MIN PRN
Status: DISCONTINUED | OUTPATIENT
Start: 2022-06-24 | End: 2022-06-24 | Stop reason: HOSPADM

## 2022-06-24 RX ORDER — PROPOFOL 10 MG/ML
INJECTION, EMULSION INTRAVENOUS CONTINUOUS PRN
Status: DISCONTINUED | OUTPATIENT
Start: 2022-06-24 | End: 2022-06-24 | Stop reason: SDUPTHER

## 2022-06-24 RX ORDER — FENTANYL CITRATE 50 UG/ML
50 INJECTION, SOLUTION INTRAMUSCULAR; INTRAVENOUS EVERY 5 MIN PRN
Status: DISCONTINUED | OUTPATIENT
Start: 2022-06-24 | End: 2022-06-24 | Stop reason: HOSPADM

## 2022-06-24 RX ORDER — PROPOFOL 10 MG/ML
INJECTION, EMULSION INTRAVENOUS PRN
Status: DISCONTINUED | OUTPATIENT
Start: 2022-06-24 | End: 2022-06-24 | Stop reason: SDUPTHER

## 2022-06-24 RX ORDER — ONDANSETRON 2 MG/ML
4 INJECTION INTRAMUSCULAR; INTRAVENOUS
Status: DISCONTINUED | OUTPATIENT
Start: 2022-06-24 | End: 2022-06-24 | Stop reason: HOSPADM

## 2022-06-24 RX ORDER — MAGNESIUM HYDROXIDE 1200 MG/15ML
LIQUID ORAL CONTINUOUS PRN
Status: COMPLETED | OUTPATIENT
Start: 2022-06-24 | End: 2022-06-24

## 2022-06-24 RX ADMIN — LIDOCAINE HYDROCHLORIDE 100 MG: 20 INJECTION, SOLUTION EPIDURAL; INFILTRATION; INTRACAUDAL; PERINEURAL at 12:25

## 2022-06-24 RX ADMIN — PROPOFOL 50 MG: 10 INJECTION, EMULSION INTRAVENOUS at 12:26

## 2022-06-24 RX ADMIN — PROPOFOL 150 MG: 10 INJECTION, EMULSION INTRAVENOUS at 12:25

## 2022-06-24 RX ADMIN — SODIUM CHLORIDE: 9 INJECTION, SOLUTION INTRAVENOUS at 11:04

## 2022-06-24 RX ADMIN — PROPOFOL 200 MCG/KG/MIN: 10 INJECTION, EMULSION INTRAVENOUS at 12:25

## 2022-06-24 RX ADMIN — FENTANYL CITRATE 50 MCG: 50 INJECTION, SOLUTION INTRAMUSCULAR; INTRAVENOUS at 13:18

## 2022-06-24 RX ADMIN — FENTANYL CITRATE 50 MCG: 50 INJECTION, SOLUTION INTRAMUSCULAR; INTRAVENOUS at 13:05

## 2022-06-24 ASSESSMENT — PAIN SCALES - GENERAL
PAINLEVEL_OUTOF10: 8
PAINLEVEL_OUTOF10: 0
PAINLEVEL_OUTOF10: 0
PAINLEVEL_OUTOF10: 2
PAINLEVEL_OUTOF10: 8

## 2022-06-24 ASSESSMENT — PAIN DESCRIPTION - LOCATION
LOCATION: WRIST
LOCATION: WRIST

## 2022-06-24 ASSESSMENT — PAIN DESCRIPTION - DESCRIPTORS
DESCRIPTORS: BURNING
DESCRIPTORS: BURNING

## 2022-06-24 ASSESSMENT — PAIN - FUNCTIONAL ASSESSMENT
PAIN_FUNCTIONAL_ASSESSMENT: PREVENTS OR INTERFERES SOME ACTIVE ACTIVITIES AND ADLS
PAIN_FUNCTIONAL_ASSESSMENT: PREVENTS OR INTERFERES SOME ACTIVE ACTIVITIES AND ADLS

## 2022-06-24 ASSESSMENT — PAIN DESCRIPTION - ONSET
ONSET: ON-GOING
ONSET: GRADUAL

## 2022-06-24 ASSESSMENT — PAIN DESCRIPTION - PAIN TYPE
TYPE: SURGICAL PAIN
TYPE: SURGICAL PAIN

## 2022-06-24 ASSESSMENT — LIFESTYLE VARIABLES: SMOKING_STATUS: 0

## 2022-06-24 ASSESSMENT — PAIN DESCRIPTION - ORIENTATION
ORIENTATION: RIGHT
ORIENTATION: RIGHT

## 2022-06-24 ASSESSMENT — PAIN DESCRIPTION - FREQUENCY: FREQUENCY: CONTINUOUS

## 2022-06-24 NOTE — ANESTHESIA PRE PROCEDURE
[facility] Department of Anesthesiology  Pre-Anesthesia Evaluation/Consultation       Name:  David Gallardo  : 1990  Age:  28 y.o.                                            MRN:  [id number]  Date: 2022           Surgeon: Surgeon(s):  Arvind Hanley MD    Procedure: Procedure(s):  RIGHT CARPAL TUNNEL RELEASE     Allergies   Allergen Reactions    Latex Rash    Strawberry Extract Hives     Patient Active Problem List   Diagnosis    History of gestational diabetes mellitus (GDM) in prior pregnancy, currently pregnant in second trimester    Obesity in pregnancy    Screening, , for malformation by ultrasound    Morbid obesity with BMI of 45.0-49.9, adult ([facility] [address]    Essential hypertension    Chronic GERD    Vitamin D deficiency    Carpal tunnel syndrome, bilateral    Carpal tunnel syndrome     Past Medical History:   Diagnosis Date    Abnormal heart rhythm     irregular heart rate    Abnormal Pap smear of cervix     LEEP    Bipolar 1 disorder ([facility] [address]     H/o Molar pregnancy     Hypertension     Hypoglycemia     Uterus bicornis affecting pregnancy     Wears glasses      Past Surgical History:   Procedure Laterality Date    APPENDECTOMY  2015    CARPAL TUNNEL RELEASE Left 2022    LEFT CARPAL TUNNEL RELEASE performed by Arvind Hanley MD at [address]      LEEP      TONSILLECTOMY  2016    UPPER GASTROINTESTINAL ENDOSCOPY N/A 10/22/2021    EGD BIOPSY performed by Gabriella Hurd MD at [address] History     Tobacco Use    Smoking status: Former Smoker     Packs/day: 0.50     Years: 15.00     Pack years: 7.50     Types: Cigarettes     Quit date: 10/24/2016     Years since quittin.6    Smokeless tobacco: Never Used   Vaping Use    Vaping Use: Never used   Substance Use Topics    Alcohol use: No    Drug use: Not Currently     Types: Marijuana [facility])     Comment: Prior hx of opiate use-LAST USED MARIJUANA-2020     Medications  Current Facility-Administered Medications on File Prior to Visit   Medication Dose Route Frequency Provider Last Rate Last Admin    0.9 % sodium chloride infusion   IntraVENous Continuous José Manuel Gore MD        sodium chloride flush 0.9 % injection 5-40 mL  5-40 mL IntraVENous 2 times per day José Manuel Gore MD        sodium chloride flush 0.9 % injection 5-40 mL  5-40 mL IntraVENous PRN José Manuel Gore MD        0.9 % sodium chloride infusion   IntraVENous PRN José Manuel Gore MD        sodium chloride 0.9 % irrigation    Continuous PRN Vinnie Hopkins MD   250 mL at 22 1052     Current Outpatient Medications on File Prior to Visit   Medication Sig Dispense Refill    medroxyPROGESTERone Acetate (DEPO-PROVERA IM) Inject into the muscle every 3 months      metoprolol tartrate (LOPRESSOR) 25 MG tablet Take 25 mg by mouth 2 times daily       No current facility-administered medications for this visit. No current outpatient medications on file. Facility-Administered Medications Ordered in Other Visits   Medication Dose Route Frequency Provider Last Rate Last Admin    0.9 % sodium chloride infusion   IntraVENous Continuous José Manuel Gore MD        sodium chloride flush 0.9 % injection 5-40 mL  5-40 mL IntraVENous 2 times per day José Manuel Gore MD        sodium chloride flush 0.9 % injection 5-40 mL  5-40 mL IntraVENous PRN José Manuel Gore MD        0.9 % sodium chloride infusion   IntraVENous PRN José Manuel Gore MD        sodium chloride 0.9 % irrigation    Continuous PRN Vinnie Hopkins MD   250 mL at 22 1052     Vital Signs (Current)   There were no vitals filed for this visit.                                          Vital Signs Statistics (for past 48 hrs)     Temp  Av.1 °F (36.2 °C)  Min: 97.1 °F (36.2 °C)   Min taken time: 22 1045  Max: 97.1 °F (36.2 °C)   Max taken time: 22 1045  Pulse  Av  Min: 80   Min taken time: 22 1045  Max: [de-identified]   Max taken time: 22 1045  Resp  Av  Min: 12   Min taken time: 22 1045  Max: 12   Max taken time: 22 1045  BP  Min: 144/87   Min taken time: 22 1045  Max: 144/87   Max taken time: 22 1045  SpO2  Av %  Min: 98 %   Min taken time: 22 1045  Max: 98 %   Max taken time: 22 1045  BP Readings from Last 3 Encounters:   22 (!) 144/87   22 131/84   22 127/65       BMI  There is no height or weight on file to calculate BMI. Estimated body mass index is 48.72 kg/m² as calculated from the following:    Height as of an earlier encounter on 22: 5' 1\" (1.549 m). Weight as of an earlier encounter on 22: 257 lb 13.3 oz (117 kg). CBC   Lab Results   Component Value Date    WBC 7.3 2022    RBC 4.50 2022    HGB 13.2 2022    HCT 40.0 2022    MCV 88.9 2022    RDW 13.9 2022     2022     CMP    Lab Results   Component Value Date     2022    K 4.3 2022    K 3.6 2021     2022    CO2 19 2022    BUN 11 2022    CREATININE 0.5 2022    GFRAA >60 2022    AGRATIO 1.4 2022    LABGLOM >60 2022    GLUCOSE 93 2022    PROT 7.5 2022    CALCIUM 9.5 2022    BILITOT 0.4 2022    ALKPHOS 87 2022    AST 31 2022    ALT 53 2022     BMP    Lab Results   Component Value Date     2022    K 4.3 2022    K 3.6 2021     2022    CO2 19 2022    BUN 11 2022    CREATININE 0.5 2022    CALCIUM 9.5 2022    GFRAA >60 2022    LABGLOM >60 2022    GLUCOSE 93 2022     POCGlucose  No results for input(s): GLUCOSE in the last 72 hours.    Coags    Lab Results   Component Value Date    PROTIME 11.5 2022    INR 1.02 2022    APTT 31.1      HCG (If Applicable)   Lab Results   Component Value Date    PREGTESTUR Negative 06/24/2022    HCG NEGATIVE 11/29/2016    HCGQUANT <1 11/16/2016      ABGs No results found for: PHART, PO2ART, SCY5CMR, UTY4ZNO, BEART, G1GAFIQV   Type & Screen (If Applicable)  No results found for: LABABO, LABRH                         BMI: Wt Readings from Last 3 Encounters:       NPO Status:  >8h                          Anesthesia Evaluation  Patient summary reviewed no history of anesthetic complications:   Airway: Mallampati: II  TM distance: >3 FB   Neck ROM: full  Mouth opening: > = 3 FB   Dental: normal exam         Pulmonary: breath sounds clear to auscultation      (-) COPD, asthma, sleep apnea and not a current smoker                           Cardiovascular:    (+) hypertension:,     (-) past MI, CABG/stent,  angina and no hyperlipidemia        Rate: normal                    Neuro/Psych:   (+) neuromuscular disease:, psychiatric history:   (-) seizures, TIA and CVA           GI/Hepatic/Renal:   (+) GERD:, bowel prep,      (-) liver disease, no renal disease and no morbid obesity       Endo/Other:        (-) diabetes mellitus, hypothyroidism               Abdominal:             Vascular:     - DVT and PE. Other Findings:             Anesthesia Plan      MAC     ASA 2       Induction: intravenous. Anesthetic plan and risks discussed with patient. Plan discussed with CRNA. This pre-anesthesia assessment may be used as a history and physical.    DOS STAFF ADDENDUM:    Pt seen and examined, chart reviewed (including anesthesia, drug and allergy history). No interval changes to history and physical examination. Anesthetic plan, risks, benefits, alternatives, and personnel involved discussed with patient. Patient verbalized an understanding and agrees to proceed.       Satni Norman MD  June 24, 2022  11:01 AM

## 2022-06-24 NOTE — OP NOTE
OPERATIVE REPORT          Patient:  Pierce Keating    YOB: 1990  Date of Service:  6/24/2022   Location:  [address] OR    Preoperative Diagnosis:    Right carpal tunnel syndrome    Postoperative Diagnosis:    Same    Procedure:    Right carpal tunnel release      Surgeon:    Rah Neumann. Cari Medina MD    Surgical Assistant:    RIOS Macario Assistant    Anesthesia:   Local with Sedation    Blood Loss:   Minimal    Complications:  None    Tourniquet Time: 3 minutes     Indications:  Ms. Pierce Keating  is a 28y.o. year-old female with Right carpal tunnel syndrome. I have discussed preoperatively with her  the complications, limitations, expectations, alternatives and risks of surgical care, which she has understood. All of her questions have been fully answered, and she has provided written informed consent to proceed. Procedure:   After written consent was obtained and the proper operative site was identified and marked, Ms. Israel Tay was brought to the operating room, placed in the supine position on the operating room table with the Right arm extended upon a hand table. Under an appropriate level of sedation, local anesthetic (1% Lidocaine and 1/2% Marcaine both without Epinephrine) was instilled in the planned surgical field. Her Right upper extremity was prepped and draped in the usual sterile fashion. After Esmarch exsanguination, the pneumotourniquet was inflated to 250 mm of mercury. A 2 cm longitudinal incision was fashioned at the base of the palm, paralleling the longitudinal thenar crease. Dissection was carried carefully through the subcutaneous tissue identifying and protecting the neurovascular structures. The palmar fascia was incised longitudinally, exposing the transverse carpal ligament. The transverse carpal ligament was incised from its proximal to distal most extent, under direct visualization.  The terminal 2 cm of antebrachial fascia was similarly incised under direct visualization. The contents of the carpal tunnel were inspected and found to be free of mass, lesion or other abnormality. Digital palpation revealed no further constriction about the median nerve. The wound was irrigated copiously with sterile saline for irrigation and the pneumotourniquet was deflated after a period of 3 minutes elevation. The fingers were immediately pink & well perfused. Hemostasis was easily obtained with direct pressure and electrocautery and the wound was closed with interrupted sutures. The wound was dressed with adaptic, dry sterile dressings and a bulky soft hand & wrist dressing was applied. Ms. Cindy Monzon  was awakened from light sedation, having tolerated the procedure without apparent complication. She  was returned to the recovery room in stable condition. At the conclusion of the procedure all needle, instrument, and sponge counts were correct. Rd Edmond MD   6/24/2022, 12:39 PM

## 2022-06-24 NOTE — ANESTHESIA POSTPROCEDURE EVALUATION
Department of Anesthesiology  Postprocedure Note    Patient: Sally Ballesteros  MRN: [id number]  YOB: 1990  Date of evaluation: 6/24/2022      Procedure Summary     Date: 06/24/22 Room / Location: [address]    Anesthesia Start: 2514 Anesthesia Stop: 8166    Procedure: RIGHT CARPAL TUNNEL RELEASE (Right Wrist) Diagnosis:       Carpal tunnel syndrome on right      (RIGHT CARPAL TUNNEL SYNDROME)    Surgeons: Ricardo Putnam MD Responsible Provider: Srikanth Lechuga MD    Anesthesia Type: MAC ASA Status: 2          Anesthesia Type: No value filed.     Maikol Phase I: Maikol Score: 10    Maikol Phase II:        Anesthesia Post Evaluation    Patient location during evaluation: PACU  Patient participation: complete - patient participated  Level of consciousness: awake and alert  Pain score: 2  Airway patency: patent  Nausea & Vomiting: no nausea and no vomiting  Complications: no  Cardiovascular status: blood pressure returned to baseline  Respiratory status: acceptable  Hydration status: euvolemic

## 2022-06-24 NOTE — PROGRESS NOTES
Arrived to Phase 2 recovery per stretcher from PACU post Serjio Castanon. Alert. Right arm elevated on pillows . Dressing intact.

## 2022-07-01 ENCOUNTER — OFFICE VISIT (OUTPATIENT)
Dept: ORTHOPEDIC SURGERY | Age: 32
End: 2022-07-01

## 2022-07-01 VITALS — BODY MASS INDEX: 48.52 KG/M2 | HEIGHT: 61 IN | WEIGHT: 257 LBS

## 2022-07-01 DIAGNOSIS — Z98.890 STATUS POST CARPAL TUNNEL RELEASE: Primary | ICD-10-CM

## 2022-07-01 PROCEDURE — 99024 POSTOP FOLLOW-UP VISIT: CPT | Performed by: NURSE PRACTITIONER

## 2022-07-01 NOTE — PATIENT INSTRUCTIONS
Postoperative Instructions After Carpal Tunnel Release    Dr. Sal Licona        1. After bandages are removed one week from surgery, you may chose to wear a small bandage over the incision if you wish, though you do not need to. 2. Keep incision dry until sutures have fully dissolved  or it has been 12 - 14 days since your surgery. Thereafter, you may wash with mild soap and water and shower normally. 3. Work hard on motion of the fingers and wrist, straightening each finger fully and bending each finger fully, bending wrist forward and bending wrist backwards. Do not be concerned if you experience discomfort. This will not damage the surgery. 4. You may begin using the hand as it feels comfortable beginning 12 - 14 days from the day of surgery. You may not feel entirely comfortable gripping or lifting heavy objects for several weeks. 5. You may expect to see some skin peel off around the incision. You may be left with a small area of pink baby skin. This is quite normal.  6. Once your stiches have fully disappeared & skin appears normal, you should begin gently massaging the incision with Vitamin E (may use Vitamin E lotion or contents of Vitamin E capsule). Thank you for choosing [facility]) Physicians for your Hand and Upper Extremity needs. If we can be of any further assistance to you, please do not hesitate to contact us.     Office Phone Number:  [phone]  or  [phone]

## 2022-07-04 ASSESSMENT — ENCOUNTER SYMPTOMS
COUGH: 0
GASTROINTESTINAL NEGATIVE: 1
ALLERGIC/IMMUNOLOGIC NEGATIVE: 1
EYES NEGATIVE: 1
RESPIRATORY NEGATIVE: 1
SHORTNESS OF BREATH: 0

## 2022-07-04 NOTE — PROGRESS NOTES
[facility]) Physicians   [employer]    Subjective:      Patient ID: Arin Christensen is a 28 y.o. female    HPI    The patient is here for their bariatric surgery preoperative education group visit. She has made several attempts at weight loss in the past without success and now has been scheduled to have bariatric surgery on August 3, 2022 for future weight loss. She is working to change her dietary behaviors and lose weight to improve comorbid conditions such as hypertension and GERD. Patient had recent infection following carpel tunnel surgery. Has resolved with antibiotics. Arin Christensen is a very pleasant 28 y.o. female with Body mass index is 49.24 kg/m². Past Medical History:   Diagnosis Date    Abnormal heart rhythm     irregular heart rate    Abnormal Pap smear of cervix 2015    LEEP    Bipolar 1 disorder ([address]     H/o Molar pregnancy     Hypertension     Hypoglycemia     Uterus bicornis affecting pregnancy     Wears glasses      Past Surgical History:   Procedure Laterality Date    APPENDECTOMY  2015    CARPAL TUNNEL RELEASE Left 2022    LEFT CARPAL TUNNEL RELEASE performed by Jackie Mitchell MD at [address] [place] [address] Right 2022    RIGHT CARPAL TUNNEL RELEASE performed by Jackie Mitchell MD at [address] [facility]  2016    DILATION AND CURETTAGE OF UTERUS  2011    LEEP      TONSILLECTOMY  2016    UPPER GASTROINTESTINAL ENDOSCOPY N/A 10/22/2021    EGD BIOPSY performed by Lucina Lange MD at [address] [place] [address]     Family History   Problem Relation Age of Onset    Diabetes Mother     No Known Problems Father      Social History     Tobacco Use    Smoking status: Former     Packs/day: 0.50     Years: 15.00     Pack years: 7.50     Types: Cigarettes     Quit date: 10/24/2016     Years since quittin.7    Smokeless tobacco: Never   Substance Use Topics    Alcohol use: No     I counseled the patient on the importance of not smoking and risks of ETOH. 105 03/16/2022 12:54 PM    LABVLDL 16 03/16/2022 12:54 PM     Lab Results   Component Value Date/Time    TSHREFLEX 1.89 03/16/2022 12:54 PM     Lab Results   Component Value Date/Time    IRON 89 03/16/2022 12:54 PM    TIBC 354 03/16/2022 12:54 PM    LABIRON 25 03/16/2022 12:54 PM     Lab Results   Component Value Date/Time    QQYAGCMI97 573 03/16/2022 12:54 PM    FOLATE 7.52 03/16/2022 12:54 PM     Lab Results   Component Value Date/Time    VITD25 18.7 03/16/2022 12:54 PM     Lab Results   Component Value Date/Time    LABA1C 5.4 03/16/2022 12:54 PM    .3 03/16/2022 12:54 PM       Review of Systems   Constitutional: Negative. Negative for chills, fatigue and fever. HENT: Negative. Eyes: Negative. Respiratory: Negative. Negative for cough and shortness of breath. Cardiovascular: Negative. Gastrointestinal: Negative. Endocrine: Negative. Genitourinary: Negative. Musculoskeletal: Negative. Skin: Negative. Allergic/Immunologic: Negative. Neurological: Negative. Hematological: Negative. Psychiatric/Behavioral: Negative. Objective:     Physical Exam  Vitals reviewed. Constitutional:       Appearance: She is well-developed. HENT:      Head: Normocephalic and atraumatic. Eyes:      Conjunctiva/sclera: Conjunctivae normal.      Pupils: Pupils are equal, round, and reactive to light. Pulmonary:      Effort: Pulmonary effort is normal.   Abdominal:      Palpations: Abdomen is soft. Musculoskeletal:         General: Normal range of motion. Cervical back: Normal range of motion and neck supple. Skin:     General: Skin is warm and dry. Neurological:      Mental Status: She is alert and oriented to person, place, and time. Psychiatric:         Behavior: Behavior normal.         Thought Content:  Thought content normal.         Judgment: Judgment normal.     Assessment and Plan:   Patient is here for their preoperative education group visit for sleeve gastrectomy. The patient is down 0.4 lbs today. The patient's current Body mass index is 49.24 kg/m². (7/19/22). She is making good dietary and behavior modifications and is considered to be a good surgical candidate. Patient has a diagnosis of hypertension. Reviewed the importance of checking blood pressure preoperatively and postoperatively. In addition, following up with their PCP for medication adjustments as needed. Discussed the fact that they will be required to crush or open their medications for the first two weeks after surgery and reviewed those medications that can not be crushed. Patient has a diagnosis of chronic GERD and takes a PPI. Discussed the benefits of weight loss and dietary changes on acid reflux. However, they will most likely need to continue their medication short term after surgery as they adjust to the new diet. Discussed the fact that they will be required to crush or open their medications for the first two weeks after surgery and reviewed those medications that can not be crushed. Patient received instruction that it is recommended to avoid pregnancy following bariatric surgery for at least 2 years to allow them to have stable weight loss and to help avoid increased risk of vitamin deficiencies and malnutrition. The patient was encouraged to discuss possible contraceptive methods with their PCP or OBGYN. Patient received instructions from the registered dietitian in reference to the two week preoperative diet and the four phases of their postoperative diet. In addition I reviewed these instructions and stressed the importance of following these recommendations for their safety.      Patient completed the preoperative class where they were provided with education related to their bariatric surgery, common surgical complications, medications preoperatively & postoperatively, special concerns related to bariatric surgery postoperatively, vitamin supplementation, patient agreement, PAT & scheduling, hospital course, wellness discovery program and what to do in the case of an emergency postoperatively. The dietitians reviewed all preoperative and postoperative diet instructions. Patient was given the opportunity to ask questions during the group visit and these questions were answered by myself and/or the dietitian. I spent a total of 45 minutes on the day of the visit and over half of that time was spent counseling the patient on proper dietary behaviors, exercise and surgery protocols.

## 2022-07-05 ENCOUNTER — HOSPITAL ENCOUNTER (OUTPATIENT)
Age: 32
Discharge: HOME | End: 2022-07-05
Payer: MEDICAID

## 2022-07-05 DIAGNOSIS — Z01.818 PREOP TESTING: ICD-10-CM

## 2022-07-05 LAB
AMPHETAMINE SCREEN, URINE: NORMAL
BARBITURATE SCREEN URINE: NORMAL
BENZODIAZEPINE SCREEN, URINE: NORMAL
CANNABINOID SCREEN URINE: NORMAL
COCAINE METABOLITE SCREEN URINE: NORMAL
ETHANOL: NORMAL MG/DL (ref 0–0.08)
HCG(URINE) PREGNANCY TEST: NEGATIVE
Lab: NORMAL
METHADONE SCREEN, URINE: NORMAL
OPIATE SCREEN URINE: NORMAL
OXYCODONE URINE: NORMAL
PH UA: 5.5
PHENCYCLIDINE SCREEN URINE: NORMAL
PROPOXYPHENE SCREEN: NORMAL

## 2022-07-05 PROCEDURE — G0480 DRUG TEST DEF 1-7 CLASSES: HCPCS

## 2022-07-05 PROCEDURE — 82077 ASSAY SPEC XCP UR&BREATH IA: CPT

## 2022-07-05 PROCEDURE — 84703 CHORIONIC GONADOTROPIN ASSAY: CPT

## 2022-07-05 PROCEDURE — 80307 DRUG TEST PRSMV CHEM ANLYZR: CPT

## 2022-07-05 PROCEDURE — 36415 COLL VENOUS BLD VENIPUNCTURE: CPT

## 2022-07-08 LAB
3-OH-COTININE: <2 NG/ML
COTININE: 3 NG/ML
NICOTINE: <2 NG/ML

## 2022-07-14 NOTE — PROGRESS NOTES
Patient Name:   Genell Kussmaul      Type of Surgery:  Sleeve         Date of Surgery: Wednesday August 3rd     Start Pre-Op Diet On:  Thursday July 21st      Start Clear Liquids On:  Tuesday August 2nd        NPO after midnight the night before your surgery! Take morning medications with a small amount of water.     NOTES:_______________________________________  ______________________________________________

## 2022-07-18 ENCOUNTER — TELEPHONE (OUTPATIENT)
Dept: ORTHOPEDIC SURGERY | Age: 32
End: 2022-07-18

## 2022-07-18 NOTE — TELEPHONE ENCOUNTER
Surgery and/or Procedure Scheduling     Contact Name: Nalini Hines Request sx on 6/24/22 on Rt Hand   Patient Contact Number:[phone]    PATIENT CALL SHE STATED HER FINGER ON HER RT HAND LOOK INFECTED SHE STATED IT IS RUNNING PUSS AND SHE JUST NEED A CALL BACK AS SOON AS POSSIBLE. PLEASE ADVISE.

## 2022-07-18 NOTE — TELEPHONE ENCOUNTER
Spoke with PT, she reports going to Urgent care at 0200 this morning, she was prescribed Cephlosporin and bactrim, they also asked for her to cover site with neosporin. I asked for pt to continue meds, start soaks verbal instructions provided, d/c neosporin and covering, allow site to dry fully. I also asked for her to send a picture of site and to call in a few days to see if antibiotics help if not she should come in to see us.

## 2022-07-19 ENCOUNTER — TELEPHONE (OUTPATIENT)
Dept: BARIATRICS/WEIGHT MGMT | Age: 32
End: 2022-07-19

## 2022-07-19 ENCOUNTER — OFFICE VISIT (OUTPATIENT)
Dept: BARIATRICS/WEIGHT MGMT | Age: 32
End: 2022-07-19
Payer: MEDICAID

## 2022-07-19 VITALS — BODY MASS INDEX: 49.2 KG/M2 | WEIGHT: 260.6 LBS | HEIGHT: 61 IN

## 2022-07-19 DIAGNOSIS — E66.01 MORBID OBESITY WITH BMI OF 45.0-49.9, ADULT (HCC): ICD-10-CM

## 2022-07-19 DIAGNOSIS — I10 ESSENTIAL HYPERTENSION: Primary | ICD-10-CM

## 2022-07-19 DIAGNOSIS — K21.9 CHRONIC GERD: ICD-10-CM

## 2022-07-19 PROCEDURE — 99214 OFFICE O/P EST MOD 30 MIN: CPT | Performed by: NURSE PRACTITIONER

## 2022-07-21 NOTE — PROGRESS NOTES
Name_______________________________________Printed:____________________  Date and time of surgery___8/3/22 @ 0730_____________________Arrival Time:__0600___main hosp___________   1. The instructions given regarding when and if a patient needs to stop oral intake prior to surgery varies. Follow the specific instructions you were given                  __x_Nothing to eat or to drink after Midnight the night before.                   ____Carbo loading or ERAS instructions will be given to select patients-if you have been given those instructions -please do the following                           The evening before your surgery after dinner before midnight drink 40 ounces of gatorade. If you are diabetic use sugar free. The morning of surgery drink 40 ounces of water. This needs to be finished 3 hours prior to your surgery start time. 2. Take the following pills with a small sip of water on the morning of surgery_____metoprolol______________________________________________                  Do not take blood pressure medications ending in pril or sartan the ankur prior to surgery or the morning of surgery_   3. Aspirin, Ibuprofen, Advil, Naproxen, Vitamin E and other Anti-inflammatory products and supplements should be stopped for 5 -7days before surgery or as directed by your physician. 4. Check with your Doctor regarding stopping Plavix, Coumadin,Eliquis, Lovenox,Effient,Pradaxa,Xarelto, Fragmin or other blood thinners and follow their instructions. 5. Do not smoke, and do not drink any alcoholic beverages 24 hours prior to surgery. This includes NA Beer. Refrain from the usage of any recreational drugs. 6. You may brush your teeth and gargle the morning of surgery. DO NOT SWALLOW WATER   7. You MUST make arrangements for a responsible adult to stay on site while you are here and take you home after your surgery. You will not be allowed to leave alone or drive yourself home.   It is strongly suggested someone stay with you the first 24 hrs. Your surgery will be cancelled if you do not have a ride home. 8. A parent/legal guardian must accompany a child scheduled for surgery and plan to stay at the hospital until the child is discharged. Please do not bring other children with you. 9. Please wear simple, loose fitting clothing to the hospital.  Sania Anis not bring valuables (money, credit cards, checkbooks, etc.) Do not wear any makeup (including no eye makeup) or nail polish on your fingers or toes. 10. DO NOT wear any jewelry or piercings on day of surgery. All body piercing jewelry must be removed. 11. If you have ___dentures, they will be removed before going to the OR; we will provide you a container. If you wear ___contact lenses or __x_glasses, they will be removed; please bring a case for them. 12. Please see your family doctor/pediatrician for a history & physical and/or concerning medications. Bring any test results/reports from your physician's office. PCP___dr shield_______________Phone___________H&P Appt. Date___7/26_____             13 If you  have a Living Will and Durable Power of  for Healthcare, please bring in a copy. 15. Notify your Surgeon if you develop any illness between now and surgery  time, cough, cold, fever, sore throat, nausea, vomiting, etc.  Please notify your surgeon if you experience dizziness, shortness of breath or blurred vision between now & the time of your surgery             15. DO NOT shave your operative site 96 hours prior to surgery. For face & neck surgery, men may use an electric razor 48 hours prior to surgery. 16. Shower the night before or morning of surgery using an antibacterial soap or as you have been instructed. 17. To provide excellent care visitors will be limited to one in the room at any given time. 18.  Please bring picture ID and insurance card.              19.  Visit our web site for additional information:  [email or web address]              20.During flu season no children under the age of 15 are permitted in the hospital for the safety of all patients. 21. If you take a long acting insulin in the evening only  take half of your usual  dose the night  before your procedure              22. If you use a c-pap please bring DOS if staying overnight,             23.For your convenience [id number] [place] [address] has a pharmacy on site to fill your prescriptions. 24. If you use oxygen and have a portable tank please bring it  with you the DOS             25. Bring a complete list of all your medications with name and dose include any supplements. 26. Other__________________________________________   *Please call pre admission testing if you any further questions   [address] [place]  [phone]   [address] [place] [address]       VISITOR POLICY(subject to change)    Current policy is 2 visitors per patient. No children. A mask is required. Visiting hours are 8a-8p. Overnight visitors will be at the discretion of the nurse. All above information reviewed with patient in person or by phone. Patient verbalizes understanding. All questions and concerns addressed.                                                                                                  Patient/Rep_____pt_______________                                                                                                                                    PRE OP INSTRUCTIONS

## 2022-07-22 NOTE — TELEPHONE ENCOUNTER
Ebenezer Rodriguez approval #[id number]  CPT 99807  DOS 8/3/22  Valid for 90 days    Procedure approved but inpatient stay denied. Must be 23-48 hr observation. New surgical orders marked 23 hr were faxed.

## 2022-07-26 ENCOUNTER — HOSPITAL ENCOUNTER (OUTPATIENT)
Age: 32
Discharge: HOME | End: 2022-07-26
Payer: MEDICAID

## 2022-07-26 DIAGNOSIS — Z01.818 PREOP TESTING: ICD-10-CM

## 2022-07-26 LAB
A/G RATIO: 1.4 (ref 1.1–2.2)
ABO/RH: NORMAL
ALBUMIN SERPL-MCNC: 4.5 G/DL (ref 3.4–5)
ALP BLD-CCNC: 87 U/L (ref 40–129)
ALT SERPL-CCNC: 163 U/L (ref 10–40)
ANION GAP SERPL CALCULATED.3IONS-SCNC: 14 MMOL/L (ref 3–16)
ANTIBODY SCREEN: NORMAL
AST SERPL-CCNC: 95 U/L (ref 15–37)
BILIRUB SERPL-MCNC: 0.6 MG/DL (ref 0–1)
BUN BLDV-MCNC: 9 MG/DL (ref 7–20)
CALCIUM SERPL-MCNC: 9.3 MG/DL (ref 8.3–10.6)
CHLORIDE BLD-SCNC: 104 MMOL/L (ref 99–110)
CO2: 20 MMOL/L (ref 21–32)
CREAT SERPL-MCNC: 0.5 MG/DL (ref 0.6–1.1)
GFR AFRICAN AMERICAN: >60
GFR NON-AFRICAN AMERICAN: >60
GLUCOSE BLD-MCNC: 99 MG/DL (ref 70–99)
HCT VFR BLD CALC: 39.5 % (ref 36–48)
HEMOGLOBIN: 13.3 G/DL (ref 12–16)
MCH RBC QN AUTO: 29.8 PG (ref 26–34)
MCHC RBC AUTO-ENTMCNC: 33.7 G/DL (ref 31–36)
MCV RBC AUTO: 88.3 FL (ref 80–100)
PDW BLD-RTO: 13.5 % (ref 12.4–15.4)
PLATELET # BLD: 280 K/UL (ref 135–450)
PMV BLD AUTO: 9.7 FL (ref 5–10.5)
POTASSIUM SERPL-SCNC: 4 MMOL/L (ref 3.5–5.1)
RBC # BLD: 4.48 M/UL (ref 4–5.2)
SODIUM BLD-SCNC: 138 MMOL/L (ref 136–145)
TOTAL PROTEIN: 7.7 G/DL (ref 6.4–8.2)
WBC # BLD: 6 K/UL (ref 4–11)

## 2022-07-26 PROCEDURE — 86850 RBC ANTIBODY SCREEN: CPT

## 2022-07-26 PROCEDURE — 80053 COMPREHEN METABOLIC PANEL: CPT

## 2022-07-26 PROCEDURE — 85027 COMPLETE CBC AUTOMATED: CPT

## 2022-07-26 PROCEDURE — 36415 COLL VENOUS BLD VENIPUNCTURE: CPT

## 2022-07-26 PROCEDURE — 86900 BLOOD TYPING SEROLOGIC ABO: CPT

## 2022-07-26 PROCEDURE — 86901 BLOOD TYPING SEROLOGIC RH(D): CPT

## 2022-07-27 ENCOUNTER — TELEPHONE (OUTPATIENT)
Dept: BARIATRICS/WEIGHT MGMT | Age: 32
End: 2022-07-27

## 2022-07-27 NOTE — TELEPHONE ENCOUNTER
She has been elevated in the past, just not to this level. I think we could just recheck the morning of surgery. Most likely related to fatty liver. Will place an order for a CMP the morning of surgery. We will also see if Dr. Jordana Davison is ok with that.   Thank you,  Humberto Aguilar, DIANEC

## 2022-07-27 NOTE — PROGRESS NOTES
Reviewed ALT (163) and AST (95) done 7/26/22 with Dr. Martina York - no new orders received. Notified Lg Birch @ Dr. Alfredo Townsend office of lab results.

## 2022-08-03 ENCOUNTER — HOSPITAL ENCOUNTER (OUTPATIENT)
Age: 32
Setting detail: OBSERVATION
Discharge: HOME | End: 2022-08-04
Attending: SURGERY | Admitting: SURGERY
Payer: MEDICAID

## 2022-08-03 ENCOUNTER — ANESTHESIA EVENT (OUTPATIENT)
Dept: OPERATING ROOM | Age: 32
End: 2022-08-03
Payer: MEDICAID

## 2022-08-03 ENCOUNTER — ANESTHESIA (OUTPATIENT)
Dept: OPERATING ROOM | Age: 32
End: 2022-08-03
Payer: MEDICAID

## 2022-08-03 DIAGNOSIS — E66.01 MORBID OBESITY (HCC): ICD-10-CM

## 2022-08-03 DIAGNOSIS — Z98.84 S/P LAPAROSCOPIC SLEEVE GASTRECTOMY: ICD-10-CM

## 2022-08-03 DIAGNOSIS — Z01.818 PREOP TESTING: Primary | ICD-10-CM

## 2022-08-03 LAB
A/G RATIO: 1.1 (ref 1.1–2.2)
ABO/RH: NORMAL
ALBUMIN SERPL-MCNC: 4.1 G/DL (ref 3.4–5)
ALP BLD-CCNC: 94 U/L (ref 40–129)
ALT SERPL-CCNC: 87 U/L (ref 10–40)
ANION GAP SERPL CALCULATED.3IONS-SCNC: 9 MMOL/L (ref 3–16)
ANTIBODY SCREEN: NORMAL
AST SERPL-CCNC: 43 U/L (ref 15–37)
BILIRUB SERPL-MCNC: 0.4 MG/DL (ref 0–1)
BUN BLDV-MCNC: 12 MG/DL (ref 7–20)
CALCIUM SERPL-MCNC: 9.3 MG/DL (ref 8.3–10.6)
CHLORIDE BLD-SCNC: 108 MMOL/L (ref 99–110)
CO2: 26 MMOL/L (ref 21–32)
CREAT SERPL-MCNC: 0.6 MG/DL (ref 0.6–1.1)
GFR AFRICAN AMERICAN: >60
GFR NON-AFRICAN AMERICAN: >60
GLUCOSE BLD-MCNC: 195 MG/DL (ref 70–99)
GLUCOSE BLD-MCNC: 96 MG/DL (ref 70–99)
GLUCOSE BLD-MCNC: 98 MG/DL (ref 70–99)
HCG(URINE) PREGNANCY TEST: NEGATIVE
PERFORMED ON: ABNORMAL
PERFORMED ON: NORMAL
POTASSIUM SERPL-SCNC: 3.7 MMOL/L (ref 3.5–5.1)
SODIUM BLD-SCNC: 143 MMOL/L (ref 136–145)
TOTAL PROTEIN: 7.7 G/DL (ref 6.4–8.2)

## 2022-08-03 PROCEDURE — 2580000003 HC RX 258: Performed by: SURGERY

## 2022-08-03 PROCEDURE — 80053 COMPREHEN METABOLIC PANEL: CPT

## 2022-08-03 PROCEDURE — 1200000000 HC SEMI PRIVATE

## 2022-08-03 PROCEDURE — 3700000001 HC ADD 15 MINUTES (ANESTHESIA): Performed by: SURGERY

## 2022-08-03 PROCEDURE — 7100000000 HC PACU RECOVERY - FIRST 15 MIN: Performed by: SURGERY

## 2022-08-03 PROCEDURE — G0378 HOSPITAL OBSERVATION PER HR: HCPCS

## 2022-08-03 PROCEDURE — 2500000003 HC RX 250 WO HCPCS: Performed by: SURGERY

## 2022-08-03 PROCEDURE — 6360000002 HC RX W HCPCS: Performed by: SURGERY

## 2022-08-03 PROCEDURE — 6360000002 HC RX W HCPCS

## 2022-08-03 PROCEDURE — 86900 BLOOD TYPING SEROLOGIC ABO: CPT

## 2022-08-03 PROCEDURE — C9113 INJ PANTOPRAZOLE SODIUM, VIA: HCPCS | Performed by: SURGERY

## 2022-08-03 PROCEDURE — 2720000010 HC SURG SUPPLY STERILE: Performed by: SURGERY

## 2022-08-03 PROCEDURE — 84703 CHORIONIC GONADOTROPIN ASSAY: CPT

## 2022-08-03 PROCEDURE — 36415 COLL VENOUS BLD VENIPUNCTURE: CPT

## 2022-08-03 PROCEDURE — 94760 N-INVAS EAR/PLS OXIMETRY 1: CPT

## 2022-08-03 PROCEDURE — 6370000000 HC RX 637 (ALT 250 FOR IP): Performed by: SURGERY

## 2022-08-03 PROCEDURE — 96372 THER/PROPH/DIAG INJ SC/IM: CPT

## 2022-08-03 PROCEDURE — 2709999900 HC NON-CHARGEABLE SUPPLY: Performed by: SURGERY

## 2022-08-03 PROCEDURE — A4216 STERILE WATER/SALINE, 10 ML: HCPCS | Performed by: SURGERY

## 2022-08-03 PROCEDURE — 3600000015 HC SURGERY LEVEL 5 ADDTL 15MIN: Performed by: SURGERY

## 2022-08-03 PROCEDURE — 2580000003 HC RX 258: Performed by: NURSE ANESTHETIST, CERTIFIED REGISTERED

## 2022-08-03 PROCEDURE — 6360000002 HC RX W HCPCS: Performed by: ANESTHESIOLOGY

## 2022-08-03 PROCEDURE — 88307 TISSUE EXAM BY PATHOLOGIST: CPT

## 2022-08-03 PROCEDURE — 86901 BLOOD TYPING SEROLOGIC RH(D): CPT

## 2022-08-03 PROCEDURE — 43775 LAP SLEEVE GASTRECTOMY: CPT | Performed by: SURGERY

## 2022-08-03 PROCEDURE — 86850 RBC ANTIBODY SCREEN: CPT

## 2022-08-03 PROCEDURE — 3700000000 HC ANESTHESIA ATTENDED CARE: Performed by: SURGERY

## 2022-08-03 PROCEDURE — 2500000003 HC RX 250 WO HCPCS: Performed by: NURSE ANESTHETIST, CERTIFIED REGISTERED

## 2022-08-03 PROCEDURE — A4217 STERILE WATER/SALINE, 500 ML: HCPCS | Performed by: SURGERY

## 2022-08-03 PROCEDURE — 6360000002 HC RX W HCPCS: Performed by: NURSE ANESTHETIST, CERTIFIED REGISTERED

## 2022-08-03 PROCEDURE — 3600000005 HC SURGERY LEVEL 5 BASE: Performed by: SURGERY

## 2022-08-03 PROCEDURE — 7100000001 HC PACU RECOVERY - ADDTL 15 MIN: Performed by: SURGERY

## 2022-08-03 RX ORDER — LABETALOL HYDROCHLORIDE 5 MG/ML
5 INJECTION, SOLUTION INTRAVENOUS
Status: DISCONTINUED | OUTPATIENT
Start: 2022-08-03 | End: 2022-08-03 | Stop reason: HOSPADM

## 2022-08-03 RX ORDER — SODIUM CHLORIDE 0.9 % (FLUSH) 0.9 %
5-40 SYRINGE (ML) INJECTION PRN
Status: DISCONTINUED | OUTPATIENT
Start: 2022-08-03 | End: 2022-08-04 | Stop reason: HOSPADM

## 2022-08-03 RX ORDER — MAGNESIUM HYDROXIDE 1200 MG/15ML
LIQUID ORAL CONTINUOUS PRN
Status: COMPLETED | OUTPATIENT
Start: 2022-08-03 | End: 2022-08-03

## 2022-08-03 RX ORDER — METOPROLOL TARTRATE 5 MG/5ML
2.5 INJECTION INTRAVENOUS EVERY 6 HOURS
Status: DISCONTINUED | OUTPATIENT
Start: 2022-08-03 | End: 2022-08-04

## 2022-08-03 RX ORDER — PROMETHAZINE HYDROCHLORIDE 25 MG/ML
6.25 INJECTION, SOLUTION INTRAMUSCULAR; INTRAVENOUS EVERY 6 HOURS PRN
Status: DISCONTINUED | OUTPATIENT
Start: 2022-08-03 | End: 2022-08-04 | Stop reason: HOSPADM

## 2022-08-03 RX ORDER — DEXAMETHASONE SODIUM PHOSPHATE 4 MG/ML
INJECTION, SOLUTION INTRA-ARTICULAR; INTRALESIONAL; INTRAMUSCULAR; INTRAVENOUS; SOFT TISSUE PRN
Status: DISCONTINUED | OUTPATIENT
Start: 2022-08-03 | End: 2022-08-03 | Stop reason: SDUPTHER

## 2022-08-03 RX ORDER — METOCLOPRAMIDE HYDROCHLORIDE 5 MG/ML
10 INJECTION INTRAMUSCULAR; INTRAVENOUS EVERY 6 HOURS PRN
Status: DISCONTINUED | OUTPATIENT
Start: 2022-08-03 | End: 2022-08-04 | Stop reason: HOSPADM

## 2022-08-03 RX ORDER — LABETALOL HYDROCHLORIDE 5 MG/ML
10 INJECTION, SOLUTION INTRAVENOUS
Status: DISCONTINUED | OUTPATIENT
Start: 2022-08-03 | End: 2022-08-04 | Stop reason: HOSPADM

## 2022-08-03 RX ORDER — VECURONIUM BROMIDE 1 MG/ML
INJECTION, POWDER, LYOPHILIZED, FOR SOLUTION INTRAVENOUS PRN
Status: DISCONTINUED | OUTPATIENT
Start: 2022-08-03 | End: 2022-08-03 | Stop reason: SDUPTHER

## 2022-08-03 RX ORDER — LIDOCAINE HYDROCHLORIDE 20 MG/ML
INJECTION, SOLUTION EPIDURAL; INFILTRATION; INTRACAUDAL; PERINEURAL PRN
Status: DISCONTINUED | OUTPATIENT
Start: 2022-08-03 | End: 2022-08-03 | Stop reason: SDUPTHER

## 2022-08-03 RX ORDER — MIDAZOLAM HYDROCHLORIDE 1 MG/ML
INJECTION INTRAMUSCULAR; INTRAVENOUS PRN
Status: DISCONTINUED | OUTPATIENT
Start: 2022-08-03 | End: 2022-08-03 | Stop reason: SDUPTHER

## 2022-08-03 RX ORDER — SODIUM CHLORIDE, SODIUM LACTATE, POTASSIUM CHLORIDE, CALCIUM CHLORIDE 600; 310; 30; 20 MG/100ML; MG/100ML; MG/100ML; MG/100ML
INJECTION, SOLUTION INTRAVENOUS CONTINUOUS
Status: DISCONTINUED | OUTPATIENT
Start: 2022-08-03 | End: 2022-08-03 | Stop reason: HOSPADM

## 2022-08-03 RX ORDER — APREPITANT 80 MG/1
80 CAPSULE ORAL ONCE
Status: COMPLETED | OUTPATIENT
Start: 2022-08-03 | End: 2022-08-03

## 2022-08-03 RX ORDER — HYOSCYAMINE SULFATE 0.5 MG/ML
250 INJECTION, SOLUTION SUBCUTANEOUS EVERY 4 HOURS PRN
Status: DISCONTINUED | OUTPATIENT
Start: 2022-08-03 | End: 2022-08-04 | Stop reason: HOSPADM

## 2022-08-03 RX ORDER — BUPIVACAINE HYDROCHLORIDE AND EPINEPHRINE 5; 5 MG/ML; UG/ML
INJECTION, SOLUTION PERINEURAL
Status: COMPLETED | OUTPATIENT
Start: 2022-08-03 | End: 2022-08-03

## 2022-08-03 RX ORDER — KETAMINE HCL IN NACL, ISO-OSM 100MG/10ML
SYRINGE (ML) INJECTION PRN
Status: DISCONTINUED | OUTPATIENT
Start: 2022-08-03 | End: 2022-08-03 | Stop reason: SDUPTHER

## 2022-08-03 RX ORDER — METHYLENE BLUE 10 MG/ML
INJECTION INTRAVENOUS
Status: COMPLETED | OUTPATIENT
Start: 2022-08-03 | End: 2022-08-03

## 2022-08-03 RX ORDER — NALOXONE HYDROCHLORIDE 0.4 MG/ML
INJECTION, SOLUTION INTRAMUSCULAR; INTRAVENOUS; SUBCUTANEOUS PRN
Status: DISCONTINUED | OUTPATIENT
Start: 2022-08-03 | End: 2022-08-04

## 2022-08-03 RX ORDER — MAGNESIUM SULFATE HEPTAHYDRATE 500 MG/ML
INJECTION, SOLUTION INTRAMUSCULAR; INTRAVENOUS PRN
Status: DISCONTINUED | OUTPATIENT
Start: 2022-08-03 | End: 2022-08-03 | Stop reason: SDUPTHER

## 2022-08-03 RX ORDER — ONDANSETRON 2 MG/ML
4 INJECTION INTRAMUSCULAR; INTRAVENOUS EVERY 6 HOURS PRN
Status: DISCONTINUED | OUTPATIENT
Start: 2022-08-03 | End: 2022-08-04 | Stop reason: HOSPADM

## 2022-08-03 RX ORDER — SODIUM CHLORIDE 9 MG/ML
INJECTION, SOLUTION INTRAVENOUS CONTINUOUS
Status: DISCONTINUED | OUTPATIENT
Start: 2022-08-03 | End: 2022-08-04 | Stop reason: HOSPADM

## 2022-08-03 RX ORDER — SODIUM CHLORIDE 0.9 % (FLUSH) 0.9 %
5-40 SYRINGE (ML) INJECTION EVERY 12 HOURS SCHEDULED
Status: DISCONTINUED | OUTPATIENT
Start: 2022-08-03 | End: 2022-08-04 | Stop reason: HOSPADM

## 2022-08-03 RX ORDER — GLYCOPYRROLATE 0.2 MG/ML
INJECTION INTRAMUSCULAR; INTRAVENOUS PRN
Status: DISCONTINUED | OUTPATIENT
Start: 2022-08-03 | End: 2022-08-03 | Stop reason: SDUPTHER

## 2022-08-03 RX ORDER — HYDROMORPHONE HCL 110MG/55ML
PATIENT CONTROLLED ANALGESIA SYRINGE INTRAVENOUS
Status: DISPENSED
Start: 2022-08-03 | End: 2022-08-03

## 2022-08-03 RX ORDER — DEXMEDETOMIDINE HYDROCHLORIDE 100 UG/ML
INJECTION, SOLUTION INTRAVENOUS PRN
Status: DISCONTINUED | OUTPATIENT
Start: 2022-08-03 | End: 2022-08-03 | Stop reason: SDUPTHER

## 2022-08-03 RX ORDER — ONDANSETRON 2 MG/ML
INJECTION INTRAMUSCULAR; INTRAVENOUS PRN
Status: DISCONTINUED | OUTPATIENT
Start: 2022-08-03 | End: 2022-08-03 | Stop reason: SDUPTHER

## 2022-08-03 RX ORDER — ENOXAPARIN SODIUM 100 MG/ML
30 INJECTION SUBCUTANEOUS ONCE
Status: COMPLETED | OUTPATIENT
Start: 2022-08-03 | End: 2022-08-03

## 2022-08-03 RX ORDER — HYDROMORPHONE HCL 110MG/55ML
0.25 PATIENT CONTROLLED ANALGESIA SYRINGE INTRAVENOUS EVERY 5 MIN PRN
Status: DISCONTINUED | OUTPATIENT
Start: 2022-08-03 | End: 2022-08-03 | Stop reason: HOSPADM

## 2022-08-03 RX ORDER — HYDROMORPHONE HCL 110MG/55ML
0.5 PATIENT CONTROLLED ANALGESIA SYRINGE INTRAVENOUS EVERY 5 MIN PRN
Status: DISCONTINUED | OUTPATIENT
Start: 2022-08-03 | End: 2022-08-03 | Stop reason: HOSPADM

## 2022-08-03 RX ORDER — PROPOFOL 10 MG/ML
INJECTION, EMULSION INTRAVENOUS PRN
Status: DISCONTINUED | OUTPATIENT
Start: 2022-08-03 | End: 2022-08-03 | Stop reason: SDUPTHER

## 2022-08-03 RX ORDER — ENOXAPARIN SODIUM 100 MG/ML
30 INJECTION SUBCUTANEOUS EVERY 12 HOURS SCHEDULED
Status: DISCONTINUED | OUTPATIENT
Start: 2022-08-03 | End: 2022-08-04 | Stop reason: HOSPADM

## 2022-08-03 RX ORDER — FENTANYL CITRATE 50 UG/ML
INJECTION, SOLUTION INTRAMUSCULAR; INTRAVENOUS PRN
Status: DISCONTINUED | OUTPATIENT
Start: 2022-08-03 | End: 2022-08-03 | Stop reason: SDUPTHER

## 2022-08-03 RX ORDER — OXYCODONE HYDROCHLORIDE 5 MG/1
5 TABLET ORAL
Status: DISCONTINUED | OUTPATIENT
Start: 2022-08-03 | End: 2022-08-03 | Stop reason: HOSPADM

## 2022-08-03 RX ORDER — HYDROMORPHONE HYDROCHLORIDE 1 MG/ML
0.5 INJECTION, SOLUTION INTRAMUSCULAR; INTRAVENOUS; SUBCUTANEOUS
Status: DISCONTINUED | OUTPATIENT
Start: 2022-08-03 | End: 2022-08-04 | Stop reason: HOSPADM

## 2022-08-03 RX ORDER — HYDRALAZINE HYDROCHLORIDE 20 MG/ML
10 INJECTION INTRAMUSCULAR; INTRAVENOUS
Status: DISCONTINUED | OUTPATIENT
Start: 2022-08-03 | End: 2022-08-04 | Stop reason: HOSPADM

## 2022-08-03 RX ORDER — SODIUM CHLORIDE 9 MG/ML
INJECTION, SOLUTION INTRAVENOUS CONTINUOUS
Status: CANCELLED | OUTPATIENT
Start: 2022-08-03

## 2022-08-03 RX ORDER — ONDANSETRON 2 MG/ML
INJECTION INTRAMUSCULAR; INTRAVENOUS
Status: COMPLETED
Start: 2022-08-03 | End: 2022-08-03

## 2022-08-03 RX ORDER — SUCCINYLCHOLINE/SOD CL,ISO/PF 200MG/10ML
SYRINGE (ML) INTRAVENOUS PRN
Status: DISCONTINUED | OUTPATIENT
Start: 2022-08-03 | End: 2022-08-03 | Stop reason: SDUPTHER

## 2022-08-03 RX ORDER — SODIUM CHLORIDE, SODIUM LACTATE, POTASSIUM CHLORIDE, CALCIUM CHLORIDE 600; 310; 30; 20 MG/100ML; MG/100ML; MG/100ML; MG/100ML
INJECTION, SOLUTION INTRAVENOUS CONTINUOUS
Status: DISCONTINUED | OUTPATIENT
Start: 2022-08-03 | End: 2022-08-04 | Stop reason: HOSPADM

## 2022-08-03 RX ORDER — LIDOCAINE HYDROCHLORIDE 10 MG/ML
1 INJECTION, SOLUTION EPIDURAL; INFILTRATION; INTRACAUDAL; PERINEURAL
Status: CANCELLED | OUTPATIENT
Start: 2022-08-03 | End: 2022-08-03

## 2022-08-03 RX ORDER — SODIUM CHLORIDE 9 MG/ML
INJECTION, SOLUTION INTRAVENOUS CONTINUOUS PRN
Status: DISCONTINUED | OUTPATIENT
Start: 2022-08-03 | End: 2022-08-03 | Stop reason: SDUPTHER

## 2022-08-03 RX ORDER — DIPHENHYDRAMINE HYDROCHLORIDE 50 MG/ML
12.5 INJECTION INTRAMUSCULAR; INTRAVENOUS EVERY 6 HOURS PRN
Status: DISCONTINUED | OUTPATIENT
Start: 2022-08-03 | End: 2022-08-04 | Stop reason: HOSPADM

## 2022-08-03 RX ORDER — ONDANSETRON 2 MG/ML
4 INJECTION INTRAMUSCULAR; INTRAVENOUS
Status: COMPLETED | OUTPATIENT
Start: 2022-08-03 | End: 2022-08-03

## 2022-08-03 RX ORDER — SCOLOPAMINE TRANSDERMAL SYSTEM 1 MG/1
1 PATCH, EXTENDED RELEASE TRANSDERMAL ONCE
Status: DISCONTINUED | OUTPATIENT
Start: 2022-08-03 | End: 2022-08-03

## 2022-08-03 RX ORDER — PHENYLEPHRINE HYDROCHLORIDE 10 MG/ML
INJECTION INTRAVENOUS PRN
Status: DISCONTINUED | OUTPATIENT
Start: 2022-08-03 | End: 2022-08-03 | Stop reason: SDUPTHER

## 2022-08-03 RX ORDER — LIDOCAINE 4 G/G
1 PATCH TOPICAL DAILY
Status: DISCONTINUED | OUTPATIENT
Start: 2022-08-03 | End: 2022-08-04 | Stop reason: HOSPADM

## 2022-08-03 RX ORDER — SCOLOPAMINE TRANSDERMAL SYSTEM 1 MG/1
1 PATCH, EXTENDED RELEASE TRANSDERMAL
Status: DISCONTINUED | OUTPATIENT
Start: 2022-08-03 | End: 2022-08-04 | Stop reason: HOSPADM

## 2022-08-03 RX ORDER — LIDOCAINE HYDROCHLORIDE 10 MG/ML
0.5 INJECTION, SOLUTION EPIDURAL; INFILTRATION; INTRACAUDAL; PERINEURAL ONCE
Status: DISCONTINUED | OUTPATIENT
Start: 2022-08-03 | End: 2022-08-03 | Stop reason: HOSPADM

## 2022-08-03 RX ORDER — SODIUM CHLORIDE 9 MG/ML
INJECTION, SOLUTION INTRAVENOUS PRN
Status: DISCONTINUED | OUTPATIENT
Start: 2022-08-03 | End: 2022-08-04 | Stop reason: HOSPADM

## 2022-08-03 RX ADMIN — FENTANYL CITRATE 50 MCG: 50 INJECTION, SOLUTION INTRAMUSCULAR; INTRAVENOUS at 08:45

## 2022-08-03 RX ADMIN — PHENYLEPHRINE HYDROCHLORIDE 200 MCG: 10 INJECTION INTRAVENOUS at 08:25

## 2022-08-03 RX ADMIN — GLYCOPYRROLATE 0.2 MG: 0.2 INJECTION INTRAMUSCULAR; INTRAVENOUS at 08:25

## 2022-08-03 RX ADMIN — SODIUM CHLORIDE, PRESERVATIVE FREE 40 MG: 5 INJECTION INTRAVENOUS at 14:44

## 2022-08-03 RX ADMIN — GLYCOPYRROLATE 0.2 MG: 0.2 INJECTION INTRAMUSCULAR; INTRAVENOUS at 08:04

## 2022-08-03 RX ADMIN — LIDOCAINE HYDROCHLORIDE 100 MG: 20 INJECTION, SOLUTION EPIDURAL; INFILTRATION; INTRACAUDAL; PERINEURAL at 07:39

## 2022-08-03 RX ADMIN — SODIUM CHLORIDE: 9 INJECTION, SOLUTION INTRAVENOUS at 07:30

## 2022-08-03 RX ADMIN — Medication 180 MG: at 07:39

## 2022-08-03 RX ADMIN — PHENYLEPHRINE HYDROCHLORIDE 200 MCG: 10 INJECTION INTRAVENOUS at 08:04

## 2022-08-03 RX ADMIN — ONDANSETRON 4 MG: 2 INJECTION INTRAMUSCULAR; INTRAVENOUS at 09:51

## 2022-08-03 RX ADMIN — Medication 10 MG: at 09:47

## 2022-08-03 RX ADMIN — SODIUM CHLORIDE, POTASSIUM CHLORIDE, SODIUM LACTATE AND CALCIUM CHLORIDE: 600; 310; 30; 20 INJECTION, SOLUTION INTRAVENOUS at 20:54

## 2022-08-03 RX ADMIN — VECURONIUM BROMIDE 10 MG: 1 INJECTION, POWDER, LYOPHILIZED, FOR SOLUTION INTRAVENOUS at 07:45

## 2022-08-03 RX ADMIN — SODIUM CHLORIDE, POTASSIUM CHLORIDE, SODIUM LACTATE AND CALCIUM CHLORIDE: 600; 310; 30; 20 INJECTION, SOLUTION INTRAVENOUS at 06:35

## 2022-08-03 RX ADMIN — ONDANSETRON 4 MG: 2 INJECTION INTRAMUSCULAR; INTRAVENOUS at 14:45

## 2022-08-03 RX ADMIN — PROPOFOL 50 MG: 10 INJECTION, EMULSION INTRAVENOUS at 08:49

## 2022-08-03 RX ADMIN — DEXAMETHASONE SODIUM PHOSPHATE 8 MG: 4 INJECTION, SOLUTION INTRAMUSCULAR; INTRAVENOUS at 07:45

## 2022-08-03 RX ADMIN — MIDAZOLAM 1 MG: 1 INJECTION INTRAMUSCULAR; INTRAVENOUS at 07:28

## 2022-08-03 RX ADMIN — ENOXAPARIN SODIUM 30 MG: 100 INJECTION SUBCUTANEOUS at 20:41

## 2022-08-03 RX ADMIN — ENOXAPARIN SODIUM 30 MG: 100 INJECTION SUBCUTANEOUS at 06:34

## 2022-08-03 RX ADMIN — HYDROMORPHONE HYDROCHLORIDE 0.5 MG: 2 INJECTION, SOLUTION INTRAMUSCULAR; INTRAVENOUS; SUBCUTANEOUS at 09:24

## 2022-08-03 RX ADMIN — ONDANSETRON 4 MG: 2 INJECTION INTRAMUSCULAR; INTRAVENOUS at 07:45

## 2022-08-03 RX ADMIN — APREPITANT 80 MG: 80 CAPSULE ORAL at 06:34

## 2022-08-03 RX ADMIN — METOPROLOL TARTRATE 2.5 MG: 5 INJECTION INTRAVENOUS at 14:43

## 2022-08-03 RX ADMIN — HYDROMORPHONE HYDROCHLORIDE 0.5 MG: 2 INJECTION, SOLUTION INTRAMUSCULAR; INTRAVENOUS; SUBCUTANEOUS at 09:09

## 2022-08-03 RX ADMIN — DEXMEDETOMIDINE HYDROCHLORIDE 10 MCG: 100 INJECTION, SOLUTION INTRAVENOUS at 07:55

## 2022-08-03 RX ADMIN — PROPOFOL 200 MG: 10 INJECTION, EMULSION INTRAVENOUS at 07:39

## 2022-08-03 RX ADMIN — CEFAZOLIN 2000 MG: 2 INJECTION, POWDER, FOR SOLUTION INTRAMUSCULAR; INTRAVENOUS at 07:25

## 2022-08-03 RX ADMIN — DEXMEDETOMIDINE HYDROCHLORIDE 10 MCG: 100 INJECTION, SOLUTION INTRAVENOUS at 08:20

## 2022-08-03 RX ADMIN — MAGNESIUM SULFATE HEPTAHYDRATE 1 G: 500 INJECTION, SOLUTION INTRAMUSCULAR; INTRAVENOUS at 07:45

## 2022-08-03 RX ADMIN — FENTANYL CITRATE 50 MCG: 50 INJECTION, SOLUTION INTRAMUSCULAR; INTRAVENOUS at 07:39

## 2022-08-03 RX ADMIN — SODIUM CHLORIDE, POTASSIUM CHLORIDE, SODIUM LACTATE AND CALCIUM CHLORIDE: 600; 310; 30; 20 INJECTION, SOLUTION INTRAVENOUS at 17:10

## 2022-08-03 RX ADMIN — SUGAMMADEX 200 MG: 100 INJECTION, SOLUTION INTRAVENOUS at 08:41

## 2022-08-03 RX ADMIN — Medication 20 MG: at 07:55

## 2022-08-03 RX ADMIN — ONDANSETRON 4 MG: 2 INJECTION INTRAMUSCULAR; INTRAVENOUS at 20:50

## 2022-08-03 ASSESSMENT — PAIN DESCRIPTION - LOCATION
LOCATION: ABDOMEN
LOCATION: CHEST
LOCATION: ABDOMEN

## 2022-08-03 ASSESSMENT — PAIN SCALES - GENERAL
PAINLEVEL_OUTOF10: 8
PAINLEVEL_OUTOF10: 8
PAINLEVEL_OUTOF10: 0
PAINLEVEL_OUTOF10: 8
PAINLEVEL_OUTOF10: 8

## 2022-08-03 ASSESSMENT — PAIN DESCRIPTION - ONSET: ONSET: ON-GOING

## 2022-08-03 ASSESSMENT — PAIN - FUNCTIONAL ASSESSMENT
PAIN_FUNCTIONAL_ASSESSMENT: NONE - DENIES PAIN
PAIN_FUNCTIONAL_ASSESSMENT: ACTIVITIES ARE NOT PREVENTED
PAIN_FUNCTIONAL_ASSESSMENT: PREVENTS OR INTERFERES SOME ACTIVE ACTIVITIES AND ADLS

## 2022-08-03 ASSESSMENT — PAIN DESCRIPTION - DESCRIPTORS
DESCRIPTORS: DISCOMFORT
DESCRIPTORS: DISCOMFORT;TIGHTNESS

## 2022-08-03 ASSESSMENT — PAIN DESCRIPTION - FREQUENCY: FREQUENCY: INTERMITTENT

## 2022-08-03 NOTE — PROGRESS NOTES
Patient s/p Sleeve Gastrectomy. VSS. Abdomen soft, appropriately tender, incisions intact with surgical glue; no erythema. Patient ambulated within 2 hours post phase 1 care with binder in place. SCDs in place while in bed. I&O monitored. Patient instructed on Incentive Spirometer use. Pain is controlled with PCA. The care plan and education has been reviewed and mutually agreed upon with the patient. Will transfer to 4T when a bed becomes available.

## 2022-08-03 NOTE — DISCHARGE INSTRUCTIONS
Unless otherwise noted you will crush all your medications for the first 2 weeks post op and mix them with clears for the first 2 days and then with your pureed food for the remainder of the 2 weeks. If you do not tolerate your medications crushed you may quarter or cut in half and take one piece at a time. I have written for famotidine (Pepcid) which you may take daily as needed for acid reflux/heartburn. After two weeks, you may start taking all your pills whole. Please make sure when taking whole pills, you do not take them all at once. Take them one at a time and allow a minute or so between each one. Medications    As discussed during your pre-surgical visits there may need to be changes made to your home medications following surgery. A benefit of weight loss and healthy dietary habits can be an improvement in your blood pressure, blood sugars and edema (swelling). Blood Pressure    Following surgery monitor your blood pressure to evaluate for low blood pressure. A normal blood pressure is generally considered less than 120/80. If your blood pressure is lower than normal and/or youre experiencing symptoms of hypotension such as fatigue, blurred vision, lightheadedness or dizziness please contact your PCP for them to evaluate the need to lower or discontinue your blood pressure medication(s). Activity  You are encouraged to walk through the entire postoperative period as this will help with preventing clots, pneumonia and constipation. You are restricted from lifting anything greater than 10 lbs for the first 6 weeks after surgery. You may shower starting on postoperative day #2 (second day after surgery), but should not get into baths, pools and/or hot tubs until the provider releases you to do so. Driving  You should not drive for at least the first week after your surgery and/or if you are still taking pain medication.  Most patients generally drive to their 2-week postoperative appointment. Constipation  Constipation can occur following surgery. This is due to anesthesia, decreased fluids and fiber in your diet, drinking protein shakes and taking pain medication. Make sure you are getting 48-64 ounces of fluids per day and walking. If you develop constipation you may use docusate sodium (Colace) or Dulcolax which are over-the-counter stool softeners and can be taken 1-2 times per day. If the stool softeners do not help in 2-3 days you can use Miralax which is an over-the-counter laxative and can be taken daily. If this does not help please call the office for further instructions. Pain Management  Following surgery, you will have some pain related to your incisions and from the gas instilled into your abdomen during surgery. It is important for you to walk frequently after surgery to help dissipate the gas pain and to keep your incisions from getting stiff. To help with incisional pain we will provide a prescription for pain medication, but also recommend that you ice your incisions for the days and weeks after surgery. When icing your incisions follow the recommendation of 15 minutes on/15 minutes off rotating to different areas. This will help with the swelling and inflammation related to your incisional pain. In addition, keep wearing your abdominal binder when you are up and about to help with support. Vitamins (Fusion)  When starting the Fusion multivitamins take four per day. You will take two in the morning and two in the evening. Phase I Diet  Please reference the Preoperative Class diet instructions and the Bariatric Surgery Dietary Discharge Instructions handout reviewed in the hospital. Antonio Yañez will be on a clear liquid diet for postop day one and two. Your goal is to get in 48-64oz of fluids daily. Refer to the Clear Liquids List for a list of acceptable clear liquids.     Phase II Diet  Please reference the Preoperative Class diet instructions, the Bariatric Surgery Dietary Discharge Instructions handout and the Ideal Meal Process handout reviewed in the hospital. You will start your full liquid and pureed/blenderized foods on postop day three and will continue through postop day twenty. Your priority is to still get in 48-64oz of fluids daily, but you can start back on two of your protein shakes and begin eating approved full liquid & pureed/blenderized food. Your goal is 60-80 grams of protein daily. When drinking your protein shakes you will not be able to drink them as fast as you could before surgery. However, we do not want you to drink your shakes for longer than 40 minutes, so that you are able to get back to your fluids. Food Diary  Please make sure you are keeping a food diary of everything you eat and drink starting on postop day three and bring it with you to your postop visits. Incentive Spirometer  You will continue to use your incentive spirometer (breathing device given to you in hospital) for the first two weeks after surgery to help prevent pneumonia and to open your lungs completely. You will use it ten times per hour while awake. Dermabond Topical Skin Adhesive Care    Dermabond is a clear, sterile liquid skin adhesive that holds wound/incision edges together. The adhesive will usually remain in place for 5 to 10 days, then naturally wear or slough off your skin. Do not scratch, rub, or pick at the Skin Affix adhesive film. Do not apply liquid or ointment medications, soap, powders or lotions to the incision while the Dermabond is in place. You may shower 48 hours after your surgery and briefly wet the Dermabond. Do not sit in a tub of water or swim. Do not soak or scrub your incision. After showering, gently blot your incision dry. No tape or any type of bandage/covering is required over the Dermabond. PLEASE CONTACT YOUR PRIMARY CARE PHYSICIAN AT DISCHARGE AND COME UP WITH A PLAN FOR THE MANAGEMENT OF YOUR BLOOD PRESSURE MEDICATION.  IF YOU DO NOT HAVE A WAY TO CHECK YOUR BLOOD PRESSURE AT HOME I SUGGEST THAT YOU  A BLOOD PRESSURE CUFF AT THE STORE TO MONITOR YOUR BLOOD PRESSURE SO THAT YOUR PRIMARY CARE PHYSICIAN CAN EVALUATE THE NEED TO CHANGE ANY OF YOUR MEDICATIONS.

## 2022-08-03 NOTE — PROGRESS NOTES
Nursing care provided to prep patient for surgery. Patient lost 5 lbs. on pre-op diet, informed surgeon. Pre-op orders completed. Bilateral foot pneumatic sleeves applied. Teaching / education initiated regarding perioperative experience, post-op expectations and plan of care, and pain management during hospital stay. Patient verbalized understanding. The care plan and education has been reviewed and mutually agreed upon with the patient.

## 2022-08-03 NOTE — OP NOTE
Operative Note      Patient: Asa Lat  YOB: 1990  MRN: [id number]    Date of Procedure: 8/3/2022  [facility]) Physicians   [facility]  [address] [place] [address], [address] [place] [address]    [facility] [address] . Phone: [phone]  Fax: [phone]             Procedure Note    Indications: The patient was evaluated by our multidisciplinary team and was found to be a good candidate for weight loss surgery for future weight loss. Body mass index is 49.13 kg/m². Harjit Gillis Risks and benefits explained and Radhasadia Pagan wants to proceed. Pre-operative Diagnosis:   Patient Active Problem List   Diagnosis    History of gestational diabetes mellitus (GDM) in prior pregnancy, currently pregnant in second trimester    Obesity in pregnancy    Screening, , for malformation by ultrasound    Morbid obesity with BMI of 45.0-49.9, adult ([address]    Essential hypertension    Chronic GERD    Vitamin D deficiency    Carpal tunnel syndrome, bilateral    Carpal tunnel syndrome         Post-operative Diagnosis:   Same      Procedure:    - Laparoscopic Sleeve Gastrectomy. Surgeon: Kely Moscoso MD, FACS. Anesthesia: General endotracheal anesthesia        Procedure Details   The patient was seen again in the Holding Room. The risks, benefits, complications, treatment options, and expected outcomes were discussed with the patient and/or family. Including but not limited to; The possibilities of reaction to medication, pulmonary aspiration, perforation of viscus, bleeding, recurrent infection, strictures, leaks, failure to lose weight, regaining weight,  malnutrition, the need for additional procedures, failure to diagnose a condition, and creating a complication requiring transfusion or operation were discussed. There was concurrence with the proposed plan and informed consent was obtained. The site of surgery was properly noted/marked.  The patient was taken to Operating Room, identified as Israel Tay and the procedure verified as Laparoscopic Sleeve Gastrectomy & other indicated procedures. A Time Out was held and the above information confirmed. The patient was placed in the supine position and general anesthesia was induced, along with placement of orogastric tube, Venodyne boots. Lovenox SQ, Emend and IV Antibiotics given pre-operatively. All pressure points were padded properly. Patient prepped and draped in sterile fashion. A left upper quadrant incision was made and a veres needle was inserted after confirming with saline drop test.  After adequate pneumoperitoneum was obtained, a 5 mm trocar was inserted. This was followed by a endoscope which confirmed intra-abdominal placement and there was no injury on initial trocar placement. Additional ports were placed in the standard positions under direct vision. The abdomen was initially explored and no abnormalities were identified. A liver retractor was inserted through a small incision in the upper midline, lifted the liver upward, and was then secured to the OR table. The pylorus was identified and measurement was taken approximately 4-6 cm from the pylorus along the greater curvature of the stomach. The harmonic scalpel / ligasure was used to take down the attachments and short gastric vessels along the greater curve of the stomach. This was continued until all attachments were taken down and continued to the gastro-esophageal junction. A 34 Chinese dilator was placed along the lesser curvature and into the pylorus. A stapler was fired along the dilator to create an appropriate sized gastric sleeve pouch. Purple followed Tan firings were used to create the sleeve. The staple line looked very healthy and no bleeding from staple line. The stomach was confirmed to be completely divided with uniform shape,  no twist in the sleeve and wide patent incisura.        A 2-0 vicryl suture was used to over-sew and imbricate the sleeve staple line. The staple line was completely over-sewn. The dilator was removed and an Edlich tube was advanced across the sleeve to ensure patency mainly at incisura, then retracted to just above the GE junction. The stomach distal to the staple line was clamped and methylene blue saline was injected under pressure confirming No obstruction (flow noted to duodenum) and No leak. The abdomen was carefully inspected and there was no bleeding or any other abnormality. The stomach was brought out through the RUQ incision. Hemostasis was confirmed. Snow applied along suture line and/or biopsy sites to ensure hemostasis. The 15 and 12 port sites was closed using 0.0 Vicryl  suture at the level of the fascia and that was done under direct vision with the laparoscope to ensure proper closure and nothing entrapped. Local Anesthetic used at port sites. The trocar site skin wounds were closed using 4.0 Vicryl after copious Irrigation of the wounds. Dermabond / or steri strips applied. Instrument, sponge, and needle counts were correct at the conclusion of the case. Findings:  As above. Estimated Blood Loss:  Minimal           Drains: none           Total IV Fluids: 1000 ml           Specimens: Stomach (Subtotal)            Complications:  None; patient tolerated the procedure well. Disposition: PACU - hemodynamically stable. Condition: stable    Attending Attestation: I was present and scrubbed for the entire procedure.               Electronically signed by Jennifer Gold MD on 8/3/2022 at 8:54 AM

## 2022-08-03 NOTE — PROGRESS NOTES
Patient transferred to unit at this time. Bedside report completed. Patient ambulated from stretcher to bed. Patient s/p sleeve gastrectomy. VSS. Abdomen soft, appropriately tender, incision stable with no erythema. Patient ambulating to RR. SCD's in place while in bed. I/O being monitored, patient remains NPO. Patient instructed on proper use and frequency of incentive spirometer. Patient reports chest pain 8/10. Pain controlled with PCA. Will continue to monitor pain control. Discussed plan of care with patient, to have UGI in the morning and if normal will be able to start Phase 1 clear liquid diet. The care plan and education has been reviewed and mutually agreed upon with the patient.

## 2022-08-03 NOTE — CARE COORDINATION
Discharge Planning Note:    Chart reviewed and it appears that patient has minimal needs for discharge at this time. Discussed with patients nurse and requested that case management be notified if discharge needs are identified.     - Current discharge plan is for the patient to return home with no needs. Case management will continue to follow progress and update discharge plan as needed.       Risk of Readmission Score: N/A    HAI Ledesma RN    [facility]  Phone: [phone]

## 2022-08-03 NOTE — PLAN OF CARE
Problem: Skin/Tissue Integrity - Adult  Goal: Skin integrity remains intact  Outcome: Progressing     Problem: Skin/Tissue Integrity - Adult  Goal: Incisions, wounds, or drain sites healing without S/S of infection  Outcome: Progressing     Problem: Gastrointestinal - Adult  Goal: Minimal or absence of nausea and vomiting  Outcome: Progressing     Problem: Gastrointestinal - Adult  Goal: Maintains or returns to baseline bowel function  Outcome: Progressing     Problem: Gastrointestinal - Adult  Goal: Maintains adequate nutritional intake  Outcome: Progressing

## 2022-08-03 NOTE — ANESTHESIA PRE PROCEDURE
Department of Anesthesiology  Preprocedure Note       Name:  Anastacio Martinez   Age:  28 y.o.  :  1990                                          MRN:  [id number]         Date:  8/3/2022      Surgeon: Divya Stahl): Karan Mccollum MD    Procedure: Procedure(s):  LAPAROSCOPIC SLEEVE GASTRECTOMY AND POSSIBLE OTHER INDICATED PROCEDURES-ETHICON/MEDTRONIC    Medications prior to admission:   Prior to Admission medications    Medication Sig Start Date End Date Taking? Authorizing Provider   medroxyPROGESTERone Acetate (DEPO-PROVERA IM) Inject into the muscle every 3 months    Historical Provider, MD   metoprolol tartrate (LOPRESSOR) 25 MG tablet Take 25 mg by mouth 2 times daily 10/22/20   Historical Provider, MD       Current medications:    Current Facility-Administered Medications   Medication Dose Route Frequency Provider Last Rate Last Admin    lactated ringers infusion   IntraVENous Continuous Karan Mccollum MD 50 mL/hr at 22 0635 New Bag at 22 0635    lidocaine PF 1 % injection 0.5 mL  0.5 mL IntraDERmal Once Karan Mccollum MD        ceFAZolin (ANCEF) 2,000 mg in dextrose 5 % 50 mL IVPB (mini-bag)  2,000 mg IntraVENous On Call to Robert Chen MD        scopolamine (TRANSDERM-SCOP) transdermal patch 1 patch  1 patch TransDERmal Once Karan Mccollum MD   1 patch at 22 5730       Allergies:     Allergies   Allergen Reactions    Latex Rash    Strawberry Extract Hives       Problem List:    Patient Active Problem List   Diagnosis Code    History of gestational diabetes mellitus (GDM) in prior pregnancy, currently pregnant in second trimester O09.292, Z86.32    Obesity in pregnancy O99.210    Screening, , for malformation by ultrasound Z36.3    Morbid obesity with BMI of 45.0-49.9, adult ([facility] [address] E66.01, Z68.42    Essential hypertension I10    Chronic GERD K21.9    Vitamin D deficiency E55.9    Carpal tunnel syndrome, bilateral G56.03    Carpal tunnel syndrome G56.00 Past Medical History:        Diagnosis Date    Abnormal heart rhythm     irregular heart rate    Abnormal Pap smear of cervix 2015    LEEP    Bipolar 1 disorder ([address]     H/o Molar pregnancy     Hypertension     Hypoglycemia     Uterus bicornis affecting pregnancy     Wears glasses        Past Surgical History:        Procedure Laterality Date    APPENDECTOMY  2015    CARPAL TUNNEL RELEASE Left 2022    LEFT CARPAL TUNNEL RELEASE performed by Nereyda Barton MD at [address] Right 2022    RIGHT CARPAL TUNNEL RELEASE performed by Nereyda Barton MD at [facility]      DILATION AND CURETTAGE OF UTERUS  2011    LEEP      TONSILLECTOMY  2016    UPPER GASTROINTESTINAL ENDOSCOPY N/A 10/22/2021    EGD BIOPSY performed by David Anton MD at [address] [facility] [place], [facility] History:    Social History     Tobacco Use    Smoking status: Former     Packs/day: 0.50     Years: 15.00     Pack years: 7.50     Types: Cigarettes     Quit date: 10/24/2016     Years since quittin.7    Smokeless tobacco: Never   Substance Use Topics    Alcohol use: No                                Counseling given: Not Answered      Vital Signs (Current):   Vitals:    22 1513   Weight: 261 lb (118.4 kg)   Height: 5' 1\" (1.549 m)                                              BP Readings from Last 3 Encounters:   22 120/70   22 131/84   22 127/65       NPO Status: Time of last liquid consumption:                         Time of last solid consumption:                         Date of last liquid consumption: 22                        Date of last solid food consumption: 22    BMI:   Wt Readings from Last 3 Encounters:   22 261 lb (118.4 kg)   22 260 lb 9.6 oz (118.2 kg)   22 257 lb (116.6 kg)     Body mass index is 49.32 kg/m².     CBC:   Lab Results   Component Value Date/Time    WBC 6.0 2022 12:55 PM RBC 4.48 07/26/2022 12:55 PM    HGB 13.3 07/26/2022 12:55 PM    HCT 39.5 07/26/2022 12:55 PM    MCV 88.3 07/26/2022 12:55 PM    RDW 13.5 07/26/2022 12:55 PM     07/26/2022 12:55 PM       CMP:   Lab Results   Component Value Date/Time     07/26/2022 12:55 PM    K 4.0 07/26/2022 12:55 PM    K 3.6 08/04/2021 12:08 PM     07/26/2022 12:55 PM    CO2 20 07/26/2022 12:55 PM    BUN 9 07/26/2022 12:55 PM    CREATININE 0.5 07/26/2022 12:55 PM    GFRAA >60 07/26/2022 12:55 PM    AGRATIO 1.4 07/26/2022 12:55 PM    LABGLOM >60 07/26/2022 12:55 PM    GLUCOSE 99 07/26/2022 12:55 PM    PROT 7.7 07/26/2022 12:55 PM    CALCIUM 9.3 07/26/2022 12:55 PM    BILITOT 0.6 07/26/2022 12:55 PM    ALKPHOS 87 07/26/2022 12:55 PM    AST 95 07/26/2022 12:55 PM     07/26/2022 12:55 PM       POC Tests: No results for input(s): POCGLU, POCNA, POCK, POCCL, POCBUN, POCHEMO, POCHCT in the last 72 hours.     Coags:   Lab Results   Component Value Date/Time    PROTIME 11.5 03/16/2022 12:54 PM    INR 1.02 03/16/2022 12:54 PM    APTT 31.1 02/21/2018 11:24 AM       HCG (If Applicable):   Lab Results   Component Value Date    PREGTESTUR Negative 08/03/2022    HCG NEGATIVE 11/29/2016    HCGQUANT <1 11/16/2016        ABGs: No results found for: PHART, PO2ART, KXM8REI, ALK3GJJ, BEART, F2URGWCK     Type & Screen (If Applicable):  No results found for: LABABO, LABRH    Drug/Infectious Status (If Applicable):  No results found for: HIV, HEPCAB    COVID-19 Screening (If Applicable):   Lab Results   Component Value Date/Time    COVID19 Not Detected 08/04/2021 12:08 PM           Anesthesia Evaluation  Patient summary reviewed and Nursing notes reviewed no history of anesthetic complications:   Airway: Mallampati: II  TM distance: >3 FB   Neck ROM: full  Mouth opening: > = 3 FB   Dental: normal exam         Pulmonary: breath sounds clear to auscultation      (-) COPD and asthma                          ROS comment: Former smoker Cardiovascular:  Exercise tolerance: good (>4 METS),   (+) hypertension:,     (-) CABG/stent, dysrhythmias and  angina      Rhythm: regular  Rate: normal                    Neuro/Psych:      (-) seizures, TIA and CVA           GI/Hepatic/Renal:   (+) GERD:, morbid obesity         ROS comment: Denies gerd sx or n/v  today. Endo/Other:        (-) hypothyroidism, hyperthyroidism               Abdominal:   (+) obese,           Vascular:     - DVT and PE. Other Findings:           Anesthesia Plan      general     ASA 3       Induction: intravenous. MIPS: Postoperative opioids intended and Prophylactic antiemetics administered. Anesthetic plan and risks discussed with patient. Plan discussed with CRNA.                     Estefany Suarez MD   8/3/2022

## 2022-08-03 NOTE — H&P
[facility]) Physicians   [facility]  [address] [place] [address], [address] [place] [id number] . Phone: [phone]  Fax: [phone]              Patient's History and Physical from July 25, 2022 was reviewed. Surem seen and examined. There has been no change. HISTORY OF PRESENT ILLNESS:    Tamie Aguilar is a very pleasant 28 y.o. with Body mass index is 49.13 kg/m². who is presenting for planned Laparoscopic Sleeve Gastrectomy for future weight loss. Past Medical History:        Diagnosis Date    Abnormal heart rhythm     irregular heart rate    Abnormal Pap smear of cervix 2015    LEEP    Bipolar 1 disorder ([address]     H/o Molar pregnancy     Hypertension     Hypoglycemia     Uterus bicornis affecting pregnancy     Wears glasses      Past Surgical History:        Procedure Laterality Date    APPENDECTOMY  2015    CARPAL TUNNEL RELEASE Left 6/7/2022    LEFT CARPAL TUNNEL RELEASE performed by Delaney Freeman MD at [address] Right 6/24/2022    RIGHT CARPAL TUNNEL RELEASE performed by Delaney Freeman MD at [facility]  2016    [address] OF UTERUS  2011    LEEP      TONSILLECTOMY  11/29/2016    UPPER GASTROINTESTINAL ENDOSCOPY N/A 10/22/2021    EGD BIOPSY performed by Concepción Hill MD at [address] [place] [address]     Medications Prior to Admission:   Medications Prior to Admission: medroxyPROGESTERone Acetate (DEPO-PROVERA IM), Inject into the muscle every 3 months  metoprolol tartrate (LOPRESSOR) 25 MG tablet, Take 25 mg by mouth 2 times daily    Allergies:  Latex and Strawberry extract    Social History:   TOBACCO:   reports that she quit smoking about 5 years ago. Her smoking use included cigarettes. She has a 7.50 pack-year smoking history. She has never used smokeless tobacco.  ETOH:   reports no history of alcohol use.   Family History:       Problem Relation Age of Onset    Diabetes Mother     No Known Problems Father          REVIEW OF SYSTEMS:    Review of Systems - History obtained from the patient  General ROS: obesity  Psychological ROS: negative  Ophthalmic ROS: negative  Neurological ROS: negative  ENT ROS: negative  Allergy and Immunology ROS: negative  Hematological and Lymphatic ROS: negative  Endocrine ROS: negative  Breast ROS: negative  Respiratory ROS: negative  Cardiovascular ROS: negative  Gastrointestinal ROS:negative  Genito-Urinary ROS: negative  Musculoskeletal ROS: negative   Skin ROS: negative      PHYSICAL EXAM:      BP (!) 147/66   Pulse 63   Temp 98.1 °F (36.7 °C) (Temporal)   Resp 18   Ht 5' 1\" (1.549 m)   Wt 260 lb (117.9 kg)   SpO2 99%   BMI 49.13 kg/m²  I        Physical Exam   Vitals Reviewed   Constitutional: Patient is oriented to person, place, and time. Patient appears well-developed and well-nourished. Patient is active and cooperative. Non-toxic appearance. No distress. HENT:   Head: Normocephalic and atraumatic. Head is without abrasion and without laceration. Hair is normal.   Right Ear: External ear normal. No lacerations. No drainage, swelling . Left Ear: External ear normal. No lacerations. No drainage, swelling. Nose: Nose normal. No nose lacerations or nasal deformity. Eyes: Conjunctivae, EOM and lids are normal. Right eye exhibits no discharge. No foreign body present in the right eye. Left eye exhibits no discharge. No foreign body present in the left eye. No scleral icterus. Neck: Trachea normal and normal range of motion. No JVD present. Pulmonary/Chest: Effort normal. No accessory muscle usage or stridor. No apnea. No respiratory distress. Cardiovascular: Normal rate and no JVD. Abdominal: Normal appearance. Patient exhibits no distension. Musculoskeletal: Normal range of motion. Patient exhibits no edema. Neurological: Patient is alert and oriented to person, place, and time. Patient has normal strength. GCS eye subscore is 4. GCS verbal subscore is 5.  GCS motor subscore is 6. Skin: Skin is warm and dry. No abrasion and no rash noted. Patient is not diaphoretic. No cyanosis or erythema. Psychiatric: Patient has a normal mood and affect. Speech is normal and behavior is normal. Cognition and memory are normal.       DATA:  CBC:   Lab Results   Component Value Date/Time    WBC 6.0 07/26/2022 12:55 PM    RBC 4.48 07/26/2022 12:55 PM    HGB 13.3 07/26/2022 12:55 PM    HCT 39.5 07/26/2022 12:55 PM    MCV 88.3 07/26/2022 12:55 PM    MCH 29.8 07/26/2022 12:55 PM    MCHC 33.7 07/26/2022 12:55 PM    RDW 13.5 07/26/2022 12:55 PM     07/26/2022 12:55 PM    MPV 9.7 07/26/2022 12:55 PM     CMP:    Lab Results   Component Value Date/Time     08/03/2022 06:23 AM    K 3.7 08/03/2022 06:23 AM    K 3.6 08/04/2021 12:08 PM     08/03/2022 06:23 AM    CO2 26 08/03/2022 06:23 AM    BUN 12 08/03/2022 06:23 AM    CREATININE 0.6 08/03/2022 06:23 AM    GFRAA >60 08/03/2022 06:23 AM    AGRATIO 1.1 08/03/2022 06:23 AM    LABGLOM >60 08/03/2022 06:23 AM    GLUCOSE 96 08/03/2022 06:23 AM    PROT 7.7 08/03/2022 06:23 AM    LABALBU 4.1 08/03/2022 06:23 AM    CALCIUM 9.3 08/03/2022 06:23 AM    BILITOT 0.4 08/03/2022 06:23 AM    ALKPHOS 94 08/03/2022 06:23 AM    AST 43 08/03/2022 06:23 AM    ALT 87 08/03/2022 06:23 AM       ASSESSMENT AND PLAN:      Romi Logan is a 28 y.o. female with Body mass index is 49.13 kg/m². ,  patient is deemed appropriate candidate for weight loss surgery by our multidisciplinary team.       Following The Metabolic and Bariatric Surgery Accreditation and Quality Improvement Program [facility]), and [employer] of [employer]geons (ACS) recommendations, the Bariatric Surgical Risk/Benefit Calculator was used for Romi Doreen. Report was discussed with Surem and patient wishes to proceed with surgery, fully understanding the risks and benefits.     Of note, The [facility] Bariatric Surgical Risk/Benefit Calculator estimates the chance of an unfavorable outcome (such as a complication or death), the chance of remission of weight-related comorbidities, and the patient's BMI, weight change, and percent total weight change after surgery. These quantities are estimated based upon information the patient gives the healthcare provider about prior health history. The estimates are calculated using data from a large number of patients who had a primary bariatric surgical procedure similar to the one the patient may have. Please note the risk percentages, remission percentages, BMI, weight change, and percent total weight change provided to you by the risk/benefit calculator are only estimates. These estimates only take certain information into account. There may be other factors that are not included in the estimate which may increase or decrease the risk of a complication, the chance of remission of a weight-related comorbidity, or the amount of weight the patient loses. These estimates are not a guarantee of results. A complication after surgery may happen even if the risk is low, a weight-related comorbidity may not go into remission even if the chances are high, and a patient may lose more or less weight than predicted. This information is not intended to replace the advice of a doctor or healthcare provider about the diagnosis, treatment, or potential outcomes. The Provider is not responsible for medical decisions that may be made by the patient based on the risk/benefit calculator estimates, since these estimates are provided for informational purposes. Patients should always consult their doctor or other health care provider before deciding on a treatment plan. Both open and laparoscopic approach were explained in details. Benefits and risks explained. Informed consent obtained. Risks including but not limited to; Infection, bleeding, gastric leak or obstruction, persistent nausea, vomiting, or reflux, injury to surrounding structures, risks of anesthesia, stricture, delayed gastric emptying, staple line leak, incisional hernia, malnutrition , hair loss, and/ or Conversion to Open surgery may be necessary. Failure to lose or maintain weight loss, Gallstones or Kidney Stones, Deep Venous Thrombosis , pulmonary embolism and / or death. With obesity and/or Fatty liver , I may elect to perform liver core biopsy to have  Baseline idea on liver pathology if there is abnormal Liver Functions or if there is hepatomegaly &/or lesion, risks include but not limited to bile leak, liver hematoma or failure to diagnose a condition. [place] understands that there may be a need to perform other urgent or necessary procedures that were unanticipated. Radhasadia V Maggi consent to the performance of any additional procedures determined during the original procedure to be in their best interests and where delay might cause additional harm or put patient at risk for additional anesthesia risk for required by a second procedure and that will be determined by the surgeon. I did explain thoroughly to the patient that compliance with pre- and post op diet and other recommendations are integral part to improve the chances of successful weight loss and also not following it could end with serious health complications. We discussed that our goal is to ameliorate the medical problems and not to obtain a specific body mass index. Patient understands the risks and benefits and wishes to proceed with the procedure. Also understands if BMI is lower than 40 without significant co morbid conditions, concerns for risks of surgery being somewhat higher over long run, however patient wants to proceed and fully understands the risks. Clearly BMI over 35 does impose very serious health risks as well chances of losing weight on diet only is very limited and sustaining weight loss is even less, thus surgery is certainly recommended for long term weight loss and better health overall given compliance.      I advised the patient that we can't guarantee final insurance approval.      The patient was counseled at length about the risks of jimmy Covid-19 during their perioperative period and any recovery window from their procedure. The patient was made aware that jimmy Covid-19  may worsen their prognosis for recovering from their procedure  and lend to a higher morbidity and/or mortality risk. All material risks, benefits, and reasonable alternatives including postponing the procedure were discussed. The patient does wish to proceed with the procedure at this time. 1.  Plan for Laparoscopic Sleeve Gastrectomy with General Anesthesia. Chayo Leonard MD, FACS.

## 2022-08-03 NOTE — ANESTHESIA POSTPROCEDURE EVALUATION
Department of Anesthesiology  Postprocedure Note    Patient: Ava Lane  MRN: [id number]  YOB: 1990  Date of evaluation: 8/3/2022      Procedure Summary     Date: 08/03/22 Room / Location: [address]    Anesthesia Start: 0730 Anesthesia Stop: 0900    Procedure: LAPAROSCOPIC SLEEVE GASTRECTOMY (Abdomen) Diagnosis:       Morbid obesity ([address]      (Morbid obesity ([facility] [address] [E66.01])    Surgeons: Deforest Riedel, MD Responsible Provider:     Anesthesia Type: general ASA Status: 3          Anesthesia Type: No value filed.     Maikol Phase I: Maikol Score: 9    Maikol Phase II:        Anesthesia Post Evaluation    Patient location during evaluation: PACU  Patient participation: complete - patient participated  Level of consciousness: awake  Airway patency: patent  Nausea & Vomiting: no vomiting  Complications: no  Cardiovascular status: hemodynamically stable  Respiratory status: acceptable  Hydration status: euvolemic  Multimodal analgesia pain management approach

## 2022-08-04 ENCOUNTER — APPOINTMENT (OUTPATIENT)
Dept: GENERAL RADIOLOGY | Age: 32
End: 2022-08-04
Attending: SURGERY
Payer: MEDICAID

## 2022-08-04 VITALS
HEIGHT: 61 IN | WEIGHT: 260 LBS | TEMPERATURE: 98.4 F | SYSTOLIC BLOOD PRESSURE: 133 MMHG | OXYGEN SATURATION: 96 % | DIASTOLIC BLOOD PRESSURE: 74 MMHG | RESPIRATION RATE: 18 BRPM | HEART RATE: 50 BPM | BODY MASS INDEX: 49.09 KG/M2

## 2022-08-04 PROBLEM — Z98.84 S/P LAPAROSCOPIC SLEEVE GASTRECTOMY: Status: ACTIVE | Noted: 2022-08-04

## 2022-08-04 LAB
ANION GAP SERPL CALCULATED.3IONS-SCNC: 8 MMOL/L (ref 3–16)
BUN BLDV-MCNC: 5 MG/DL (ref 7–20)
CALCIUM SERPL-MCNC: 9.1 MG/DL (ref 8.3–10.6)
CHLORIDE BLD-SCNC: 103 MMOL/L (ref 99–110)
CO2: 25 MMOL/L (ref 21–32)
CREAT SERPL-MCNC: <0.5 MG/DL (ref 0.6–1.1)
GFR AFRICAN AMERICAN: >60
GFR NON-AFRICAN AMERICAN: >60
GLUCOSE BLD-MCNC: 124 MG/DL (ref 70–99)
HCT VFR BLD CALC: 36.5 % (ref 36–48)
HEMOGLOBIN: 12.1 G/DL (ref 12–16)
MCH RBC QN AUTO: 29.2 PG (ref 26–34)
MCHC RBC AUTO-ENTMCNC: 33.1 G/DL (ref 31–36)
MCV RBC AUTO: 88.1 FL (ref 80–100)
PDW BLD-RTO: 13.9 % (ref 12.4–15.4)
PLATELET # BLD: 253 K/UL (ref 135–450)
PMV BLD AUTO: 9.2 FL (ref 5–10.5)
POTASSIUM SERPL-SCNC: 4.6 MMOL/L (ref 3.5–5.1)
RBC # BLD: 4.15 M/UL (ref 4–5.2)
SODIUM BLD-SCNC: 136 MMOL/L (ref 136–145)
WBC # BLD: 12.9 K/UL (ref 4–11)

## 2022-08-04 PROCEDURE — C9113 INJ PANTOPRAZOLE SODIUM, VIA: HCPCS | Performed by: SURGERY

## 2022-08-04 PROCEDURE — 6360000002 HC RX W HCPCS: Performed by: NURSE PRACTITIONER

## 2022-08-04 PROCEDURE — 99024 POSTOP FOLLOW-UP VISIT: CPT | Performed by: NURSE PRACTITIONER

## 2022-08-04 PROCEDURE — 74240 X-RAY XM UPR GI TRC 1CNTRST: CPT

## 2022-08-04 PROCEDURE — 6360000004 HC RX CONTRAST MEDICATION: Performed by: SURGERY

## 2022-08-04 PROCEDURE — 96376 TX/PRO/DX INJ SAME DRUG ADON: CPT

## 2022-08-04 PROCEDURE — 80048 BASIC METABOLIC PNL TOTAL CA: CPT

## 2022-08-04 PROCEDURE — G0378 HOSPITAL OBSERVATION PER HR: HCPCS

## 2022-08-04 PROCEDURE — 6360000002 HC RX W HCPCS: Performed by: SURGERY

## 2022-08-04 PROCEDURE — 6370000000 HC RX 637 (ALT 250 FOR IP): Performed by: NURSE PRACTITIONER

## 2022-08-04 PROCEDURE — 36415 COLL VENOUS BLD VENIPUNCTURE: CPT

## 2022-08-04 PROCEDURE — 96372 THER/PROPH/DIAG INJ SC/IM: CPT

## 2022-08-04 PROCEDURE — APPSS180 APP SPLIT SHARED TIME > 60 MINUTES: Performed by: NURSE PRACTITIONER

## 2022-08-04 PROCEDURE — 85027 COMPLETE CBC AUTOMATED: CPT

## 2022-08-04 PROCEDURE — 6370000000 HC RX 637 (ALT 250 FOR IP): Performed by: SURGERY

## 2022-08-04 PROCEDURE — 96374 THER/PROPH/DIAG INJ IV PUSH: CPT

## 2022-08-04 PROCEDURE — 96375 TX/PRO/DX INJ NEW DRUG ADDON: CPT

## 2022-08-04 PROCEDURE — 2580000003 HC RX 258: Performed by: SURGERY

## 2022-08-04 RX ORDER — OXYCODONE HYDROCHLORIDE AND ACETAMINOPHEN 5; 325 MG/1; MG/1
1 TABLET ORAL EVERY 4 HOURS PRN
Status: DISCONTINUED | OUTPATIENT
Start: 2022-08-04 | End: 2022-08-04 | Stop reason: HOSPADM

## 2022-08-04 RX ORDER — ONDANSETRON 4 MG/1
8 TABLET, ORALLY DISINTEGRATING ORAL EVERY 8 HOURS PRN
Qty: 30 TABLET | Refills: 2 | Status: SHIPPED | OUTPATIENT
Start: 2022-08-04

## 2022-08-04 RX ORDER — FAMOTIDINE 20 MG/1
20 TABLET, FILM COATED ORAL DAILY
Qty: 30 TABLET | Refills: 0 | Status: SHIPPED | OUTPATIENT
Start: 2022-08-04 | End: 2022-08-04 | Stop reason: SDUPTHER

## 2022-08-04 RX ORDER — KETOROLAC TROMETHAMINE 30 MG/ML
15 INJECTION, SOLUTION INTRAMUSCULAR; INTRAVENOUS EVERY 6 HOURS
Status: COMPLETED | OUTPATIENT
Start: 2022-08-04 | End: 2022-08-04

## 2022-08-04 RX ORDER — ONDANSETRON 4 MG/1
8 TABLET, ORALLY DISINTEGRATING ORAL EVERY 8 HOURS PRN
Qty: 30 TABLET | Refills: 0 | Status: SHIPPED | OUTPATIENT
Start: 2022-08-04 | End: 2022-09-01

## 2022-08-04 RX ORDER — FAMOTIDINE 20 MG/1
20 TABLET, FILM COATED ORAL DAILY PRN
Qty: 30 TABLET | Refills: 0 | Status: SHIPPED | OUTPATIENT
Start: 2022-08-04

## 2022-08-04 RX ORDER — OXYCODONE HYDROCHLORIDE AND ACETAMINOPHEN 5; 325 MG/1; MG/1
2 TABLET ORAL EVERY 4 HOURS PRN
Status: DISCONTINUED | OUTPATIENT
Start: 2022-08-04 | End: 2022-08-04 | Stop reason: HOSPADM

## 2022-08-04 RX ORDER — PROMETHAZINE HYDROCHLORIDE 6.25 MG/5ML
12.5 SYRUP ORAL EVERY 6 HOURS PRN
Status: DISCONTINUED | OUTPATIENT
Start: 2022-08-04 | End: 2022-08-04 | Stop reason: HOSPADM

## 2022-08-04 RX ORDER — OXYCODONE HYDROCHLORIDE AND ACETAMINOPHEN 5; 325 MG/1; MG/1
1 TABLET ORAL EVERY 6 HOURS PRN
Qty: 28 TABLET | Refills: 0 | Status: SHIPPED | OUTPATIENT
Start: 2022-08-04 | End: 2022-08-11

## 2022-08-04 RX ORDER — ONDANSETRON 8 MG/1
8 TABLET, ORALLY DISINTEGRATING ORAL EVERY 8 HOURS PRN
Status: DISCONTINUED | OUTPATIENT
Start: 2022-08-04 | End: 2022-08-04 | Stop reason: HOSPADM

## 2022-08-04 RX ADMIN — IOHEXOL 50 ML: 350 INJECTION, SOLUTION INTRAVENOUS at 08:05

## 2022-08-04 RX ADMIN — SODIUM CHLORIDE, PRESERVATIVE FREE 40 MG: 5 INJECTION INTRAVENOUS at 09:33

## 2022-08-04 RX ADMIN — ENOXAPARIN SODIUM 30 MG: 100 INJECTION SUBCUTANEOUS at 09:33

## 2022-08-04 RX ADMIN — KETOROLAC TROMETHAMINE 15 MG: 30 INJECTION, SOLUTION INTRAMUSCULAR at 15:34

## 2022-08-04 RX ADMIN — SODIUM CHLORIDE, POTASSIUM CHLORIDE, SODIUM LACTATE AND CALCIUM CHLORIDE: 600; 310; 30; 20 INJECTION, SOLUTION INTRAVENOUS at 03:22

## 2022-08-04 RX ADMIN — OXYCODONE AND ACETAMINOPHEN 2 TABLET: 5; 325 TABLET ORAL at 14:56

## 2022-08-04 RX ADMIN — KETOROLAC TROMETHAMINE 15 MG: 30 INJECTION, SOLUTION INTRAMUSCULAR at 09:42

## 2022-08-04 RX ADMIN — SODIUM CHLORIDE, POTASSIUM CHLORIDE, SODIUM LACTATE AND CALCIUM CHLORIDE: 600; 310; 30; 20 INJECTION, SOLUTION INTRAVENOUS at 09:45

## 2022-08-04 ASSESSMENT — PAIN SCALES - GENERAL
PAINLEVEL_OUTOF10: 9
PAINLEVEL_OUTOF10: 4
PAINLEVEL_OUTOF10: 9
PAINLEVEL_OUTOF10: 6

## 2022-08-04 ASSESSMENT — PAIN DESCRIPTION - DESCRIPTORS
DESCRIPTORS: ACHING

## 2022-08-04 ASSESSMENT — PAIN DESCRIPTION - ORIENTATION
ORIENTATION: MID

## 2022-08-04 ASSESSMENT — PAIN DESCRIPTION - LOCATION
LOCATION: ABDOMEN

## 2022-08-04 ASSESSMENT — PAIN DESCRIPTION - PAIN TYPE: TYPE: CHRONIC PAIN

## 2022-08-04 ASSESSMENT — PAIN DESCRIPTION - FREQUENCY: FREQUENCY: CONTINUOUS

## 2022-08-04 ASSESSMENT — PAIN - FUNCTIONAL ASSESSMENT: PAIN_FUNCTIONAL_ASSESSMENT: ACTIVITIES ARE NOT PREVENTED

## 2022-08-04 NOTE — PROGRESS NOTES
Shift assessment completed, pt is alert and oriented, VSS, ambulates in hallway and back in bed, call light within reach, pt understands how to use PCA pump, pt is reminded of NPO status, see flowsheets and MAR, will monitor pt. The care plan and education has been reviewed and mutually agreed upon with the patient.

## 2022-08-04 NOTE — PROGRESS NOTES
[facility]) Physicians   General & Laparoscopic Surgery  [facility]       Pt seen and examined    S/p laparoscopic sleeve gastrectomy     The patient is ambulating in nuñez. Pain is well controlled. There is no nausea and/or vomiting. There are no other complaints or questions. Vitals:    08/04/22 0315 08/04/22 0500 08/04/22 0621 08/04/22 0730   BP: (!) 150/91 (!) 155/72 (!) 150/75 (!) 149/90   Pulse: (!) 49 58 54 52   Resp: 20 19 20 18   Temp:   98.7 °F (37.1 °C) 98.3 °F (36.8 °C)   TempSrc:   Oral Oral   SpO2: 97% 98% 98% 97%   Weight:       Height:         Abdomen is soft, appropriately tender, incisions stable with no erythema. Breath sounds are clear bilaterally. Bowel sounds are hypoactive in all quadrants.     Data  CBC:   Lab Results   Component Value Date/Time    WBC 12.9 08/04/2022 04:42 AM    RBC 4.15 08/04/2022 04:42 AM    HGB 12.1 08/04/2022 04:42 AM    HCT 36.5 08/04/2022 04:42 AM    MCV 88.1 08/04/2022 04:42 AM    MCH 29.2 08/04/2022 04:42 AM    MCHC 33.1 08/04/2022 04:42 AM    RDW 13.9 08/04/2022 04:42 AM     08/04/2022 04:42 AM    MPV 9.2 08/04/2022 04:42 AM     BMP:    Lab Results   Component Value Date/Time     08/04/2022 04:42 AM    K 4.6 08/04/2022 04:42 AM    K 3.6 08/04/2021 12:08 PM     08/04/2022 04:42 AM    CO2 25 08/04/2022 04:42 AM    BUN 5 08/04/2022 04:42 AM    LABALBU 4.1 08/03/2022 06:23 AM    CREATININE <0.5 08/04/2022 04:42 AM    CALCIUM 9.1 08/04/2022 04:42 AM    GFRAA >60 08/04/2022 04:42 AM    LABGLOM >60 08/04/2022 04:42 AM    GLUCOSE 124 08/04/2022 04:42 AM     Current Inpatient Medications    Current Facility-Administered Medications: 0.9 % sodium chloride infusion, , IntraVENous, Continuous  diphenhydrAMINE (BENADRYL) injection 12.5 mg, 12.5 mg, IntraVENous, Q6H PRN  hydrALAZINE (APRESOLINE) injection 10 mg, 10 mg, IntraVENous, Q2H PRN  hyoscyamine (LEVSIN) 500 MCG/ML injection 250 mcg, 250 mcg, IntraVENous, Q4H PRN  labetalol (NORMODYNE;TRANDATE) injection 10 mg, 10 mg, IntraVENous, Q2H PRN  lidocaine 4 % external patch 1 patch, 1 patch, TransDERmal, Daily  pantoprazole (PROTONIX) 40 mg in sodium chloride (PF) 10 mL injection, 40 mg, IntraVENous, Daily  promethazine (PHENERGAN) injection 6.25 mg, 6.25 mg, IntraMUSCular, Q6H PRN  metoprolol (LOPRESSOR) injection 2.5 mg, 2.5 mg, IntraVENous, Q6H  naloxone 0.4 mg in 10 mL sodium chloride syringe, , IntraVENous, PRN  lactated ringers infusion, , IntraVENous, Continuous  sodium chloride flush 0.9 % injection 5-40 mL, 5-40 mL, IntraVENous, 2 times per day  sodium chloride flush 0.9 % injection 5-40 mL, 5-40 mL, IntraVENous, PRN  0.9 % sodium chloride infusion, , IntraVENous, PRN  HYDROmorphone HCl PF (DILAUDID) injection 0.5 mg, 0.5 mg, IntraVENous, Q3H PRN  ondansetron (ZOFRAN) injection 4 mg, 4 mg, IntraVENous, Q6H PRN  scopolamine (TRANSDERM-SCOP) transdermal patch 1 patch, 1 patch, TransDERmal, Q72H  metoclopramide (REGLAN) injection 10 mg, 10 mg, IntraVENous, Q6H PRN  enoxaparin Sodium (LOVENOX) injection 30 mg, 30 mg, SubCUTAneous, 2 times per day  HYDROmorphone (DILAUDID) 0.2 mg/mL PCA, , IntraVENous, Continuous    Patient Active Problem List   Diagnosis    History of gestational diabetes mellitus (GDM) in prior pregnancy, currently pregnant in second trimester    Obesity in pregnancy    Screening, , for malformation by ultrasound    Morbid obesity with BMI of 45.0-49.9, adult ([facility] [address]    Essential hypertension    Chronic GERD    Vitamin D deficiency    Carpal tunnel syndrome, bilateral    Carpal tunnel syndrome    S/P laparoscopic sleeve gastrectomy     A/P  [place] HEREDIA Yeimy Infante is a 28 y.o. female pod#1, s/p laparoscopic sleeve gastrectomy. Stable overall. UGI with no leak/obstruction, will start on Phase I diet. Patient has voided postoperatively and urine output is WNL. Will continue to monitor. Will discontinue PCA and start oral pain medications.    Will order two doses of IV Toradol. Will discontinue IV metoprolol and restart patient's home dose of Lopressor. Patient will continue to wear abdominal binder when walking for support. Patient needs to ambulate in the nuñez every 60-90 minutes. Patient needs to utilize incentive spirometer 10 times per hour while awake. Patient will continue icing incisions. Plan for discharge today if nausea remains controlled, patient continues to void and is tolerating Phase I diet. Discussed with Dr. Savannah Rod and Nursing Staff.     Saud Pena NP-C

## 2022-08-04 NOTE — PROGRESS NOTES
CLINICAL PHARMACY NOTE: MEDS TO BEDS    Total # of Prescriptions Filled: 3   The following medications were delivered to the patient:  Famotidine  Oxycodone apap  Ondansetron      Additional Documentation:  Pt signed

## 2022-08-04 NOTE — PLAN OF CARE
Problem: Discharge Planning  Goal: Discharge to home or other facility with appropriate resources  8/4/2022 1542 by Alicja Friend RN  Outcome: Completed  8/4/2022 1136 by Alicja Friend RN  Outcome: Progressing     Problem: Pain  Goal: Verbalizes/displays adequate comfort level or baseline comfort level  8/4/2022 1542 by Alicja Friend RN  Outcome: Completed  8/4/2022 1136 by Alicja Friend RN  Outcome: Progressing     Problem: Skin/Tissue Integrity - Adult  Goal: Skin integrity remains intact  8/4/2022 1542 by Alicja Friend RN  Outcome: Completed  8/4/2022 1136 by Alicja Friend RN  Outcome: Progressing  Goal: Incisions, wounds, or drain sites healing without S/S of infection  8/4/2022 1542 by Alicja Friend RN  Outcome: Completed  8/4/2022 1136 by Alicja Friend RN  Outcome: Progressing     Problem: Gastrointestinal - Adult  Goal: Minimal or absence of nausea and vomiting  8/4/2022 1542 by Alicja Friend RN  Outcome: Completed  8/4/2022 1136 by Alicja Friend RN  Outcome: Progressing  Goal: Maintains or returns to baseline bowel function  8/4/2022 1542 by Alicja Friend RN  Outcome: Completed  8/4/2022 1136 by Alicja Friend RN  Outcome: Progressing  Goal: Maintains adequate nutritional intake  8/4/2022 1542 by Alicja Friend RN  Outcome: Completed  8/4/2022 1136 by Alicja Friend RN  Outcome: Progressing     Problem: Safety - Adult  Goal: Free from fall injury  Outcome: Completed     Problem: ABCDS Injury Assessment  Goal: Absence of physical injury  Outcome: Completed

## 2022-08-04 NOTE — PLAN OF CARE
Problem: Discharge Planning  Goal: Discharge to home or other facility with appropriate resources  8/4/2022 1136 by Tj Marrero RN  Outcome: Progressing  8/3/2022 2315 by Kenroy Harley RN  Outcome: Progressing  Flowsheets (Taken 8/3/2022 1410 by Gustavo Woody RN)  Discharge to home or other facility with appropriate resources: Identify barriers to discharge with patient and caregiver     Problem: Pain  Goal: Verbalizes/displays adequate comfort level or baseline comfort level  8/4/2022 1136 by Tj Marrero RN  Outcome: Progressing  8/3/2022 2315 by Kenroy Harley RN  Outcome: Progressing     Problem: Skin/Tissue Integrity - Adult  Goal: Skin integrity remains intact  8/4/2022 1136 by Tj Marrero RN  Outcome: Progressing  8/3/2022 2315 by Kenroy Harley RN  Outcome: Progressing  Goal: Incisions, wounds, or drain sites healing without S/S of infection  8/4/2022 1136 by Tj Marrero RN  Outcome: Progressing  8/3/2022 2315 by Kenroy Harley RN  Outcome: Progressing     Problem: Gastrointestinal - Adult  Goal: Minimal or absence of nausea and vomiting  8/4/2022 1136 by Tj Marrero RN  Outcome: Progressing  8/3/2022 2315 by Kenroy Harley RN  Outcome: Progressing  Goal: Maintains or returns to baseline bowel function  8/4/2022 1136 by Tj Marrero RN  Outcome: Progressing  8/3/2022 2315 by Kenroy Harley RN  Outcome: Progressing  Goal: Maintains adequate nutritional intake  8/4/2022 1136 by Tj Marrero RN  Outcome: Progressing  8/3/2022 2315 by Kenroy Harley RN  Outcome: Progressing

## 2022-08-04 NOTE — PROGRESS NOTES
Shift assessment completed, pt A&O x4, able to make all her needs known, HR 52 noted, BP med held as ordered, lap sites to abd CDI, well approximated, c/o pain 4/10, denies any flatus at this time. Ambulates independently after set up, POC and education reviewed with patient and mutually agreed upon. Transport here to take pt off unit for procedure. 1606 pm:   Discharge instructions reviewed with pt, and all questions answered. 1615: Pt discharged with all personal belongings, ambulated to exit, tolerated well.

## 2022-08-04 NOTE — PLAN OF CARE
Problem: Pain  Goal: Verbalizes/displays adequate comfort level or baseline comfort level  Outcome: Progressing     Problem: Pain  Goal: Verbalizes/displays adequate comfort level or baseline comfort level  Outcome: Progressing     Problem: Skin/Tissue Integrity - Adult  Goal: Skin integrity remains intact  8/3/2022 2315 by Betsy Pablo RN  Outcome: Progressing     Problem: Skin/Tissue Integrity - Adult  Goal: Incisions, wounds, or drain sites healing without S/S of infection  8/3/2022 2315 by Betsy Pablo RN  Outcome: Progressing     Problem: Gastrointestinal - Adult  Goal: Minimal or absence of nausea and vomiting  8/3/2022 2315 by Betsy Pablo RN  Outcome: Progressing     Problem: Gastrointestinal - Adult  Goal: Maintains or returns to baseline bowel function  8/3/2022 2315 by Betsy Pablo RN  Outcome: Progressing

## 2022-08-04 NOTE — DISCHARGE SUMMARY
The patient was admitted on day of surgery and underwent a laparoscopic sleeve gastrectomy. Surgery went well and the patient went to our post-op bariatric floor. Overnight, the patient had stable vitals signs, good urine output and ambulated in the halls. On POD #1 the patient underwent an UGI which revealed no leak or obstruction. Phase I diet was started and the patient tolerated well. The patient has no N/V, tolerating diet, ambulating and pain controlled on oral medication. The patient was discharged home today in stable condition. The patient will f/u in 2 weeks and was given dietary and ambulation instruction. The patient was instructed to call if there are any questions or concerns.      Patient Active Problem List   Diagnosis    History of gestational diabetes mellitus (GDM) in prior pregnancy, currently pregnant in second trimester    Obesity in pregnancy    Screening, , for malformation by ultrasound    Morbid obesity with BMI of 45.0-49.9, adult ([facility] [address]    Essential hypertension    Chronic GERD    Vitamin D deficiency    Carpal tunnel syndrome, bilateral    Carpal tunnel syndrome    S/P laparoscopic sleeve gastrectomy

## 2022-08-08 ENCOUNTER — TELEPHONE (OUTPATIENT)
Dept: BARIATRICS/WEIGHT MGMT | Age: 32
End: 2022-08-08

## 2022-08-08 NOTE — TELEPHONE ENCOUNTER
Surgery Type: Sleeve    Surgery Date: 8/3/22    Surgeon: Dr. Reagan Guillen    The patient was contacted to follow up on their recent bariatric surgery. The following topics were reviewed:    [x] Hydration is Adequate - Drinking at least 48 oz per day of water, SF jello 1-2 x day           --Patient is getting at least 48-64 oz of fluids a day, not including protein shakes. []Consuming Adequate Protein         [x]Consuming 2 protein shakes a day - She is drinking 2 Nectar/Unjury shakes per day but they are making her feel nauseated (more so when drinking the shake with 1% milk, seems to do somewhat better if just making with water). []Consuming 60-80 grams of protein a day    [x] Food intake is appropriate  [x]Adequately pureeing foods, so that there are no chunks left. SF pudding, strained vegetable soup (just broth)   [x]Taking in 1-2 oz at a time  [x] Eating 4-6 times a day 2 shakes + pureed food 2 x day  [x] Following the 30-30-30 rule  [x] Reminded patient to keep food diary to bring to their 2 week follow up appointment. [x] Pain relief techniques utilized  [x] Taking pain medication as prescribed  [] Utilizing Lidoderm patches (if prescribed)  []Taking Tylenol instead of prescription pain medication  [x] Wearing abdominal binder  [x] Using ice for incisional pain    [x] Activity is appropriate  [x] Walking 10 minutes out of every hour  [x]Avoiding heavy lifting (>10lbs)  [x] Utilizing their incentive spirometer    [x] Issues with Nausea/Vomiting/Reflux  [x] Using Zofran PRN for nausea/vomiting   [x]Taking Prilosec for reflux    [] Issues with Constipation - had BM   []Tried Colace  []Tried Miralax    Pt meeting fluid goals. Struggling somewhat with tolerance of shakes but still getting them in. Discussed trying to stick with water, and to use protein peralta ([id number], gatorade zero with protein), etc. if needed.  Encouraged pt to focus on higher protein pureed foods as she is short of reaching her protein goal - chicken, tuna, etc. Advised her to review preop materials for more options, she still has folder and plans to review. Pt already started taking her vitamins, reminded her to take 4 a day with food on stomach as this can help with nausea. Pt verbalized understanding of all suggestions discussed. Patient was asked to please fill out hospital survey if she receives one in the mail.

## 2022-08-08 NOTE — TELEPHONE ENCOUNTER
Looks like overall patient is doing well. Agree with recommendations. Next follow up at 2 week post op appointment.   Thank you,  DIANE OrtaC

## 2022-08-13 ENCOUNTER — HOSPITAL ENCOUNTER (EMERGENCY)
Age: 32
Discharge: HOME | End: 2022-08-13
Attending: EMERGENCY MEDICINE
Payer: MEDICAID

## 2022-08-13 ENCOUNTER — APPOINTMENT (OUTPATIENT)
Dept: CT IMAGING | Age: 32
End: 2022-08-13
Payer: MEDICAID

## 2022-08-13 VITALS
HEART RATE: 83 BPM | TEMPERATURE: 98.3 F | DIASTOLIC BLOOD PRESSURE: 86 MMHG | SYSTOLIC BLOOD PRESSURE: 114 MMHG | OXYGEN SATURATION: 98 % | HEIGHT: 61 IN | BODY MASS INDEX: 48.33 KG/M2 | WEIGHT: 256 LBS | RESPIRATION RATE: 27 BRPM

## 2022-08-13 DIAGNOSIS — R10.12 LEFT UPPER QUADRANT ABDOMINAL PAIN: Primary | ICD-10-CM

## 2022-08-13 LAB
A/G RATIO: 1.2 (ref 1.1–2.2)
ALBUMIN SERPL-MCNC: 4.3 G/DL (ref 3.4–5)
ALP BLD-CCNC: 98 U/L (ref 40–129)
ALT SERPL-CCNC: 72 U/L (ref 10–40)
ANION GAP SERPL CALCULATED.3IONS-SCNC: 11 MMOL/L (ref 3–16)
AST SERPL-CCNC: 48 U/L (ref 15–37)
BACTERIA: ABNORMAL /HPF
BASOPHILS ABSOLUTE: 0 K/UL (ref 0–0.2)
BASOPHILS RELATIVE PERCENT: 0.6 %
BILIRUB SERPL-MCNC: 0.5 MG/DL (ref 0–1)
BILIRUBIN URINE: ABNORMAL
BLOOD, URINE: ABNORMAL
BUN BLDV-MCNC: 10 MG/DL (ref 7–20)
CALCIUM SERPL-MCNC: 9.6 MG/DL (ref 8.3–10.6)
CHLORIDE BLD-SCNC: 104 MMOL/L (ref 99–110)
CLARITY: ABNORMAL
CO2: 24 MMOL/L (ref 21–32)
COLOR: ABNORMAL
CREAT SERPL-MCNC: 0.6 MG/DL (ref 0.6–1.1)
EKG ATRIAL RATE: 54 BPM
EKG DIAGNOSIS: NORMAL
EKG P AXIS: 21 DEGREES
EKG P-R INTERVAL: 160 MS
EKG Q-T INTERVAL: 424 MS
EKG QRS DURATION: 86 MS
EKG QTC CALCULATION (BAZETT): 402 MS
EKG R AXIS: -2 DEGREES
EKG T AXIS: -2 DEGREES
EKG VENTRICULAR RATE: 54 BPM
EOSINOPHILS ABSOLUTE: 0.3 K/UL (ref 0–0.6)
EOSINOPHILS RELATIVE PERCENT: 2.9 %
EPITHELIAL CELLS, UA: ABNORMAL /HPF (ref 0–5)
GFR AFRICAN AMERICAN: >60
GFR NON-AFRICAN AMERICAN: >60
GLUCOSE BLD-MCNC: 87 MG/DL (ref 70–99)
GLUCOSE URINE: NEGATIVE MG/DL
HCG QUALITATIVE: NEGATIVE
HCT VFR BLD CALC: 41 % (ref 36–48)
HEMOGLOBIN: 13.8 G/DL (ref 12–16)
KETONES, URINE: ABNORMAL MG/DL
LACTIC ACID: 0.9 MMOL/L (ref 0.4–2)
LEUKOCYTE ESTERASE, URINE: ABNORMAL
LIPASE: 41 U/L (ref 13–60)
LYMPHOCYTES ABSOLUTE: 2.7 K/UL (ref 1–5.1)
LYMPHOCYTES RELATIVE PERCENT: 31 %
MCH RBC QN AUTO: 29.6 PG (ref 26–34)
MCHC RBC AUTO-ENTMCNC: 33.5 G/DL (ref 31–36)
MCV RBC AUTO: 88.4 FL (ref 80–100)
MICROSCOPIC EXAMINATION: YES
MONOCYTES ABSOLUTE: 1 K/UL (ref 0–1.3)
MONOCYTES RELATIVE PERCENT: 11.4 %
NEUTROPHILS ABSOLUTE: 4.7 K/UL (ref 1.7–7.7)
NEUTROPHILS RELATIVE PERCENT: 54.1 %
NITRITE, URINE: POSITIVE
PDW BLD-RTO: 13.7 % (ref 12.4–15.4)
PH UA: 5.5 (ref 5–8)
PLATELET # BLD: 269 K/UL (ref 135–450)
PMV BLD AUTO: 10.1 FL (ref 5–10.5)
POTASSIUM SERPL-SCNC: 3.7 MMOL/L (ref 3.5–5.1)
PROTEIN UA: 300 MG/DL
RBC # BLD: 4.64 M/UL (ref 4–5.2)
RBC UA: ABNORMAL /HPF (ref 0–4)
SODIUM BLD-SCNC: 139 MMOL/L (ref 136–145)
SPECIFIC GRAVITY UA: 1.02 (ref 1–1.03)
TOTAL PROTEIN: 8 G/DL (ref 6.4–8.2)
URINE TYPE: ABNORMAL
UROBILINOGEN, URINE: 0.2 E.U./DL
WBC # BLD: 8.7 K/UL (ref 4–11)
WBC UA: ABNORMAL /HPF (ref 0–5)

## 2022-08-13 PROCEDURE — 93005 ELECTROCARDIOGRAM TRACING: CPT | Performed by: EMERGENCY MEDICINE

## 2022-08-13 PROCEDURE — 2580000003 HC RX 258: Performed by: EMERGENCY MEDICINE

## 2022-08-13 PROCEDURE — 81001 URINALYSIS AUTO W/SCOPE: CPT

## 2022-08-13 PROCEDURE — 74176 CT ABD & PELVIS W/O CONTRAST: CPT

## 2022-08-13 PROCEDURE — 83690 ASSAY OF LIPASE: CPT

## 2022-08-13 PROCEDURE — 99284 EMERGENCY DEPT VISIT MOD MDM: CPT

## 2022-08-13 PROCEDURE — 96375 TX/PRO/DX INJ NEW DRUG ADDON: CPT

## 2022-08-13 PROCEDURE — 96374 THER/PROPH/DIAG INJ IV PUSH: CPT

## 2022-08-13 PROCEDURE — 6360000002 HC RX W HCPCS: Performed by: EMERGENCY MEDICINE

## 2022-08-13 PROCEDURE — 85025 COMPLETE CBC W/AUTO DIFF WBC: CPT

## 2022-08-13 PROCEDURE — 80053 COMPREHEN METABOLIC PANEL: CPT

## 2022-08-13 PROCEDURE — 84703 CHORIONIC GONADOTROPIN ASSAY: CPT

## 2022-08-13 PROCEDURE — 93010 ELECTROCARDIOGRAM REPORT: CPT | Performed by: INTERNAL MEDICINE

## 2022-08-13 PROCEDURE — 83605 ASSAY OF LACTIC ACID: CPT

## 2022-08-13 RX ORDER — SODIUM CHLORIDE, SODIUM LACTATE, POTASSIUM CHLORIDE, CALCIUM CHLORIDE 600; 310; 30; 20 MG/100ML; MG/100ML; MG/100ML; MG/100ML
1000 INJECTION, SOLUTION INTRAVENOUS ONCE
Status: COMPLETED | OUTPATIENT
Start: 2022-08-13 | End: 2022-08-13

## 2022-08-13 RX ORDER — ONDANSETRON 2 MG/ML
4 INJECTION INTRAMUSCULAR; INTRAVENOUS EVERY 6 HOURS PRN
Status: DISCONTINUED | OUTPATIENT
Start: 2022-08-13 | End: 2022-08-13 | Stop reason: HOSPADM

## 2022-08-13 RX ORDER — HYDROMORPHONE HYDROCHLORIDE 1 MG/ML
1 INJECTION, SOLUTION INTRAMUSCULAR; INTRAVENOUS; SUBCUTANEOUS
Status: COMPLETED | OUTPATIENT
Start: 2022-08-13 | End: 2022-08-13

## 2022-08-13 RX ORDER — OXYCODONE HYDROCHLORIDE AND ACETAMINOPHEN 5; 325 MG/1; MG/1
1 TABLET ORAL EVERY 6 HOURS PRN
Qty: 12 TABLET | Refills: 0 | Status: SHIPPED | OUTPATIENT
Start: 2022-08-13 | End: 2022-08-16

## 2022-08-13 RX ADMIN — SODIUM CHLORIDE, POTASSIUM CHLORIDE, SODIUM LACTATE AND CALCIUM CHLORIDE 1000 ML: 600; 310; 30; 20 INJECTION, SOLUTION INTRAVENOUS at 01:55

## 2022-08-13 RX ADMIN — ONDANSETRON 4 MG: 2 INJECTION INTRAMUSCULAR; INTRAVENOUS at 01:50

## 2022-08-13 RX ADMIN — HYDROMORPHONE HYDROCHLORIDE 1 MG: 1 INJECTION, SOLUTION INTRAMUSCULAR; INTRAVENOUS; SUBCUTANEOUS at 01:51

## 2022-08-13 ASSESSMENT — PAIN SCALES - GENERAL: PAINLEVEL_OUTOF10: 10

## 2022-08-13 ASSESSMENT — PAIN - FUNCTIONAL ASSESSMENT: PAIN_FUNCTIONAL_ASSESSMENT: 0-10

## 2022-08-13 ASSESSMENT — PAIN DESCRIPTION - ORIENTATION: ORIENTATION: LEFT

## 2022-08-13 ASSESSMENT — PAIN DESCRIPTION - LOCATION: LOCATION: ABDOMEN

## 2022-08-13 NOTE — ED PROVIDER NOTES
[address] [facility]  eMERGENCY dEPARTMENT eNCOUnter        Pt Name: Efrain Sanchez  MRN: [id number]  Margiegfzachary 1990  Date of evaluation: 8/13/2022  Provider: Chyna Wakefield MD  PCP: NOEMI Ruiz - CNP      CHIEF COMPLAINT       Chief Complaint   Patient presents with    Post-op Problem     Pt arrived via EMS from home, c/o abdominal pain after having gastric sleeve surgery on 8/3. Pt states she has been having pain since surgery but assumed it was normal, pain became more intense after she went to the restroom this evening, states pain is more on the left side. Denies any fever, chills, n/v.       HISTORY OFPRESENT ILLNESS   (Location/Symptom, Timing/Onset, Context/Setting, Quality, Duration, Modifying Factors,Severity)  Note limiting factors. Efrain Sanchez is a 28 y.o. female is 10 days post laparoscopic gastric sleeve with Dr. Vinnie Hawkins which she has been having pain daily but tonight at midnight it became severe in the left abdomen without radiation she is crying and states the pain is severe as sitting on the toilet and urinating up with the onset of the pain her bowel habits have been okay    Nursing Notes were all reviewed and agreed with or any disagreements were addressed  in the HPI. REVIEW OF SYSTEMS    (2-9 systems for level 4, 10 or more for level 5)       REVIEW OF SYSTEMS    Constitutional:  Denies fever, chills, or weakness   Eyes:  Denies vision changes  HENT:  Denies sore throat or neck pain   Respiratory:  Denies cough or shortness of breath   Cardiovascular:  Denies chest pain  GI:   abdominal pain, nausea,   no vomiting, or diarrhea   Musculoskeletal:  Denies back pain   Skin: no rash or vesicles   Neurologic:  no headache weakness focal    Lymphatic:  no swollen  nodes   Psychiatric: no si or hs thoughts     All systems negative except as marked. Positives and Pertinent negatives as per HPI.   Except as noted above in the ROS, all other systems were reviewed andnegative.        PASTMEDICAL HISTORY     Past Medical History:   Diagnosis Date    Abnormal heart rhythm     irregular heart rate    Abnormal Pap smear of cervix 2015    LEEP    Bipolar 1 disorder ([address]     H/o Molar pregnancy     Hypertension     Hypoglycemia     Uterus bicornis affecting pregnancy     Wears glasses          SURGICAL HISTORY       Past Surgical History:   Procedure Laterality Date    APPENDECTOMY  2015    CARPAL TUNNEL RELEASE Left 6/7/2022    LEFT CARPAL TUNNEL RELEASE performed by Mildred Montez MD at [facility] [address] Right 6/24/2022    RIGHT CARPAL TUNNEL RELEASE performed by Mildred Montez MD at [address] [place] [address]  2016    [address] [facility]  2011    LEEP      SLEEVE GASTRECTOMY N/A 8/3/2022    LAPAROSCOPIC SLEEVE GASTRECTOMY performed by Renato Mahan MD at [address]  11/29/2016    UPPER GASTROINTESTINAL ENDOSCOPY N/A 10/22/2021    EGD BIOPSY performed by Renato Mahan MD at [facility]       Previous Medications    FAMOTIDINE (PEPCID) 20 MG TABLET    Take 1 tablet by mouth daily as needed (Heartburn/Reflux)    MEDROXYPROGESTERONE ACETATE (DEPO-PROVERA IM)    Inject into the muscle every 3 months    METOPROLOL TARTRATE (LOPRESSOR) 25 MG TABLET    Take 25 mg by mouth 2 times daily    ONDANSETRON (ZOFRAN ODT) 4 MG DISINTEGRATING TABLET    Take 2 tablets by mouth every 8 hours as needed for Nausea    ONDANSETRON (ZOFRAN ODT) 4 MG DISINTEGRATING TABLET    Take 2 tablets by mouth every 8 hours as needed for Nausea       ALLERGIES     Latex and Strawberry extract    FAMILY HISTORY       Family History   Problem Relation Age of Onset    Diabetes Mother     No Known Problems Father           SOCIAL HISTORY       Social History     Socioeconomic History    Marital status: Single   Tobacco Use    Smoking status: Former     Packs/day: 0.50     Years: 15.00     Pack years: 7.50 Types: Cigarettes     Quit date: 10/24/2016     Years since quittin.8    Smokeless tobacco: Never   Vaping Use    Vaping Use: Never used   Substance and Sexual Activity    Alcohol use: No    Drug use: Not Currently     Types: Marijuana Jaime Leggett)     Comment: Prior hx of opiate use-LAST USED MARIJUANA-    Sexual activity: Yes     Partners: Male   Social History Narrative    ** Merged History Encounter **         ** Merged History Encounter **            SCREENINGS    [place] Coma Scale  Eye Opening: Spontaneous  Best Verbal Response: Oriented  Best Motor Response: Obeys commands  [place] Coma Scale Score: 15        PHYSICAL EXAM    (up to 7 for level 4, 8 or more for level 5)     ED Triage Vitals [22 0038]   BP Temp Temp Source Heart Rate Resp SpO2 Height Weight   (!) 146/96 98.3 °F (36.8 °C) Oral 75 20 99 % 5' 1\" (1.549 m) 256 lb (116.1 kg)           General Appearance:  Alert, cooperative, no distress, appears stated age. Head:  Normocephalic, without obvious abnormality, atraumatic. Eyes:  conjunctiva/corneas clear, EOM's intact. Sclera anicteric. ENT: Mucous membranes moist.   Neck: Supple, symmetrical, trachea midline, no adenopathy. No jugular venous distention. Lungs:   No Respiratory Distress. no rales  rhonchi rub   Chest Wall:  Nontender  no deformity   Heart:  Rsr no murmer gallop    Abdomen:   Soft  Laparoscopic ports with some tenderness to the left epigastrium to palpation no organomegally    Extremities:  Full range of motion. no deformity   Pulses: Equal  upper and lower    Skin:  No rashes or lesions to exposed skin. Neurologic: Alert and oriented X 3. Motor grossly normal.  Speech clear.  Cr n 2-12 intact       DIAGNOSTIC RESULTS   LABS:    Labs Reviewed   COMPREHENSIVE METABOLIC PANEL - Abnormal; Notable for the following components:       Result Value    ALT 72 (*)     AST 48 (*)     All other components within normal limits   URINALYSIS - Abnormal; Notable for the following components:    Color, UA RED (*)     Clarity, UA TURBID (*)     Bilirubin Urine SMALL (*)     Ketones, Urine TRACE (*)     Blood, Urine LARGE (*)     Nitrite, Urine POSITIVE (*)     Leukocyte Esterase, Urine MODERATE (*)     All other components within normal limits   MICROSCOPIC URINALYSIS - Abnormal; Notable for the following components:    RBC, UA  (*)     Epithelial Cells, UA 6-10 (*)     Bacteria, UA Rare (*)     All other components within normal limits   CBC WITH AUTO DIFFERENTIAL   LIPASE   LACTIC ACID   HCG, SERUM, QUALITATIVE       All other labs were within normal range or not returned as of thisdictation. EKG Interpretation    Interpreted by emergency department physician    Rhythm: sinus bradycardia  Rate: 50-60  Axis: normal  Ectopy: none  Conduction: normal  ST Segments: no acute change  T Waves: no acute change  Q Waves: none    Clinical Impression: Sinus bradycardi    Catherine Del Toro MD    EKG: All EKG's are interpreted by the Emergency Department Physician who either signs or Co-signs this chart in the absence of a cardiologist.        RADIOLOGY:   Non-plain film images such as CT, Ultrasound and MRI are read by the radiologist. Richardean Chimera images are visualized and preliminarily interpreted by the  ED Provider with the belowfindings:        Interpretation per the Radiologist below, if available at the time of this note:    CT ABDOMEN PELVIS WO CONTRAST Additional Contrast? None   Final Result   1. Sleeve gastrectomy has been performed. There is no perforation or   abscess along the margin of the stomach. Small amount of gas in the distal   esophagus but not dilated. 2.  Postsurgical changes in the right lower quadrant suggesting prior   appendectomy. There is no bowel obstruction or pneumoperitoneum. There is   no sign of colonic diverticulitis.                PROCEDURES   Unless otherwise noted below, none     Procedures    CRITICAL CARE TIME N/A      CONSULTS:  None    EMERGENCY DEPARTMENT COURSE and DIFFERENTIAL DIAGNOSIS/MDM:   Vitals:    Vitals:    08/13/22 0345 08/13/22 0415 08/13/22 0430 08/13/22 0445   BP: 136/86 122/78 131/80 114/86   Pulse: 57 75 65 83   Resp: 16 16 20 27   Temp:       TempSrc:       SpO2: 99% 98% 97% 98%   Weight:       Height:           Patient was given the following medications:  Medications   ondansetron (ZOFRAN) injection 4 mg (4 mg IntraVENous Given 8/13/22 0150)   lactated ringers infusion 1,000 mL (0 mLs IntraVENous Stopped 8/13/22 0451)   HYDROmorphone HCl PF (DILAUDID) injection 1 mg (1 mg IntraVENous Given 8/13/22 0151)     And CT are unremarkable patient is feeling better after medication she will be discharged with a limited supply of Percocet her pain is much improved her abdomen has some tenderness to the left upper quadrant but no guarding or rebound      Is this patient to be included in the SEP-1 Core Measure due to severe sepsis or septic shock? No   Exclusion criteria - the patient is NOT to be included for SEP-1 Core Measure due to: Infection is not suspected      The patient tolerated their visit well. Thepatient and / or the family were informed of the results of any tests, a time was given to answer questions. FINAL IMPRESSION      1. Left upper quadrant abdominal pain        DISPOSITION/PLAN   DISPOSITION Decision To Discharge 08/13/2022 04:49:25 AM      PATIENT REFERRED TO:  Floridalma Connor, APRN - CNP  [id number] [address] [place], [place] [address]bunny Jeronimo MD  [address] [place] [address]          DISCHARGE MEDICATIONS:  New Prescriptions    OXYCODONE-ACETAMINOPHEN (PERCOCET) 5-325 MG PER TABLET    Take 1 tablet by mouth every 6 hours as needed for Pain for up to 3 days. Intended supply: 3 days.  Take lowest dose possible to manage pain       DISCONTINUED MEDICATIONS:  Discontinued Medications    No medications on file              (Please note that portions of this note were completed with a voice recognition program.  Efforts were made to edit the dictations but occasionally words aremis-transcribed.)    Bharati Montana MD (electronically signed)           Bharati Montana MD  08/13/22 5313

## 2022-08-19 ENCOUNTER — OFFICE VISIT (OUTPATIENT)
Dept: BARIATRICS/WEIGHT MGMT | Age: 32
End: 2022-08-19

## 2022-08-19 VITALS
SYSTOLIC BLOOD PRESSURE: 120 MMHG | BODY MASS INDEX: 45.31 KG/M2 | HEIGHT: 61 IN | WEIGHT: 240 LBS | DIASTOLIC BLOOD PRESSURE: 83 MMHG

## 2022-08-19 DIAGNOSIS — Z98.84 S/P LAPAROSCOPIC SLEEVE GASTRECTOMY: Primary | ICD-10-CM

## 2022-08-19 PROCEDURE — 99024 POSTOP FOLLOW-UP VISIT: CPT | Performed by: SURGERY

## 2022-08-19 RX ORDER — CELECOXIB 200 MG/1
200 CAPSULE ORAL DAILY
Qty: 7 CAPSULE | Refills: 0 | Status: SHIPPED | OUTPATIENT
Start: 2022-08-19 | End: 2022-09-01

## 2022-08-19 RX ORDER — LIDOCAINE 50 MG/G
1 PATCH TOPICAL DAILY
Qty: 5 PATCH | Refills: 0 | Status: SHIPPED | OUTPATIENT
Start: 2022-08-19 | End: 2022-08-24

## 2022-08-19 NOTE — PROGRESS NOTES
[facility]) Physicians   [employer]  Belkis Candelaria MD, [facility] [address], [address] [address], [address] [place], [place] [address] [address] . Phone: [phone]  Fax: [phone]       The patient is a 28 y.o. female who returns today for follow up.    Israel Tay is s/p     Laparoscopic sleeve gastrectomy    We discussed how her weight affects her overall health including:  Patient Active Problem List   Diagnosis    History of gestational diabetes mellitus (GDM) in prior pregnancy, currently pregnant in second trimester    Obesity in pregnancy    Screening, , for malformation by ultrasound    Morbid obesity with BMI of 45.0-49.9, adult ([facility] [address]    Essential hypertension    Chronic GERD    Vitamin D deficiency    Carpal tunnel syndrome, bilateral    Carpal tunnel syndrome    S/P laparoscopic sleeve gastrectomy        Vitals:    22 0906   BP: 120/83   Site: Left Upper Arm   Weight: 240 lb (108.9 kg)   Height: 5' 1\" (1.549 m)       Lab Results   Component Value Date/Time    WBC 8.7 2022 01:49 AM    RBC 4.64 2022 01:49 AM    HGB 13.8 2022 01:49 AM    HCT 41.0 2022 01:49 AM    MCV 88.4 2022 01:49 AM    MCH 29.6 2022 01:49 AM    MCHC 33.5 2022 01:49 AM    MPV 10.1 2022 01:49 AM    NEUTOPHILPCT 54.1 2022 01:49 AM    LYMPHOPCT 31.0 2022 01:49 AM    MONOPCT 11.4 2022 01:49 AM    EOSRELPCT 2.9 2022 01:49 AM    BASOPCT 0.6 2022 01:49 AM    NEUTROABS 4.7 2022 01:49 AM    NEUTROABS 5.40 2016 02:59 PM    LYMPHSABS 2.7 2022 01:49 AM    MONOSABS 1.0 2022 01:49 AM    EOSABS 0.3 2022 01:49 AM     Lab Results   Component Value Date/Time     2022 01:49 AM    K 3.7 2022 01:49 AM    K 3.6 2021 12:08 PM     2022 01:49 AM    CO2 24 2022 01:49 AM    ANIONGAP 11 2022 01:49 AM    GLUCOSE 87 2022 01:49 AM    BUN 10 2022 01:49 AM    CREATININE 0.6 08/13/2022 01:49 AM    LABGLOM >60 08/13/2022 01:49 AM    GFRAA >60 08/13/2022 01:49 AM    CALCIUM 9.6 08/13/2022 01:49 AM    PROT 8.0 08/13/2022 01:49 AM    LABALBU 4.3 08/13/2022 01:49 AM    AGRATIO 1.2 08/13/2022 01:49 AM    BILITOT 0.5 08/13/2022 01:49 AM    ALKPHOS 98 08/13/2022 01:49 AM    ALT 72 08/13/2022 01:49 AM    AST 48 08/13/2022 01:49 AM    GLOB 3.5 08/04/2021 12:08 PM     Lab Results   Component Value Date/Time    CHOL 165 03/16/2022 12:54 PM    TRIG 80 03/16/2022 12:54 PM    HDL 44 03/16/2022 12:54 PM    LDLCALC 105 03/16/2022 12:54 PM    LABVLDL 16 03/16/2022 12:54 PM     Lab Results   Component Value Date/Time    TSHREFLEX 1.89 03/16/2022 12:54 PM     Lab Results   Component Value Date/Time    IRON 89 03/16/2022 12:54 PM    TIBC 354 03/16/2022 12:54 PM    LABIRON 25 03/16/2022 12:54 PM     Lab Results   Component Value Date/Time    BNYQEAAG22 573 03/16/2022 12:54 PM    FOLATE 7.52 03/16/2022 12:54 PM     Lab Results   Component Value Date/Time    VITD25 18.7 03/16/2022 12:54 PM     Lab Results   Component Value Date/Time    LABA1C 5.4 03/16/2022 12:54 PM    .3 03/16/2022 12:54 PM        The patient's current Body mass index is 45.35 kg/m². (8/19/22). Since her last visit she has lost 21 lbs since last visit and total of 22 lbs. Israel underwent dietary counseling, and I have reviewed and agree with the dietary counseling, and I have reviewed and agree with the diet plan. There are no changes in the patients medical history or physical exam.     Denies nausea, vomiting, fevers, chills, hiccups, shoulder pain, heartburn, dysphagia wound drainage/bulge nor change in color around incision. Bowels working ok and making urine. The incisions healing well. Overall I'm really pleased with Surem recovery. Pathology results were discussed with the patient. Patient still having incisional pain.   On exam today incisions are clean intact with the Dermabond on it no signs of erythema, induration, fluctuation, or any drainage. No signs of hernias. I reviewed the CT scan patient had 1 at the emergency room for her incisional pain and again was unremarkable. Reassured the patient that this is very normal especially that she had major surgery recently and expected to have incisional pain. Lidoderm patches as well as Celebrex were prescribed to the patient. Surem advised to sign  release form for utilizing the 3 months complimentary membership in the [address] [place] [address] starting after 6 weeks post op. I did explain thoroughly to the patient that compliance with  post op diet and other recommendations are integral part to improve the chances of successful weight loss and also not following it could end with serious health complications. I advised Surem to gradually advance activity and  to call if there are any questions or concerns. Surem will follow up in 4 weeks. Please note that some or all of this report was generated using voice recognition software. Please notify me in case of any questions about the content of this document, as some errors in transcription may have occurred .       Electronically signed by Isabel Payne MD , FACS, FASMBS  on 8/19/2022 at 9:38 AM

## 2022-08-19 NOTE — PROGRESS NOTES
Dietary Assessment Note      Vitals:   Vitals:    22 0906   Weight: 240 lb (108.9 kg)   Height: 5' 1\" (1.549 m)    Patient lost 20.6 lbs over 1 month. Total Weight Loss: 22 lbs    Labs reviewed: labs are reviewed, up to date and normal, labs reviewed, I note elevated ALT, AST    Protein intake: <60 grams/day     Fluid intake: 48-64 oz/day    Multivitamin/mineral intake: yes Fusion 4x/day      Calcium intake: no    Other: none    Exercise: yes walking around the house a few times/day    Nutrition Assessment: 2 week post-op sleeve visit. Pt reports that she is drinking 48 oz. Water throughout day, but struggling to get her protein shakes in. Only consuming 1 protein shake/day. Pt reports that fusion vitamins make her sick even when taking with food. Breakfast: not having anything till 10 am. Due to nausea  Snack: 10 am. 1/2 of shake (1 scoop per 8 oz. Liquid) Nectar or Unjury mixed with skim Lactaid milk  Lunch: 11:30 am. Water 10 oz. Snack: 12:40 pm.  2 oz. Mashed potatoes (instant ones)  Snack: 2:00 pm.  1/2 of container of jello  Snack: 5:00 pm.  1/2 of protein shake ( 1 scoop per 8 oz. Liquid)     Dinner: 7 pm.  3-4 oz. Of tomato soup ; pureed bananas   Snack:  none    Amount able to eat per sittin-3 oz. Following  rule: yes following    Food Intolerances/issues: none    Client Concerns: Severe pain on left side-burning sensation; hearburn, nausea     Goals:   Continue 48-64 oz.  Fluid/daily  Drink 2 protein shakes daily  Decrease pureed foods to get 2 protein shakes in to meet protein goals  Advance to phase 3 diet at 3 weeks post op if meeting protein goals   Sample Fusion capsule with calcium chews    Stop bananas       Plan: F/U at 6 weeks or per provider    Dara Gonzalez RD, LD

## 2022-09-01 ASSESSMENT — ENCOUNTER SYMPTOMS
EYES NEGATIVE: 1
ROS SKIN COMMENTS: ABDOMINAL SURGICAL INCISIONS.
ALLERGIC/IMMUNOLOGIC NEGATIVE: 1
GASTROINTESTINAL NEGATIVE: 1
RESPIRATORY NEGATIVE: 1

## 2022-09-01 NOTE — PATIENT INSTRUCTIONS
Diet tips to help make you successful postoperatively    Eating habits after surgery will need to be a long-term change. Eating habits are so ingrained that it can be difficult to change so having made these changes for surgery is a great accomplishment. It is important to maintain these new eating habits after surgery. Also, remember that overall health, age, and genetics make each person's weight loss progress different. Do not compare your progress, the amount you eat, or exercise to other patients. Protein first at every meal- Eat the protein portion of your meal first. Eating protein helps the body feel full and sends a signal to stop eating. Protein is very important in building tissue in the body. Eat at least 4 times per day- This includes protein supplements and small meals with a high amount of protein  Chewing your food thoroughly- Eating too quickly and improper chewing can cause pain and vomiting after surgery. Slowing down the speed at which you eat- Refill your fork only after you swallow. Adopt a new pattern of eating by taking a bite of food and putting your utensil down between bites. This will help to reduce the feeling of food being stuck.   Drink water and other fluids slowly- Drink at least 48 ounces per day minimum. Sip fluids as if they were hot beverages. If you find it difficult to stop gulping liquids, try using a sippy cup or a sport top water bottle. Make sure you are eating meals without drinking fluids- After surgery you will not be allowed to drink fluids 30 minutes before, during, or 30 minutes after your meal (30/30/30 rule). This will be a life-long behavior change. The reason for the rule is to keep food from passing through your smaller stomach more rapidly.  This will cause you to feel hungry again shortly after your meal.  Continue to avoid caffeine and carbonated beverages- Caffeine acts as a diuretic and can be dehydrating as well as irritating to the lining of the stomach. Carbonated beverages release gas and can expand the stomach. Continue to keep temptation from your kitchen- Keep your pantry and kitchen cabinets cleaned out of those dangerous foods that might tempt you after surgery (chips, cookies, candy, etc.). Continue to increase your exercise program- Increase your daily physical activity. Aim for 5-6 days per week for 30 minutes. Walking is an easy way to get started with exercising. You want exercise to be a regular part of your life after surgery. Make sure you have a good support system- There will be many changes and adjustments to make after surgery. It is important to have a supportive friend, family member or co-worker, etc. with whom you can talk. Continue to attend [facility]) Weight Management support groups as they can be helpful in maintaining behaviors. In addition, it is the responsibility of the patient to schedule and follow up on labs and tests completed after surgery. Results will be reviewed at each visit.

## 2022-09-01 NOTE — PROGRESS NOTES
[facility]) Physicians   [employer] [facility] [employer]    Subjective:      Patient ID: Ava Lane is a 28 y.o. female    HPI    6 weeks s/p sleeve gastrectomy    Israel Harry is a 28 y.o. obese female , Body mass index is 41.76 kg/m². Patient denies any nausea, vomiting, fevers, chills, shortness of breath, chest pain, constipation or urinary symptoms. Denies any heartburn nor dysphagia. Past Medical History:   Diagnosis Date    Abnormal heart rhythm     irregular heart rate    Abnormal Pap smear of cervix     LEEP    Bipolar 1 disorder ([facility] [address]     H/o Molar pregnancy     Hypertension     Hypoglycemia     Uterus bicornis affecting pregnancy     Wears glasses      Past Surgical History:   Procedure Laterality Date    APPENDECTOMY  2015    CARPAL TUNNEL RELEASE Left 2022    LEFT CARPAL TUNNEL RELEASE [facility] [address] Rudy Philippe MD [address] Right 2022    RIGHT CARPAL TUNNEL RELEASE performed by Rudy Philippe MD at [address] [facility]  2016    DILATION AND CURETTAGE OF UTERUS      LEEP      SLEEVE GASTRECTOMY N/A 8/3/2022    LAPAROSCOPIC SLEEVE GASTRECTOMY performed by Deforest Riedel, MD at [place] [address]  2016    UPPER GASTROINTESTINAL ENDOSCOPY N/A 10/22/2021    EGD BIOPSY performed by Deforest Riedel, MD at [address] [place] [address]     Family History   Problem Relation Age of Onset    Diabetes Mother     No Known Problems Father      Social History     Tobacco Use    Smoking status: Former     Packs/day: 0.50     Years: 15.00     Pack years: 7.50     Types: Cigarettes     Quit date: 10/24/2016     Years since quittin.9    Smokeless tobacco: Never   Substance Use Topics    Alcohol use: No     I counseled the patient on the importance of not smoking and risks of ETOH.    Allergies   Allergen Reactions    Latex Rash    Strawberry Extract Hives     Vitals:    22 0906   BP: 112/78   Site: Left Upper Arm   Weight: 221 lb (100.2 kg) Height: 5' 1\" (1.549 m)     Body mass index is 41.76 kg/m².     Current Outpatient Medications:     sucralfate (CARAFATE) 1 GM tablet, Take 1 g by mouth 4 times daily (before meals and nightly), Disp: , Rfl:     ondansetron (ZOFRAN ODT) 4 MG disintegrating tablet, Take 2 tablets by mouth every 8 hours as needed for Nausea, Disp: 30 tablet, Rfl: 2    famotidine (PEPCID) 20 MG tablet, Take 1 tablet by mouth daily as needed (Heartburn/Reflux), Disp: 30 tablet, Rfl: 0    medroxyPROGESTERone Acetate (DEPO-PROVERA IM), Inject into the muscle every 3 months, Disp: , Rfl:     metoprolol tartrate (LOPRESSOR) 25 MG tablet, Take 25 mg by mouth 2 times daily, Disp: , Rfl:     Lab Results   Component Value Date/Time    WBC 8.7 08/13/2022 01:49 AM    RBC 4.64 08/13/2022 01:49 AM    HGB 13.8 08/13/2022 01:49 AM    HCT 41.0 08/13/2022 01:49 AM    MCV 88.4 08/13/2022 01:49 AM    MCH 29.6 08/13/2022 01:49 AM    MCHC 33.5 08/13/2022 01:49 AM    MPV 10.1 08/13/2022 01:49 AM    NEUTOPHILPCT 54.1 08/13/2022 01:49 AM    LYMPHOPCT 31.0 08/13/2022 01:49 AM    MONOPCT 11.4 08/13/2022 01:49 AM    EOSRELPCT 2.9 08/13/2022 01:49 AM    BASOPCT 0.6 08/13/2022 01:49 AM    NEUTROABS 4.7 08/13/2022 01:49 AM    NEUTROABS 5.40 11/16/2016 02:59 PM    LYMPHSABS 2.7 08/13/2022 01:49 AM    MONOSABS 1.0 08/13/2022 01:49 AM    EOSABS 0.3 08/13/2022 01:49 AM     Lab Results   Component Value Date/Time     08/13/2022 01:49 AM    K 3.7 08/13/2022 01:49 AM    K 3.6 08/04/2021 12:08 PM     08/13/2022 01:49 AM    CO2 24 08/13/2022 01:49 AM    ANIONGAP 11 08/13/2022 01:49 AM    GLUCOSE 87 08/13/2022 01:49 AM    BUN 10 08/13/2022 01:49 AM    CREATININE 0.6 08/13/2022 01:49 AM    LABGLOM >60 08/13/2022 01:49 AM    GFRAA >60 08/13/2022 01:49 AM    CALCIUM 9.6 08/13/2022 01:49 AM    PROT 8.0 08/13/2022 01:49 AM    LABALBU 4.3 08/13/2022 01:49 AM    AGRATIO 1.2 08/13/2022 01:49 AM    BILITOT 0.5 08/13/2022 01:49 AM    ALKPHOS 98 08/13/2022 01:49 AM    ALT 72 08/13/2022 01:49 AM    AST 48 08/13/2022 01:49 AM    GLOB 3.5 08/04/2021 12:08 PM     Lab Results   Component Value Date/Time    CHOL 165 03/16/2022 12:54 PM    TRIG 80 03/16/2022 12:54 PM    HDL 44 03/16/2022 12:54 PM    LDLCALC 105 03/16/2022 12:54 PM    LABVLDL 16 03/16/2022 12:54 PM     Lab Results   Component Value Date/Time    TSHREFLEX 1.89 03/16/2022 12:54 PM     Lab Results   Component Value Date/Time    IRON 89 03/16/2022 12:54 PM    TIBC 354 03/16/2022 12:54 PM    LABIRON 25 03/16/2022 12:54 PM     Lab Results   Component Value Date/Time    MZOKFIOC08 573 03/16/2022 12:54 PM    FOLATE 7.52 03/16/2022 12:54 PM     Lab Results   Component Value Date/Time    VITD25 18.7 03/16/2022 12:54 PM     Lab Results   Component Value Date/Time    LABA1C 5.4 03/16/2022 12:54 PM    .3 03/16/2022 12:54 PM       Review of Systems   HENT: Negative. Eyes: Negative. Respiratory: Negative. Cardiovascular: Negative. Gastrointestinal: Negative. Endocrine: Negative. Genitourinary: Negative. Musculoskeletal: Negative. Skin: Negative. Abdominal surgical incisions. Allergic/Immunologic: Negative. Neurological: Negative. Hematological: Negative. Psychiatric/Behavioral: Negative. Objective:   Physical Exam  Vitals reviewed. Constitutional:       Appearance: Normal appearance. She is well-developed. She is obese. HENT:      Head: Normocephalic and atraumatic. Eyes:      Conjunctiva/sclera: Conjunctivae normal.      Pupils: Pupils are equal, round, and reactive to light. Cardiovascular:      Rate and Rhythm: Normal rate. Pulmonary:      Effort: Pulmonary effort is normal.   Abdominal:      Palpations: Abdomen is soft. Musculoskeletal:         General: Normal range of motion. Cervical back: Normal range of motion and neck supple. Skin:     General: Skin is warm and dry. Comments: Surgical incisions well healed.    Neurological:      Mental Status: She is alert and oriented to person, place, and time. Psychiatric:         Mood and Affect: Mood normal.         Behavior: Behavior normal.         Thought Content: Thought content normal.         Judgment: Judgment normal.     Assessment and Plan:   Patient is 6 weeks s/p sleeve, down 19 lbs with a total weight loss of 41 lbs. The patient's current Body mass index is 41.76 kg/m². (9/19/22). She is doing well,denies n/v/dysphagia or reflux. She is tolerating diet, getting adequate fluids and protein. Discussed with patient significantly reducing her fluids from 96 oz to 64 oz as this should allow for better toleration of eating. Explained that she should not have been above 64 oz at this point. Patient verbalized understanding. Bowels and bladder functioning. She is taking vitamins as instructed. She did meet with the registered dietitian for continued follow up. Discussed with dietitian and I agree with recommendations and plan. She is exercising with walking. Encouraged her to continue physical activity and the importance of exercise and weight loss. Incisions healing well. No lifting restrictions so will sign patient up for [facility] today at [place]. We will see her back in 10 weeks for continued follow up.

## 2022-09-19 ENCOUNTER — OFFICE VISIT (OUTPATIENT)
Dept: BARIATRICS/WEIGHT MGMT | Age: 32
End: 2022-09-19

## 2022-09-19 VITALS
HEIGHT: 61 IN | SYSTOLIC BLOOD PRESSURE: 112 MMHG | WEIGHT: 221 LBS | DIASTOLIC BLOOD PRESSURE: 78 MMHG | BODY MASS INDEX: 41.72 KG/M2

## 2022-09-19 DIAGNOSIS — K21.9 CHRONIC GERD: ICD-10-CM

## 2022-09-19 DIAGNOSIS — Z98.84 S/P LAPAROSCOPIC SLEEVE GASTRECTOMY: ICD-10-CM

## 2022-09-19 DIAGNOSIS — I10 ESSENTIAL HYPERTENSION: Primary | ICD-10-CM

## 2022-09-19 DIAGNOSIS — E66.01 MORBID OBESITY WITH BMI OF 40.0-44.9, ADULT (HCC): ICD-10-CM

## 2022-09-19 PROCEDURE — 99024 POSTOP FOLLOW-UP VISIT: CPT | Performed by: NURSE PRACTITIONER

## 2022-09-19 RX ORDER — SUCRALFATE 1 G/1
1 TABLET ORAL
COMMUNITY
Start: 2022-09-04

## 2022-09-19 NOTE — PROGRESS NOTES
Dietary Assessment Note      Vitals:   Vitals:    22 0906   BP: 112/78   Site: Left Upper Arm   Weight: 221 lb (100.2 kg)   Height: 5' 1\" (1.549 m)    Patient lost 19 lbs over 1 month. Total Weight Loss: 41 lbs    Labs reviewed:  no new lab studies available for review at time of visit    Protein intake: 60-80 grams/day     Fluid intake: >64 oz/day 6-8 bottles/day @ 16 oz. each  ( oz./day)    Multivitamin/mineral intake: yes Fusion 4x/day     Calcium intake: no    Other: none    Exercise:  walking 20 mins/day    Nutrition Assessment: 6 week post-op sleeve visit. Breakfast: 9:00 am. Avelina Silvano or unjury mixed with 8 oz. -1/2 of shake   Snack: 10:00-10:30 1 oz. Of low fat cottage cheese  Lunch: 1:00 pm.  2-3 oz. Tuna with light morin   Snack: Nectar or unjury whole shake-1 scoop with 8 oz. Water   Dinner: boiled chicken drumstick-only one side and 1 oz. Refried fat free beans    Snack: 1/2 of shake from earlier -waits 20 to 30 mins then has next snack  Snack: 1 oz. Low fat Mariano elias cheese      Amount able to eat per sittin-3 oz.      Following 3030 rule: yes     Food Intolerances/issues:  meats make pt feel sick, but still eating due to knowing that she needs the protein    Client Concerns: After eating has pain or vomits-feels like food gets stuck; acid reflux pain; less pain if she takes longer to eat; Burning sensation on inside of stomach close to incision     Goals:   Advance to phase 4 diet at 6 weeks  Decrease fluids to 64 oz/day  Chew foods thoroughly and take small bites      Plan: F/U at 4 months or per provider      Nya Rocha RD, LD

## 2023-02-03 ENCOUNTER — TELEPHONE (OUTPATIENT)
Dept: BARIATRICS/WEIGHT MGMT | Age: 33
End: 2023-02-03

## 2023-04-25 ENCOUNTER — HOSPITAL ENCOUNTER (OUTPATIENT)
Dept: ULTRASOUND IMAGING | Age: 33
Discharge: HOME | End: 2023-04-25
Payer: MEDICAID

## 2023-04-25 DIAGNOSIS — E04.9 ENLARGED THYROID: ICD-10-CM

## 2023-04-25 PROCEDURE — 76536 US EXAM OF HEAD AND NECK: CPT

## 2023-05-03 ENCOUNTER — HOSPITAL ENCOUNTER (OUTPATIENT)
Dept: GENERAL RADIOLOGY | Age: 33
Discharge: HOME | End: 2023-05-03
Payer: MEDICAID

## 2023-05-03 ENCOUNTER — HOSPITAL ENCOUNTER (OUTPATIENT)
Age: 33
Discharge: HOME | End: 2023-05-03
Payer: MEDICAID

## 2023-05-03 DIAGNOSIS — R52 PAIN: ICD-10-CM

## 2023-05-03 PROCEDURE — 73565 X-RAY EXAM OF KNEES: CPT

## 2023-05-03 PROCEDURE — 72100 X-RAY EXAM L-S SPINE 2/3 VWS: CPT

## 2023-05-25 ENCOUNTER — OFFICE VISIT (OUTPATIENT)
Dept: ORTHOPEDIC SURGERY | Age: 33
End: 2023-05-25
Payer: MEDICAID

## 2023-05-25 VITALS — WEIGHT: 220 LBS | BODY MASS INDEX: 41.54 KG/M2 | HEIGHT: 61 IN | RESPIRATION RATE: 16 BRPM

## 2023-05-25 DIAGNOSIS — M54.16 LUMBAR RADICULOPATHY: Primary | ICD-10-CM

## 2023-05-25 DIAGNOSIS — M51.36 DDD (DEGENERATIVE DISC DISEASE), LUMBAR: ICD-10-CM

## 2023-05-25 PROCEDURE — 1036F TOBACCO NON-USER: CPT | Performed by: STUDENT IN AN ORGANIZED HEALTH CARE EDUCATION/TRAINING PROGRAM

## 2023-05-25 PROCEDURE — 99204 OFFICE O/P NEW MOD 45 MIN: CPT | Performed by: STUDENT IN AN ORGANIZED HEALTH CARE EDUCATION/TRAINING PROGRAM

## 2023-05-25 PROCEDURE — G8427 DOCREV CUR MEDS BY ELIG CLIN: HCPCS | Performed by: STUDENT IN AN ORGANIZED HEALTH CARE EDUCATION/TRAINING PROGRAM

## 2023-05-25 PROCEDURE — G8417 CALC BMI ABV UP PARAM F/U: HCPCS | Performed by: STUDENT IN AN ORGANIZED HEALTH CARE EDUCATION/TRAINING PROGRAM

## 2023-05-25 RX ORDER — TIZANIDINE 4 MG/1
4 TABLET ORAL NIGHTLY
Qty: 30 TABLET | Refills: 0 | Status: SHIPPED | OUTPATIENT
Start: 2023-05-25 | End: 2023-06-24

## 2023-05-25 RX ORDER — METHYLPREDNISOLONE 4 MG/1
TABLET ORAL
Qty: 1 KIT | Refills: 0 | Status: SHIPPED | OUTPATIENT
Start: 2023-05-25 | End: 2023-05-31

## 2023-05-25 NOTE — PROGRESS NOTES
left lower extremity does not show any tenderness, deformity or injury. Range of motion is unremarkable. There is no gross instability. There are no rashes, ulcerations or lesions. Strength and tone are normal.    Diagnostic Testing:    AP and lateral lumbar spine films reviewed from 5/3/22: Mild dextrocurvature lower lumbar spine. No acute osseous abnormality. Moderate degenerative change most significant L5-S1 with loss disc height  endplate spondylosis. Mild facet arthropathy lower lumbar spine. Vertebral  body axial heights maintained. Impression:    Diagnosis Orders   1. Lumbar radiculopathy  [facility]      2. DDD (degenerative disc disease), lumbar  [facility]            Plan:    Above diagnoses are Worsening    1. Medications: I will add a trial of tizanidine to the current regimen. Counseled on risks, benefits and alternatives and recommended not to take the medicine and drive or operate heavy machinery. I will prescribe a medrol dose pack to help with the inflammatory component of pain. Risks, benefits and alternatives were discussed. Recommended to stop any NSAIDs to reduce risk of GI bleed during the course of the dose pack. 2. PT:  I will start the patient on a trial of PT to work on a lumbar stabilization program to focus on core strengthening, core stabilizing, lumbar stretches, hamstring flexibility, modalities as indicated for 6-8 visits over the next 4-6 weeks. 3. Further studies:  Can consider an MRI of the lumbar spine if no improvement with a trial of physical therapy. 4. Interventional:  After a trial of conservative treatments is completed, interventional options may be discussed.             Dean Yousif PA-C  Board Certified by the [employer] on Certification of [address] [employer] and [employer]

## 2023-06-14 ENCOUNTER — HOSPITAL ENCOUNTER (OUTPATIENT)
Dept: PHYSICAL THERAPY | Age: 33
Setting detail: THERAPIES SERIES
End: 2023-06-14
Payer: MEDICAID

## 2023-06-20 ENCOUNTER — HOSPITAL ENCOUNTER (OUTPATIENT)
Dept: PHYSICAL THERAPY | Age: 33
Setting detail: THERAPIES SERIES
Discharge: HOME | End: 2023-06-20
Payer: MEDICAID

## 2023-06-20 NOTE — PROGRESS NOTES
Physical [address] [facility],     Physical Therapy  Cancellation/No-show Note  Patient Name:  Adriana Rai  :  1990   Date:  2023  Cancelled visits to date: 0  No-shows to date: 2    Patient status for today's appointment patient:  []  Cancelled  []  Rescheduled appointment  [x]  No-show  (No show Eval),      Reason given by patient:  []  Patient ill  []  Conflicting appointment  []  No transportation    []  Conflict with work  []  No reason given  [x]  Other:     Comments:  no call no show    Phone call information:   []  Phone call made today to patient at _ time at number provided:      []  Patient answered, conversation as follows:    []  Patient did not answer, message left as follows:  [x]  Phone call not made today  []  Phone call not needed - pt contacted us to cancel and provided reason for cancellation.      Electronically signed by:  Dominik Laguna PT

## 2023-06-28 ENCOUNTER — APPOINTMENT (OUTPATIENT)
Dept: PHYSICAL THERAPY | Age: 33
End: 2023-06-28
Payer: MEDICAID

## 2023-06-30 ENCOUNTER — HOSPITAL ENCOUNTER (OUTPATIENT)
Dept: PHYSICAL THERAPY | Age: 33
Setting detail: THERAPIES SERIES
End: 2023-06-30
Payer: MEDICAID

## 2023-07-03 ENCOUNTER — HOSPITAL ENCOUNTER (OUTPATIENT)
Dept: PHYSICAL THERAPY | Age: 33
Setting detail: THERAPIES SERIES
Discharge: HOME | End: 2023-07-03

## 2023-07-03 NOTE — PROGRESS NOTES
[facility]     Physical Therapy  Cancellation/No-show Note  Patient Name:  Martha Alonso  :  1990   Date:  7/3/2023  Cancelled visits to date: 1  No-shows to date: 2    Patient status for today's appointment patient:  []  Cancelled   []  Rescheduled appointment  [x]  No-show  (No show Eval), , 7/3     Reason given by patient:  []  Patient ill  []  Conflicting appointment  []  No transportation    []  Conflict with work  []  No reason given  [x]  Other:     Comments:  no call no show    Phone call information:   [x]  Phone call made today to patient at 3:10 at number provided:      []  Patient answered, conversation as follows:    [x]  Patient did not answer, message left as follows: informed pt of cxl/no show policy. Informed of next scheduled visit and if pt doesn't attend, will assume d/c.  []  Phone call not made today  []  Phone call not needed - pt contacted us to cancel and provided reason for cancellation.      Electronically signed by:  Darell Jimenes, PT, DPT

## 2023-07-06 ENCOUNTER — HOSPITAL ENCOUNTER (OUTPATIENT)
Dept: PHYSICAL THERAPY | Age: 33
Setting detail: THERAPIES SERIES
Discharge: HOME | End: 2023-07-06

## 2023-11-12 ENCOUNTER — HOSPITAL ENCOUNTER (EMERGENCY)
Age: 33
Discharge: HOME | End: 2023-11-13
Payer: MEDICAID

## 2023-11-12 VITALS
TEMPERATURE: 98.3 F | OXYGEN SATURATION: 100 % | HEIGHT: 61 IN | HEART RATE: 87 BPM | DIASTOLIC BLOOD PRESSURE: 99 MMHG | RESPIRATION RATE: 16 BRPM | BODY MASS INDEX: 34.93 KG/M2 | SYSTOLIC BLOOD PRESSURE: 151 MMHG | WEIGHT: 185 LBS

## 2023-11-12 DIAGNOSIS — O46.90 VAGINAL BLEEDING IN PREGNANCY: Primary | ICD-10-CM

## 2023-11-12 LAB
ALBUMIN SERPL-MCNC: 4.5 G/DL (ref 3.4–5)
ALBUMIN/GLOB SERPL: 1.5 {RATIO} (ref 1.1–2.2)
ALP SERPL-CCNC: 88 U/L (ref 40–129)
ALT SERPL-CCNC: 10 U/L (ref 10–40)
ANION GAP SERPL CALCULATED.3IONS-SCNC: 13 MMOL/L (ref 3–16)
AST SERPL-CCNC: 18 U/L (ref 15–37)
B-HCG SERPL EIA 3RD IS-ACNC: NORMAL MIU/ML
BASOPHILS # BLD: 0.1 K/UL (ref 0–0.2)
BASOPHILS NFR BLD: 0.5 %
BILIRUB SERPL-MCNC: <0.2 MG/DL (ref 0–1)
BUN SERPL-MCNC: 9 MG/DL (ref 7–20)
CALCIUM SERPL-MCNC: 8.7 MG/DL (ref 8.3–10.6)
CHLORIDE SERPL-SCNC: 100 MMOL/L (ref 99–110)
CO2 SERPL-SCNC: 23 MMOL/L (ref 21–32)
CREAT SERPL-MCNC: <0.5 MG/DL (ref 0.6–1.1)
DEPRECATED RDW RBC AUTO: 13.1 % (ref 12.4–15.4)
EOSINOPHIL # BLD: 0.2 K/UL (ref 0–0.6)
EOSINOPHIL NFR BLD: 1.8 %
GFR SERPLBLD CREATININE-BSD FMLA CKD-EPI: >60 ML/MIN/{1.73_M2}
GLUCOSE SERPL-MCNC: 95 MG/DL (ref 70–99)
HCT VFR BLD AUTO: 38.7 % (ref 36–48)
HGB BLD-MCNC: 12.9 G/DL (ref 12–16)
LYMPHOCYTES # BLD: 3.2 K/UL (ref 1–5.1)
LYMPHOCYTES NFR BLD: 30.9 %
MCH RBC QN AUTO: 29.9 PG (ref 26–34)
MCHC RBC AUTO-ENTMCNC: 33.3 G/DL (ref 31–36)
MCV RBC AUTO: 89.8 FL (ref 80–100)
MONOCYTES # BLD: 0.8 K/UL (ref 0–1.3)
MONOCYTES NFR BLD: 8.1 %
NEUTROPHILS # BLD: 6 K/UL (ref 1.7–7.7)
NEUTROPHILS NFR BLD: 58.7 %
PLATELET # BLD AUTO: 276 K/UL (ref 135–450)
PMV BLD AUTO: 8.3 FL (ref 5–10.5)
POTASSIUM SERPL-SCNC: 3.5 MMOL/L (ref 3.5–5.1)
PROT SERPL-MCNC: 7.6 G/DL (ref 6.4–8.2)
RBC # BLD AUTO: 4.31 M/UL (ref 4–5.2)
SODIUM SERPL-SCNC: 136 MMOL/L (ref 136–145)
WBC # BLD AUTO: 10.3 K/UL (ref 4–11)

## 2023-11-12 PROCEDURE — 80053 COMPREHEN METABOLIC PANEL: CPT

## 2023-11-12 PROCEDURE — 85025 COMPLETE CBC W/AUTO DIFF WBC: CPT

## 2023-11-12 PROCEDURE — 81001 URINALYSIS AUTO W/SCOPE: CPT

## 2023-11-12 PROCEDURE — 84702 CHORIONIC GONADOTROPIN TEST: CPT

## 2023-11-12 PROCEDURE — 99284 EMERGENCY DEPT VISIT MOD MDM: CPT

## 2023-11-12 ASSESSMENT — ENCOUNTER SYMPTOMS
COUGH: 0
SHORTNESS OF BREATH: 0
VOMITING: 0
NAUSEA: 0
RHINORRHEA: 0
ABDOMINAL PAIN: 0
DIARRHEA: 0

## 2023-11-12 ASSESSMENT — PAIN DESCRIPTION - ORIENTATION: ORIENTATION: LEFT

## 2023-11-12 ASSESSMENT — PAIN DESCRIPTION - LOCATION: LOCATION: BACK

## 2023-11-12 ASSESSMENT — PAIN - FUNCTIONAL ASSESSMENT: PAIN_FUNCTIONAL_ASSESSMENT: 0-10

## 2023-11-12 ASSESSMENT — PAIN SCALES - GENERAL: PAINLEVEL_OUTOF10: 4

## 2023-11-13 ENCOUNTER — APPOINTMENT (OUTPATIENT)
Dept: ULTRASOUND IMAGING | Age: 33
End: 2023-11-13
Payer: MEDICAID

## 2023-11-13 LAB
BACTERIA URNS QL MICRO: ABNORMAL /HPF
BILIRUB UR QL STRIP.AUTO: NEGATIVE
CLARITY UR: CLEAR
COLOR UR: YELLOW
EPI CELLS #/AREA URNS AUTO: 6 /HPF (ref 0–5)
GLUCOSE UR STRIP.AUTO-MCNC: NEGATIVE MG/DL
HGB UR QL STRIP.AUTO: ABNORMAL
HYALINE CASTS #/AREA URNS AUTO: 2 /LPF (ref 0–8)
KETONES UR STRIP.AUTO-MCNC: NEGATIVE MG/DL
LEUKOCYTE ESTERASE UR QL STRIP.AUTO: ABNORMAL
NITRITE UR QL STRIP.AUTO: NEGATIVE
PH UR STRIP.AUTO: 7 [PH] (ref 5–8)
PROT UR STRIP.AUTO-MCNC: NEGATIVE MG/DL
RBC CLUMPS #/AREA URNS AUTO: 6 /HPF (ref 0–4)
SP GR UR STRIP.AUTO: 1.01 (ref 1–1.03)
UA COMPLETE W REFLEX CULTURE PNL UR: ABNORMAL
UA DIPSTICK W REFLEX MICRO PNL UR: YES
URN SPEC COLLECT METH UR: ABNORMAL
UROBILINOGEN UR STRIP-ACNC: 1 E.U./DL
WBC #/AREA URNS AUTO: 2 /HPF (ref 0–5)

## 2023-11-13 PROCEDURE — 76817 TRANSVAGINAL US OBSTETRIC: CPT

## 2023-11-13 NOTE — ED PROVIDER NOTES
patient, pelvic rest discussed. She has routine follow-up with her OB within the next 2 to 3 days. She is to return to the ED for any new or worsening symptoms, the patient voiced understanding is agreeable with plan, stable for discharge .    Results for orders placed or performed during the hospital encounter of 11/12/23   CBC with Auto Differential   Result Value Ref Range    WBC 10.3 4.0 - 11.0 K/uL    RBC 4.31 4.00 - 5.20 M/uL    Hemoglobin 12.9 12.0 - 16.0 g/dL    Hematocrit 38.7 36.0 - 48.0 %    MCV 89.8 80.0 - 100.0 fL    MCH 29.9 26.0 - 34.0 pg    MCHC 33.3 31.0 - 36.0 g/dL    RDW 13.1 12.4 - 15.4 %    Platelets 414 239 - 167 K/uL    MPV 8.3 5.0 - 10.5 fL    Neutrophils % 58.7 %    Lymphocytes % 30.9 %    Monocytes % 8.1 %    Eosinophils % 1.8 %    Basophils % 0.5 %    Neutrophils Absolute 6.0 1.7 - 7.7 K/uL    Lymphocytes Absolute 3.2 1.0 - 5.1 K/uL    Monocytes Absolute 0.8 0.0 - 1.3 K/uL    Eosinophils Absolute 0.2 0.0 - 0.6 K/uL    Basophils Absolute 0.1 0.0 - 0.2 K/uL   HCG, Quantitative, Pregnancy   Result Value Ref Range    hCG Quant 80838.0 <5.0 mIU/mL   Comprehensive Metabolic Panel   Result Value Ref Range    Sodium 136 136 - 145 mmol/L    Potassium 3.5 3.5 - 5.1 mmol/L    Chloride 100 99 - 110 mmol/L    CO2 23 21 - 32 mmol/L    Anion Gap 13 3 - 16    Glucose 95 70 - 99 mg/dL    BUN 9 7 - 20 mg/dL    Creatinine <0.5 (L) 0.6 - 1.1 mg/dL    Est, Glom Filt Rate >60 >60    Calcium 8.7 8.3 - 10.6 mg/dL    Total Protein 7.6 6.4 - 8.2 g/dL    Albumin 4.5 3.4 - 5.0 g/dL    Albumin/Globulin Ratio 1.5 1.1 - 2.2    Total Bilirubin <0.2 0.0 - 1.0 mg/dL    Alkaline Phosphatase 88 40 - 129 U/L    ALT 10 10 - 40 U/L    AST 18 15 - 37 U/L   Urinalysis with Reflex to Culture    Specimen: Urine   Result Value Ref Range    Color, UA Yellow Straw/Yellow    Clarity, UA Clear Clear    Glucose, Ur Negative Negative mg/dL    Bilirubin Urine Negative Negative    Ketones, Urine Negative Negative mg/dL    Specific Gravity,

## 2025-08-27 ENCOUNTER — HOSPITAL ENCOUNTER (OUTPATIENT)
Age: 35
Discharge: HOME | End: 2025-08-27
Payer: MEDICAID

## 2025-08-27 ENCOUNTER — HOSPITAL ENCOUNTER (OUTPATIENT)
Dept: GENERAL RADIOLOGY | Age: 35
Discharge: HOME | End: 2025-08-27
Payer: MEDICAID

## 2025-08-27 DIAGNOSIS — M54.42 ACUTE BACK PAIN WITH SCIATICA, LEFT: ICD-10-CM

## 2025-08-27 PROCEDURE — 72100 X-RAY EXAM L-S SPINE 2/3 VWS: CPT

## (undated) DEVICE — TROCAR: Brand: KII OPTICAL ACCESS SYSTEM

## (undated) DEVICE — RELOAD STPL 3.5MM L60MM 0DEG UNIV TISS PUR TI 6 ROW LIN

## (undated) DEVICE — DRAPE,LAP,CHOLE,W/TROUGHS,STERILE: Brand: MEDLINE

## (undated) DEVICE — LOTION PREP REMV 5OZ IODO CLR TINC OF BENZ DURAPREP

## (undated) DEVICE — DEVICE SUT SHFT L34CM DIA 10MM 2 JAW LD UNIT ENDOSTCH

## (undated) DEVICE — WILLIS PACK: Brand: MEDLINE INDUSTRIES, INC.

## (undated) DEVICE — BANDAGE COMPR W2INXL5YD TAN BRTH SELF ADH WRP W/ HND TEAR

## (undated) DEVICE — MERCY FAIRFIELD TURNOVER KIT: Brand: MEDLINE INDUSTRIES, INC.

## (undated) DEVICE — SOLUTION IV IRRIG 250ML ST LF 0.9% SODIUM 2F7122

## (undated) DEVICE — PROCEDURE KIT ENDOSCP CUST

## (undated) DEVICE — SHEET,DRAPE,40X58,STERILE: Brand: MEDLINE

## (undated) DEVICE — BW-412T DISP COMBO CLEANING BRUSH: Brand: SINGLE USE COMBINATION CLEANING BRUSH

## (undated) DEVICE — AIR SHEET,LAT,COMFORT GLIDE, BLEND 40X80: Brand: MEDLINE

## (undated) DEVICE — RELOAD STPL 2.5MM L60MM 0DEG VASC TISS TAN TI 6 ROW LIN

## (undated) DEVICE — 30977 SEE SHARP - ENHANCED INTRAOPERATIVE LAPAROSCOPE CLEANING & DEFOGGING: Brand: 30977 SEE SHARP - ENHANCED INTRAOPERATIVE LAPAROSCOPE CLEANING & DEFOGGING

## (undated) DEVICE — SOLUTION IRRIG 1000ML 0.9% SOD CHL USP POUR PLAS BTL

## (undated) DEVICE — PASSIVE LAPSCP FLTR W/ 1/4INX24 TBNG AND M LUER LCK FIT - U

## (undated) DEVICE — SHEET,DRAPE,53X77,STERILE: Brand: MEDLINE

## (undated) DEVICE — SPONGE,LAP,4"X18",XR,ST,5/PK,40PK/CS: Brand: MEDLINE INDUSTRIES, INC.

## (undated) DEVICE — CRADLE ANK AND FT ELEV FLAT END POLY FOAM W/ VENT H NEUT

## (undated) DEVICE — NEEDLE INSUF L150MM DIA2MM DISP FOR PNEUMOPERI ENDOPATH

## (undated) DEVICE — SHELL STPL PWR FOR SIGNIA HNDL STPL SYS

## (undated) DEVICE — SYRINGE MED 10ML TRNSLUC BRL PLUNG BLK MRK POLYPR CTRL

## (undated) DEVICE — BLANKET WRM W29.9XL79.1IN UP BODY FORC AIR MISTRAL-AIR

## (undated) DEVICE — APPLICATOR PREP 26ML 0.7% IOD POVACRYLEX 74% ISO ALC ST

## (undated) DEVICE — WRAP COHESIVE W2INXL5YD TAN SELF ADH BNDG HND NON STERILE TEAR CARING

## (undated) DEVICE — DEVICE SUT W/ SZ 0 L48IN VLT POLYSRB SUT DISP ES-9 ENDO

## (undated) DEVICE — STERILE POLYISOPRENE POWDER-FREE SURGICAL GLOVES: Brand: PROTEXIS

## (undated) DEVICE — NEEDLE HYPO 22GA L1.5IN BLK POLYPR HUB S STL REG BVL STR

## (undated) DEVICE — ADHESIVE SKIN CLSR 0.7ML TOP DERMBND ADV

## (undated) DEVICE — LAPAROSCOPIC SCISSORS: Brand: EPIX LAPAROSCOPIC SCISSORS

## (undated) DEVICE — SHEARS ENDOSCP HARM 36CM ULTRASONIC CRV TIP UPGRD

## (undated) DEVICE — TRUE CONTENT TO BE POPULATED AS PART OF REBRANDING: Brand: ARGYLE

## (undated) DEVICE — ARM CRADLE: Brand: DEVON

## (undated) DEVICE — LAPAROSCOPY PACK: Brand: MEDLINE INDUSTRIES, INC.

## (undated) DEVICE — MOUTHPIECE ENDOSCP L CTRL OPN AND SIDE PORTS DISP

## (undated) DEVICE — PMI DISPOSABLE PUNCTURE CLOSURE DEVICE / SUTURE GRASPER: Brand: PMI

## (undated) DEVICE — SUTURE VCRL PLUS SZ 0 54IN TIE VCP608H

## (undated) DEVICE — BOWL MED L 32OZ PLAS W/ MOLD GRAD EZ OPN PEEL PCH

## (undated) DEVICE — TROCARS: Brand: KII® OPTICAL ACCESS SYSTEM

## (undated) DEVICE — SOLUTION IV IRRIG WATER 500ML POUR BRL ST 2F7113

## (undated) DEVICE — GOWN,AURORA,NONREINF,RAGLAN,XXL,STERILE: Brand: MEDLINE

## (undated) DEVICE — TROCAR: Brand: KII FIOS FIRST ENTRY

## (undated) DEVICE — SUTURE VCRL + SZ 4-0 L18IN ABSRB UD L19MM PS-2 3/8 CIR PRIM VCP496H

## (undated) DEVICE — SET VLV 3 PC AWS DISPOSABLE GRDIAN SCOPEVALET

## (undated) DEVICE — FORCEPS BX L240CM WRK CHN 2.8MM STD CAP W/ NDL MIC MESH